# Patient Record
Sex: MALE | Race: WHITE | Employment: FULL TIME | URBAN - METROPOLITAN AREA
[De-identification: names, ages, dates, MRNs, and addresses within clinical notes are randomized per-mention and may not be internally consistent; named-entity substitution may affect disease eponyms.]

---

## 2017-01-19 ENCOUNTER — OFFICE VISIT (OUTPATIENT)
Dept: PAIN MANAGEMENT | Age: 52
End: 2017-01-19

## 2017-01-19 VITALS
SYSTOLIC BLOOD PRESSURE: 110 MMHG | HEART RATE: 76 BPM | BODY MASS INDEX: 26.54 KG/M2 | WEIGHT: 185 LBS | DIASTOLIC BLOOD PRESSURE: 64 MMHG

## 2017-01-19 DIAGNOSIS — M51.27 PROLAPSED LUMBOSACRAL INTERVERTEBRAL DISC: ICD-10-CM

## 2017-01-19 DIAGNOSIS — S33.5XXA LOW BACK SPRAIN, INITIAL ENCOUNTER: ICD-10-CM

## 2017-01-19 DIAGNOSIS — S33.5XXA SPRAIN OF LUMBAR REGION, INITIAL ENCOUNTER: ICD-10-CM

## 2017-01-19 PROCEDURE — 99214 OFFICE O/P EST MOD 30 MIN: CPT | Performed by: INTERNAL MEDICINE

## 2017-01-19 RX ORDER — AMITRIPTYLINE HYDROCHLORIDE 25 MG/1
25 TABLET, FILM COATED ORAL NIGHTLY PRN
Qty: 30 TABLET | Refills: 1 | Status: SHIPPED | OUTPATIENT
Start: 2017-01-19 | End: 2017-02-17 | Stop reason: SDUPTHER

## 2017-01-19 RX ORDER — MORPHINE SULFATE 30 MG/1
30 CAPSULE, EXTENDED RELEASE ORAL DAILY
Qty: 30 CAPSULE | Refills: 0 | Status: SHIPPED | OUTPATIENT
Start: 2017-01-19 | End: 2017-02-17 | Stop reason: SDUPTHER

## 2017-01-19 RX ORDER — MAGNESIUM OXIDE 400 MG/1
TABLET ORAL
Qty: 60 TABLET | Refills: 1 | Status: SHIPPED | OUTPATIENT
Start: 2017-01-19 | End: 2017-03-20 | Stop reason: SDUPTHER

## 2017-01-19 RX ORDER — HYDROCODONE BITARTRATE AND ACETAMINOPHEN 7.5; 325 MG/1; MG/1
1 TABLET ORAL EVERY 6 HOURS PRN
Qty: 90 TABLET | Refills: 0 | Status: SHIPPED | OUTPATIENT
Start: 2017-01-19 | End: 2017-02-17 | Stop reason: SDUPTHER

## 2017-01-19 RX ORDER — MELOXICAM 15 MG/1
15 TABLET ORAL DAILY PRN
Qty: 30 TABLET | Refills: 1 | Status: SHIPPED | OUTPATIENT
Start: 2017-01-19 | End: 2017-02-17 | Stop reason: SDUPTHER

## 2017-02-17 ENCOUNTER — OFFICE VISIT (OUTPATIENT)
Dept: PAIN MANAGEMENT | Age: 52
End: 2017-02-17

## 2017-02-17 VITALS
WEIGHT: 181 LBS | DIASTOLIC BLOOD PRESSURE: 74 MMHG | HEART RATE: 72 BPM | SYSTOLIC BLOOD PRESSURE: 126 MMHG | BODY MASS INDEX: 25.97 KG/M2

## 2017-02-17 DIAGNOSIS — M51.27 PROLAPSED LUMBOSACRAL INTERVERTEBRAL DISC: ICD-10-CM

## 2017-02-17 DIAGNOSIS — S33.5XXA LOW BACK SPRAIN, INITIAL ENCOUNTER: ICD-10-CM

## 2017-02-17 DIAGNOSIS — S33.5XXA SPRAIN OF LUMBAR REGION, INITIAL ENCOUNTER: ICD-10-CM

## 2017-02-17 PROCEDURE — 99214 OFFICE O/P EST MOD 30 MIN: CPT | Performed by: INTERNAL MEDICINE

## 2017-02-17 RX ORDER — MELOXICAM 15 MG/1
15 TABLET ORAL DAILY PRN
Qty: 30 TABLET | Refills: 1 | Status: SHIPPED | OUTPATIENT
Start: 2017-02-17 | End: 2017-03-20 | Stop reason: SDUPTHER

## 2017-02-17 RX ORDER — AMITRIPTYLINE HYDROCHLORIDE 25 MG/1
25 TABLET, FILM COATED ORAL NIGHTLY PRN
Qty: 30 TABLET | Refills: 1 | Status: SHIPPED | OUTPATIENT
Start: 2017-02-17 | End: 2017-03-20 | Stop reason: SDUPTHER

## 2017-02-17 RX ORDER — HYDROCODONE BITARTRATE AND ACETAMINOPHEN 7.5; 325 MG/1; MG/1
1 TABLET ORAL EVERY 6 HOURS PRN
Qty: 90 TABLET | Refills: 0 | Status: SHIPPED | OUTPATIENT
Start: 2017-02-17 | End: 2017-03-20 | Stop reason: SDUPTHER

## 2017-02-17 RX ORDER — MORPHINE SULFATE 30 MG/1
30 CAPSULE, EXTENDED RELEASE ORAL DAILY
Qty: 30 CAPSULE | Refills: 0 | Status: SHIPPED | OUTPATIENT
Start: 2017-02-17 | End: 2017-03-20 | Stop reason: SDUPTHER

## 2017-03-20 ENCOUNTER — OFFICE VISIT (OUTPATIENT)
Dept: PAIN MANAGEMENT | Age: 52
End: 2017-03-20

## 2017-03-20 VITALS
WEIGHT: 180 LBS | BODY MASS INDEX: 25.83 KG/M2 | SYSTOLIC BLOOD PRESSURE: 123 MMHG | HEART RATE: 73 BPM | DIASTOLIC BLOOD PRESSURE: 68 MMHG

## 2017-03-20 DIAGNOSIS — S33.5XXA SPRAIN OF LUMBAR REGION, INITIAL ENCOUNTER: ICD-10-CM

## 2017-03-20 DIAGNOSIS — S33.5XXA LOW BACK SPRAIN, INITIAL ENCOUNTER: ICD-10-CM

## 2017-03-20 DIAGNOSIS — M51.27 PROLAPSED LUMBOSACRAL INTERVERTEBRAL DISC: ICD-10-CM

## 2017-03-20 PROCEDURE — 99214 OFFICE O/P EST MOD 30 MIN: CPT | Performed by: INTERNAL MEDICINE

## 2017-03-20 RX ORDER — AMITRIPTYLINE HYDROCHLORIDE 25 MG/1
25 TABLET, FILM COATED ORAL NIGHTLY PRN
Qty: 30 TABLET | Refills: 0 | Status: SHIPPED | OUTPATIENT
Start: 2017-03-20 | End: 2017-04-18 | Stop reason: SDUPTHER

## 2017-03-20 RX ORDER — MELOXICAM 15 MG/1
15 TABLET ORAL DAILY PRN
Qty: 30 TABLET | Refills: 0 | Status: SHIPPED | OUTPATIENT
Start: 2017-03-20 | End: 2017-04-18 | Stop reason: SDUPTHER

## 2017-03-20 RX ORDER — MORPHINE SULFATE 30 MG/1
30 CAPSULE, EXTENDED RELEASE ORAL DAILY
Qty: 28 CAPSULE | Refills: 0 | Status: SHIPPED | OUTPATIENT
Start: 2017-03-20 | End: 2017-04-18 | Stop reason: SDUPTHER

## 2017-03-20 RX ORDER — HYDROCODONE BITARTRATE AND ACETAMINOPHEN 7.5; 325 MG/1; MG/1
1 TABLET ORAL EVERY 6 HOURS PRN
Qty: 84 TABLET | Refills: 0 | Status: SHIPPED | OUTPATIENT
Start: 2017-03-20 | End: 2017-04-18 | Stop reason: SDUPTHER

## 2017-03-20 RX ORDER — MAGNESIUM OXIDE 400 MG/1
TABLET ORAL
Qty: 60 TABLET | Refills: 0 | Status: SHIPPED | OUTPATIENT
Start: 2017-03-20 | End: 2017-04-18 | Stop reason: SDUPTHER

## 2017-04-18 ENCOUNTER — OFFICE VISIT (OUTPATIENT)
Dept: PAIN MANAGEMENT | Age: 52
End: 2017-04-18

## 2017-04-18 VITALS
WEIGHT: 191 LBS | HEART RATE: 63 BPM | SYSTOLIC BLOOD PRESSURE: 137 MMHG | BODY MASS INDEX: 27.41 KG/M2 | DIASTOLIC BLOOD PRESSURE: 73 MMHG

## 2017-04-18 DIAGNOSIS — S33.5XXA SPRAIN OF LUMBAR REGION, INITIAL ENCOUNTER: ICD-10-CM

## 2017-04-18 DIAGNOSIS — M51.27 PROLAPSED LUMBOSACRAL INTERVERTEBRAL DISC: ICD-10-CM

## 2017-04-18 DIAGNOSIS — S33.5XXA LOW BACK SPRAIN, INITIAL ENCOUNTER: ICD-10-CM

## 2017-04-18 PROCEDURE — 99213 OFFICE O/P EST LOW 20 MIN: CPT | Performed by: INTERNAL MEDICINE

## 2017-04-18 RX ORDER — MAGNESIUM OXIDE 400 MG/1
TABLET ORAL
Qty: 60 TABLET | Refills: 0 | Status: SHIPPED | OUTPATIENT
Start: 2017-04-18 | End: 2017-05-16 | Stop reason: SDUPTHER

## 2017-04-18 RX ORDER — HYDROCODONE BITARTRATE AND ACETAMINOPHEN 7.5; 325 MG/1; MG/1
1 TABLET ORAL EVERY 6 HOURS PRN
Qty: 84 TABLET | Refills: 0 | Status: SHIPPED | OUTPATIENT
Start: 2017-04-18 | End: 2017-05-16 | Stop reason: SDUPTHER

## 2017-04-18 RX ORDER — MELOXICAM 15 MG/1
15 TABLET ORAL DAILY PRN
Qty: 30 TABLET | Refills: 0 | Status: SHIPPED | OUTPATIENT
Start: 2017-04-18 | End: 2017-05-16 | Stop reason: SDUPTHER

## 2017-04-18 RX ORDER — MORPHINE SULFATE 30 MG/1
30 CAPSULE, EXTENDED RELEASE ORAL DAILY
Qty: 28 CAPSULE | Refills: 0 | Status: SHIPPED | OUTPATIENT
Start: 2017-04-18 | End: 2017-05-16 | Stop reason: SDUPTHER

## 2017-04-18 RX ORDER — AMITRIPTYLINE HYDROCHLORIDE 25 MG/1
25 TABLET, FILM COATED ORAL NIGHTLY PRN
Qty: 30 TABLET | Refills: 0 | Status: SHIPPED | OUTPATIENT
Start: 2017-04-18 | End: 2017-05-16 | Stop reason: SDUPTHER

## 2017-05-16 ENCOUNTER — OFFICE VISIT (OUTPATIENT)
Dept: PAIN MANAGEMENT | Age: 52
End: 2017-05-16

## 2017-05-16 VITALS
SYSTOLIC BLOOD PRESSURE: 129 MMHG | HEART RATE: 65 BPM | WEIGHT: 183 LBS | BODY MASS INDEX: 26.26 KG/M2 | DIASTOLIC BLOOD PRESSURE: 73 MMHG

## 2017-05-16 DIAGNOSIS — S33.5XXA LOW BACK SPRAIN, INITIAL ENCOUNTER: ICD-10-CM

## 2017-05-16 DIAGNOSIS — M51.27 PROLAPSED LUMBOSACRAL INTERVERTEBRAL DISC: ICD-10-CM

## 2017-05-16 DIAGNOSIS — S33.5XXA SPRAIN OF LUMBAR REGION, INITIAL ENCOUNTER: ICD-10-CM

## 2017-05-16 PROCEDURE — 99213 OFFICE O/P EST LOW 20 MIN: CPT | Performed by: INTERNAL MEDICINE

## 2017-05-16 RX ORDER — AMITRIPTYLINE HYDROCHLORIDE 25 MG/1
25 TABLET, FILM COATED ORAL NIGHTLY PRN
Qty: 30 TABLET | Refills: 0 | Status: SHIPPED | OUTPATIENT
Start: 2017-05-16 | End: 2017-06-19 | Stop reason: SDUPTHER

## 2017-05-16 RX ORDER — MAGNESIUM OXIDE 400 MG/1
TABLET ORAL
Qty: 60 TABLET | Refills: 0 | Status: SHIPPED | OUTPATIENT
Start: 2017-05-16 | End: 2017-06-19 | Stop reason: SDUPTHER

## 2017-05-16 RX ORDER — HYDROCODONE BITARTRATE AND ACETAMINOPHEN 7.5; 325 MG/1; MG/1
1 TABLET ORAL EVERY 6 HOURS PRN
Qty: 90 TABLET | Refills: 0 | Status: SHIPPED | OUTPATIENT
Start: 2017-05-16 | End: 2017-06-19 | Stop reason: SDUPTHER

## 2017-05-16 RX ORDER — MORPHINE SULFATE 30 MG/1
30 CAPSULE, EXTENDED RELEASE ORAL DAILY
Qty: 30 CAPSULE | Refills: 0 | Status: SHIPPED | OUTPATIENT
Start: 2017-05-16 | End: 2017-06-15

## 2017-05-16 RX ORDER — MELOXICAM 15 MG/1
15 TABLET ORAL DAILY PRN
Qty: 30 TABLET | Refills: 0 | Status: SHIPPED | OUTPATIENT
Start: 2017-05-16 | End: 2017-06-19 | Stop reason: SDUPTHER

## 2017-06-19 ENCOUNTER — OFFICE VISIT (OUTPATIENT)
Dept: PAIN MANAGEMENT | Age: 52
End: 2017-06-19

## 2017-06-19 VITALS
DIASTOLIC BLOOD PRESSURE: 73 MMHG | SYSTOLIC BLOOD PRESSURE: 122 MMHG | WEIGHT: 181 LBS | BODY MASS INDEX: 25.97 KG/M2 | HEART RATE: 64 BPM

## 2017-06-19 DIAGNOSIS — M51.27 PROLAPSED LUMBOSACRAL INTERVERTEBRAL DISC: ICD-10-CM

## 2017-06-19 DIAGNOSIS — S33.5XXA LOW BACK SPRAIN, INITIAL ENCOUNTER: ICD-10-CM

## 2017-06-19 DIAGNOSIS — S33.5XXA SPRAIN OF LUMBAR REGION, INITIAL ENCOUNTER: ICD-10-CM

## 2017-06-19 PROCEDURE — 99213 OFFICE O/P EST LOW 20 MIN: CPT | Performed by: INTERNAL MEDICINE

## 2017-06-19 RX ORDER — MELOXICAM 15 MG/1
15 TABLET ORAL DAILY PRN
Qty: 30 TABLET | Refills: 0 | Status: SHIPPED | OUTPATIENT
Start: 2017-06-19 | End: 2017-07-20 | Stop reason: SDUPTHER

## 2017-06-19 RX ORDER — HYDROCODONE BITARTRATE AND ACETAMINOPHEN 7.5; 325 MG/1; MG/1
1 TABLET ORAL EVERY 6 HOURS PRN
Qty: 90 TABLET | Refills: 0 | Status: SHIPPED | OUTPATIENT
Start: 2017-06-19 | End: 2017-07-20 | Stop reason: SDUPTHER

## 2017-06-19 RX ORDER — MAGNESIUM OXIDE 400 MG/1
TABLET ORAL
Qty: 60 TABLET | Refills: 0 | Status: SHIPPED | OUTPATIENT
Start: 2017-06-19 | End: 2017-07-20 | Stop reason: SDUPTHER

## 2017-06-19 RX ORDER — MORPHINE SULFATE 30 MG/1
30 CAPSULE, EXTENDED RELEASE ORAL DAILY
Qty: 30 CAPSULE | Refills: 0 | Status: SHIPPED | OUTPATIENT
Start: 2017-06-19 | End: 2017-07-19

## 2017-06-19 RX ORDER — AMITRIPTYLINE HYDROCHLORIDE 25 MG/1
25 TABLET, FILM COATED ORAL NIGHTLY PRN
Qty: 30 TABLET | Refills: 0 | Status: SHIPPED | OUTPATIENT
Start: 2017-06-19 | End: 2017-07-20 | Stop reason: SDUPTHER

## 2017-07-20 ENCOUNTER — OFFICE VISIT (OUTPATIENT)
Dept: PAIN MANAGEMENT | Age: 52
End: 2017-07-20

## 2017-07-20 VITALS
HEART RATE: 67 BPM | DIASTOLIC BLOOD PRESSURE: 75 MMHG | SYSTOLIC BLOOD PRESSURE: 130 MMHG | BODY MASS INDEX: 25.83 KG/M2 | WEIGHT: 180 LBS

## 2017-07-20 DIAGNOSIS — S33.5XXA SPRAIN OF LUMBAR REGION, INITIAL ENCOUNTER: ICD-10-CM

## 2017-07-20 DIAGNOSIS — S33.5XXA LOW BACK SPRAIN, INITIAL ENCOUNTER: ICD-10-CM

## 2017-07-20 DIAGNOSIS — M51.27 PROLAPSED LUMBOSACRAL INTERVERTEBRAL DISC: ICD-10-CM

## 2017-07-20 PROCEDURE — 99214 OFFICE O/P EST MOD 30 MIN: CPT | Performed by: INTERNAL MEDICINE

## 2017-07-20 RX ORDER — MORPHINE SULFATE 30 MG/1
30 CAPSULE, EXTENDED RELEASE ORAL DAILY
Qty: 30 CAPSULE | Refills: 0 | Status: SHIPPED | OUTPATIENT
Start: 2017-07-20 | End: 2017-08-19

## 2017-07-20 RX ORDER — AMITRIPTYLINE HYDROCHLORIDE 25 MG/1
25 TABLET, FILM COATED ORAL NIGHTLY PRN
Qty: 30 TABLET | Refills: 0 | Status: SHIPPED | OUTPATIENT
Start: 2017-07-20 | End: 2017-08-12 | Stop reason: SDUPTHER

## 2017-07-20 RX ORDER — HYDROCODONE BITARTRATE AND ACETAMINOPHEN 7.5; 325 MG/1; MG/1
1 TABLET ORAL EVERY 6 HOURS PRN
Qty: 90 TABLET | Refills: 0 | Status: SHIPPED | OUTPATIENT
Start: 2017-07-20 | End: 2017-08-12 | Stop reason: SDUPTHER

## 2017-07-20 RX ORDER — MELOXICAM 15 MG/1
15 TABLET ORAL DAILY PRN
Qty: 30 TABLET | Refills: 0 | Status: SHIPPED | OUTPATIENT
Start: 2017-07-20 | End: 2017-08-12 | Stop reason: SDUPTHER

## 2017-07-20 RX ORDER — METHYLPREDNISOLONE 4 MG/1
TABLET ORAL
Qty: 1 KIT | Refills: 0 | Status: SHIPPED | OUTPATIENT
Start: 2017-07-20 | End: 2017-08-12

## 2017-07-20 RX ORDER — MAGNESIUM OXIDE 400 MG/1
TABLET ORAL
Qty: 60 TABLET | Refills: 0 | Status: SHIPPED | OUTPATIENT
Start: 2017-07-20 | End: 2017-08-12 | Stop reason: SDUPTHER

## 2017-08-12 ENCOUNTER — OFFICE VISIT (OUTPATIENT)
Dept: PAIN MANAGEMENT | Age: 52
End: 2017-08-12

## 2017-08-12 VITALS
SYSTOLIC BLOOD PRESSURE: 138 MMHG | DIASTOLIC BLOOD PRESSURE: 77 MMHG | HEART RATE: 66 BPM | WEIGHT: 180 LBS | BODY MASS INDEX: 25.83 KG/M2

## 2017-08-12 DIAGNOSIS — M51.27 PROLAPSED LUMBOSACRAL INTERVERTEBRAL DISC: ICD-10-CM

## 2017-08-12 DIAGNOSIS — S33.5XXA LOW BACK SPRAIN, INITIAL ENCOUNTER: ICD-10-CM

## 2017-08-12 DIAGNOSIS — S33.5XXA SPRAIN OF LUMBAR REGION, INITIAL ENCOUNTER: ICD-10-CM

## 2017-08-12 PROCEDURE — 20553 NJX 1/MLT TRIGGER POINTS 3/>: CPT | Performed by: INTERNAL MEDICINE

## 2017-08-12 PROCEDURE — 99214 OFFICE O/P EST MOD 30 MIN: CPT | Performed by: INTERNAL MEDICINE

## 2017-08-12 RX ORDER — TRIAMCINOLONE ACETONIDE 40 MG/ML
40 INJECTION, SUSPENSION INTRA-ARTICULAR; INTRAMUSCULAR ONCE
Status: COMPLETED | OUTPATIENT
Start: 2017-08-12 | End: 2017-08-12

## 2017-08-12 RX ORDER — MELOXICAM 15 MG/1
15 TABLET ORAL DAILY PRN
Qty: 30 TABLET | Refills: 1 | Status: SHIPPED | OUTPATIENT
Start: 2017-08-12 | End: 2017-09-19 | Stop reason: SDUPTHER

## 2017-08-12 RX ORDER — HYDROCODONE BITARTRATE AND ACETAMINOPHEN 7.5; 325 MG/1; MG/1
1 TABLET ORAL EVERY 6 HOURS PRN
Qty: 90 TABLET | Refills: 0 | Status: SHIPPED | OUTPATIENT
Start: 2017-08-12 | End: 2017-09-19 | Stop reason: SDUPTHER

## 2017-08-12 RX ORDER — MAGNESIUM OXIDE 400 MG/1
TABLET ORAL
Qty: 60 TABLET | Refills: 1 | Status: SHIPPED | OUTPATIENT
Start: 2017-08-12 | End: 2017-09-19 | Stop reason: SDUPTHER

## 2017-08-12 RX ORDER — MORPHINE SULFATE 30 MG/1
30 CAPSULE, EXTENDED RELEASE ORAL DAILY
Qty: 30 CAPSULE | Refills: 0 | Status: SHIPPED | OUTPATIENT
Start: 2017-08-12 | End: 2017-09-11

## 2017-08-12 RX ORDER — DULOXETIN HYDROCHLORIDE 30 MG/1
30 CAPSULE, DELAYED RELEASE ORAL DAILY
Qty: 30 CAPSULE | Refills: 0 | Status: SHIPPED | OUTPATIENT
Start: 2017-08-12 | End: 2017-09-19 | Stop reason: SDUPTHER

## 2017-08-12 RX ORDER — AMITRIPTYLINE HYDROCHLORIDE 25 MG/1
25 TABLET, FILM COATED ORAL NIGHTLY PRN
Qty: 30 TABLET | Refills: 1 | Status: SHIPPED | OUTPATIENT
Start: 2017-08-12 | End: 2017-09-19 | Stop reason: SDUPTHER

## 2017-08-12 RX ADMIN — TRIAMCINOLONE ACETONIDE 40 MG: 40 INJECTION, SUSPENSION INTRA-ARTICULAR; INTRAMUSCULAR at 12:20

## 2017-09-06 ENCOUNTER — TELEPHONE (OUTPATIENT)
Dept: PAIN MANAGEMENT | Age: 52
End: 2017-09-06

## 2017-09-19 ENCOUNTER — OFFICE VISIT (OUTPATIENT)
Dept: PAIN MANAGEMENT | Age: 52
End: 2017-09-19

## 2017-09-19 VITALS
DIASTOLIC BLOOD PRESSURE: 72 MMHG | WEIGHT: 180 LBS | HEART RATE: 72 BPM | SYSTOLIC BLOOD PRESSURE: 116 MMHG | BODY MASS INDEX: 25.83 KG/M2

## 2017-09-19 DIAGNOSIS — M51.27 PROLAPSED LUMBOSACRAL INTERVERTEBRAL DISC: ICD-10-CM

## 2017-09-19 DIAGNOSIS — S33.5XXA LOW BACK SPRAIN, INITIAL ENCOUNTER: ICD-10-CM

## 2017-09-19 DIAGNOSIS — S33.5XXA SPRAIN OF LUMBAR REGION, INITIAL ENCOUNTER: ICD-10-CM

## 2017-09-19 PROCEDURE — 99213 OFFICE O/P EST LOW 20 MIN: CPT | Performed by: INTERNAL MEDICINE

## 2017-09-19 RX ORDER — DULOXETIN HYDROCHLORIDE 30 MG/1
30 CAPSULE, DELAYED RELEASE ORAL DAILY
Qty: 30 CAPSULE | Refills: 0 | Status: SHIPPED | OUTPATIENT
Start: 2017-09-19 | End: 2017-11-14

## 2017-09-19 RX ORDER — HYDROCODONE BITARTRATE AND ACETAMINOPHEN 7.5; 325 MG/1; MG/1
1 TABLET ORAL EVERY 6 HOURS PRN
Qty: 84 TABLET | Refills: 0 | Status: SHIPPED | OUTPATIENT
Start: 2017-09-19 | End: 2017-10-17 | Stop reason: SDUPTHER

## 2017-09-19 RX ORDER — MORPHINE SULFATE 30 MG/1
30 CAPSULE, EXTENDED RELEASE ORAL DAILY
Qty: 28 CAPSULE | Refills: 0 | Status: SHIPPED | OUTPATIENT
Start: 2017-09-19 | End: 2017-10-17 | Stop reason: SDUPTHER

## 2017-09-19 RX ORDER — AMITRIPTYLINE HYDROCHLORIDE 25 MG/1
25 TABLET, FILM COATED ORAL NIGHTLY PRN
Qty: 30 TABLET | Refills: 0 | Status: SHIPPED | OUTPATIENT
Start: 2017-09-19 | End: 2018-02-06 | Stop reason: SDUPTHER

## 2017-09-19 RX ORDER — MAGNESIUM OXIDE 400 MG/1
TABLET ORAL
Qty: 60 TABLET | Refills: 0 | Status: SHIPPED | OUTPATIENT
Start: 2017-09-19 | End: 2018-01-09 | Stop reason: SDUPTHER

## 2017-09-19 RX ORDER — MELOXICAM 15 MG/1
15 TABLET ORAL DAILY PRN
Qty: 30 TABLET | Refills: 0 | Status: SHIPPED | OUTPATIENT
Start: 2017-09-19 | End: 2017-12-12 | Stop reason: SDUPTHER

## 2017-09-21 ENCOUNTER — TELEPHONE (OUTPATIENT)
Dept: PAIN MANAGEMENT | Age: 52
End: 2017-09-21

## 2017-10-17 ENCOUNTER — OFFICE VISIT (OUTPATIENT)
Dept: PAIN MANAGEMENT | Age: 52
End: 2017-10-17

## 2017-10-17 VITALS
BODY MASS INDEX: 26.4 KG/M2 | SYSTOLIC BLOOD PRESSURE: 124 MMHG | HEART RATE: 61 BPM | WEIGHT: 184 LBS | DIASTOLIC BLOOD PRESSURE: 67 MMHG

## 2017-10-17 DIAGNOSIS — S33.5XXA LOW BACK SPRAIN, INITIAL ENCOUNTER: ICD-10-CM

## 2017-10-17 DIAGNOSIS — M51.27 PROLAPSED LUMBOSACRAL INTERVERTEBRAL DISC: ICD-10-CM

## 2017-10-17 DIAGNOSIS — S33.5XXA SPRAIN OF LUMBAR REGION, INITIAL ENCOUNTER: ICD-10-CM

## 2017-10-17 PROCEDURE — 99214 OFFICE O/P EST MOD 30 MIN: CPT | Performed by: INTERNAL MEDICINE

## 2017-10-17 RX ORDER — HYDROCODONE BITARTRATE AND ACETAMINOPHEN 7.5; 325 MG/1; MG/1
1 TABLET ORAL EVERY 6 HOURS PRN
Qty: 84 TABLET | Refills: 0 | Status: SHIPPED | OUTPATIENT
Start: 2017-10-17 | End: 2017-11-14 | Stop reason: SDUPTHER

## 2017-10-17 RX ORDER — MORPHINE SULFATE 30 MG/1
30 CAPSULE, EXTENDED RELEASE ORAL DAILY
Qty: 28 CAPSULE | Refills: 0 | Status: SHIPPED | OUTPATIENT
Start: 2017-10-17 | End: 2017-11-14 | Stop reason: SDUPTHER

## 2017-10-17 NOTE — PROGRESS NOTES
release capsule Take 1 capsule by mouth daily 30 capsule 0    NOVOLOG 100 UNIT/ML injection continuous. Insulin pump averages 50-80 units daily      aspirin 325 MG tablet Take 325 mg by mouth daily.  carvedilol (COREG) 6.25 MG tablet Take 6.25 mg by mouth 2 times daily (with meals).  atorvastatin (LIPITOR) 80 MG tablet Take 80 mg by mouth nightly.  clopidogrel (PLAVIX) 75 MG tablet Take 75 mg by mouth daily.  lisinopril (PRINIVIL;ZESTRIL) 10 MG tablet Take 10 mg by mouth daily. No facility-administered medications prior to visit. REVIEW OF SYSTEMS:   Constitutional: Negative for fatigue and unexpected weight change. Eyes: Negative for visual disturbance. Respiratory: Negative for shortness of breath. Cardiovascular: Negative for chest pain, palpitations  Gastrointestinal: Negative for blood in stool, abdominal distention, nausea, vomiting, abdominal pain, diarrhea,constipation. Skin: Negative for color change or any abnormal bruising. Neurological: Negative for speech difficulty, weakness and light-headedness, dizziness, tremors, sleepiness  Psychiatric/Behavioral: Negative for suicidal ideas, hallucinations, behavioral problems, self-injury, decreased concentration/cognition, agitation, confusion. PHYSICAL EXAM:   Nursing note and vitals reviewed. /67 (Site: Right Arm, Position: Sitting)   Pulse 61   Wt 184 lb (83.5 kg)   BMI 26.40 kg/m²   General Appearance: Patient is well nourished, well developed, well groomed and in no acute distress. Skin: Skin is warm and dry, good turgor . No rash or lesions noted. He is not diaphoretic. Pulmonary/Chest: Effort normal. No respiratory distress or use of accessory muscles. Auscultation revealing normal air entry. He does not have wheezes in the lung fields. He has no rales. Cardiovascular: Normal rate, regular rhythm, normal heart sounds, and does not have murmur. Exam reveals no gallop and no friction rub. Prescriptions   Medication Sig Dispense Refill    HYDROcodone-acetaminophen (NORCO) 7.5-325 MG per tablet Take 1 tablet by mouth every 6 hours as needed for Pain  MAX 3 PER DAY. 84 tablet 0    morphine (LAURA) 30 MG extended release capsule Take 1 capsule by mouth daily . 28 capsule 0    amitriptyline (ELAVIL) 25 MG tablet Take 1 tablet by mouth nightly as needed for Sleep 30 tablet 0    meloxicam (MOBIC) 15 MG tablet Take 1 tablet by mouth daily as needed for Pain 30 tablet 0    magnesium oxide (MAG-OX) 400 MG tablet Take one tablet Po BID 60 tablet 0    DULoxetine (CYMBALTA) 30 MG extended release capsule Take 1 capsule by mouth daily 30 capsule 0    NOVOLOG 100 UNIT/ML injection continuous. Insulin pump averages 50-80 units daily      aspirin 325 MG tablet Take 325 mg by mouth daily.  carvedilol (COREG) 6.25 MG tablet Take 6.25 mg by mouth 2 times daily (with meals).  atorvastatin (LIPITOR) 80 MG tablet Take 80 mg by mouth nightly.  clopidogrel (PLAVIX) 75 MG tablet Take 75 mg by mouth daily.  lisinopril (PRINIVIL;ZESTRIL) 10 MG tablet Take 10 mg by mouth daily. No current facility-administered medications for this visit. Goals of current treatment regimen include improvement in pain, restoration of functioning- with focus on improvement in physical performance, general activity, work or disability,emotional distress, health care utilization and  decreased medication consumption. Will continue to monitor progress towards achieving/maintaining therapeutic goals with special emphasis on  1. Improvement in perceived interfernce  of pain with ADL's. Ability to do home exercises independently. Ability to do household chores indoor and/or outdoor work and social and leisure activities. To increase flexibility/ROM, strength and endurance. Improve psychosocial and physical functioning.- he is showing progression towards this treatment goal with the current regimen.    2. is both accurate and complete

## 2017-11-14 ENCOUNTER — OFFICE VISIT (OUTPATIENT)
Dept: PAIN MANAGEMENT | Age: 52
End: 2017-11-14

## 2017-11-14 VITALS
HEART RATE: 67 BPM | DIASTOLIC BLOOD PRESSURE: 80 MMHG | SYSTOLIC BLOOD PRESSURE: 120 MMHG | WEIGHT: 185 LBS | BODY MASS INDEX: 26.54 KG/M2

## 2017-11-14 DIAGNOSIS — S33.5XXA SPRAIN OF LOW BACK, INITIAL ENCOUNTER: ICD-10-CM

## 2017-11-14 DIAGNOSIS — S33.5XXA LOW BACK SPRAIN, INITIAL ENCOUNTER: ICD-10-CM

## 2017-11-14 DIAGNOSIS — M51.27 PROLAPSED LUMBOSACRAL INTERVERTEBRAL DISC: ICD-10-CM

## 2017-11-14 DIAGNOSIS — S33.5XXA SPRAIN OF LUMBAR REGION, INITIAL ENCOUNTER: ICD-10-CM

## 2017-11-14 DIAGNOSIS — S33.5XXA LUMBAR SPRAIN, INITIAL ENCOUNTER: ICD-10-CM

## 2017-11-14 PROCEDURE — 99214 OFFICE O/P EST MOD 30 MIN: CPT | Performed by: INTERNAL MEDICINE

## 2017-11-14 RX ORDER — MORPHINE SULFATE 30 MG/1
30 CAPSULE, EXTENDED RELEASE ORAL DAILY
Qty: 28 CAPSULE | Refills: 0 | Status: SHIPPED | OUTPATIENT
Start: 2017-11-14 | End: 2017-12-12 | Stop reason: SDUPTHER

## 2017-11-14 RX ORDER — HYDROCODONE BITARTRATE AND ACETAMINOPHEN 7.5; 325 MG/1; MG/1
1 TABLET ORAL EVERY 6 HOURS PRN
Qty: 84 TABLET | Refills: 0 | Status: SHIPPED | OUTPATIENT
Start: 2017-11-14 | End: 2017-12-12 | Stop reason: SDUPTHER

## 2017-11-14 RX ORDER — METHYLPREDNISOLONE 4 MG/1
TABLET ORAL
Qty: 1 KIT | Refills: 0 | Status: SHIPPED | OUTPATIENT
Start: 2017-11-14 | End: 2017-12-12

## 2017-11-14 RX ORDER — DULOXETIN HYDROCHLORIDE 30 MG/1
30 CAPSULE, DELAYED RELEASE ORAL DAILY
Qty: 30 CAPSULE | Refills: 0 | Status: SHIPPED | OUTPATIENT
Start: 2017-11-14 | End: 2017-12-12 | Stop reason: SDUPTHER

## 2017-11-14 NOTE — PROGRESS NOTES
Tonya RUST  1965  [id number]    HISTORY OF PRESENT ILLNESS:  Mr. Esau Sosa is a 45 y.o. male returns for a follow up visit for multiple medical problems. His current presenting problems are   1. Lumbar sprain, initial encounter    2. Sprain of low back, initial encounter    3. bwc-Prolapsed lumbosacral intervertebral disc    4. BWC Sprain of lumbar region, initial encounter    5. BWC Low back sprain, initial encounter    . As per information/history obtained from the PADT(patient assessment and documentation tool) - He complains of pain in the lower back with radiation to the buttocks, hips Bilateral, upper leg Bilateral and knees Bilateral He rates the pain 7/10 and describes it as aching, burning, throbbing, shooting. Pain is made worse by: movement, walking, standing, bending. Current treatment regimen has helped relieve about 60% of the pain. He denies side effects from the current pain regimen. Patient reports that since the last follow up visit the physical functioning is worse, family/social relationships are unchanged, mood is unchanged and sleep patterns are unchanged, and that the overall functioning is worse. Patient denies neurological bowel or bladder. Patient denies misusing/abusing his narcotic pain medications or using any illegal drugs. There are No indicators for possible drug abuse, addiction or diversion problems. Upon obtaining the medical history from Mr. Esau Sosa regarding today's office visit for his presenting problems, patient states pain has been somewhat worse. He complains of increased pain with changes in weather. Extreme temperatures- cold and damp weather causes increased pain. Mr. Esau Sosa says he is having increase left leg pain all the time. He mentions pain is more in the hip and thigh, using Mobic 1 per day. He states working full time, no heavy lifting, bending mostly. Patient's  subjective report of his mood is fair.  he describes occasional symptoms of depression, occasional irritability and some mood swings. Describes his mood as being neutral and reports some pleasure in his daily activities. Reports  fair  appetite, energy and concentration. Able to function well in different aspects of his daily activities. Denies suicidal or homicidal ideation. Denies any complaints of increased tension, does   Worry sometimes and occasional  irritability  he denies any c/o increased anxiety, No c/o panic attacks or symptoms of PTSD, was using Cymbalta PRN. Patient states his sleep is fair. Has fairly normal sleep latency. Averages about 4-6 hours of sleep a night. Denies any signs of sleep apnea. Feels somewhat rested in the morning. ALLERGIES/PAST MED/FAM/SOC HISTORY: Mr. Jacob Low allergies, past medical, family and social history were reviewed in the chart and also listed below. Social History     Social History    Marital status:      Spouse name: N/A    Number of children: N/A    Years of education: N/A     Occupational History    Not on file. Social History Main Topics    Smoking status: Former Smoker     Packs/day: 0.50     Years: 15.00     Types: Cigarettes    Smokeless tobacco: Former User    Alcohol use 1.2 oz/week     2 Cans of beer per week      Comment: occa    Drug use: No    Sexual activity: Not on file     Other Topics Concern    Not on file     Social History Narrative    No narrative on file       Mr. Jacob Low current medications are   Outpatient Medications Prior to Visit   Medication Sig Dispense Refill    HYDROcodone-acetaminophen (NORCO) 7.5-325 MG per tablet Take 1 tablet by mouth every 6 hours as needed for Pain  MAX 3 PER DAY. 84 tablet 0    morphine (LAURA) 30 MG extended release capsule Take 1 capsule by mouth daily .  28 capsule 0    amitriptyline (ELAVIL) 25 MG tablet Take 1 tablet by mouth nightly as needed for Sleep 30 tablet 0    meloxicam (MOBIC) 15 MG tablet Take 1 tablet by mouth daily as needed for Pain 30 tablet 0    magnesium oxide (MAG-OX) 400 MG tablet Take one tablet Po BID 60 tablet 0    NOVOLOG 100 UNIT/ML injection continuous. Insulin pump averages 50-80 units daily      aspirin 325 MG tablet Take 325 mg by mouth daily.  carvedilol (COREG) 6.25 MG tablet Take 6.25 mg by mouth 2 times daily (with meals).  atorvastatin (LIPITOR) 80 MG tablet Take 80 mg by mouth nightly.  clopidogrel (PLAVIX) 75 MG tablet Take 75 mg by mouth daily.  lisinopril (PRINIVIL;ZESTRIL) 10 MG tablet Take 10 mg by mouth daily.  DULoxetine (CYMBALTA) 30 MG extended release capsule Take 1 capsule by mouth daily 30 capsule 0     No facility-administered medications prior to visit. REVIEW OF SYSTEMS:   Constitutional: Negative for fatigue and unexpected weight change. Eyes: Negative for visual disturbance. Respiratory: Negative for shortness of breath. Cardiovascular: Negative for chest pain, palpitations  Gastrointestinal: Negative for blood in stool, abdominal distention, nausea, vomiting, abdominal pain, diarrhea,constipation. Skin: Negative for color change or any abnormal bruising. Neurological: Negative for speech difficulty, weakness and light-headedness, dizziness, tremors, sleepiness  Psychiatric/Behavioral: Negative for suicidal ideas, hallucinations, behavioral problems, self-injury, decreased concentration/cognition, agitation, confusion. PHYSICAL EXAM:   Nursing note and vitals reviewed. /80   Pulse 67   Wt 185 lb (83.9 kg)   BMI 26.54 kg/m²   General Appearance: Patient is well nourished, well developed, well groomed and in no acute distress. Skin: Skin is warm and dry, good turgor . No rash or lesions noted. He is not diaphoretic. Pulmonary/Chest: Effort normal. No respiratory distress or use of accessory muscles. Auscultation revealing normal air entry. He does not have wheezes in the lung fields. He has no rales.    Cardiovascular: Normal rate, regular rhythm, normal heart sounds, and does not have murmur. Exam reveals no gallop and no friction rub. Abdominal: Soft. Bowel sounds are normal.  On inspection of abdomen is flat no distension and no mass. No tenderness. He has no rebound and no guarding. Musculoskeletal / Extremities: Range of motion is normal. Gait is normal, assistive devices use: none. He exhibits edema: none, and no tenderness. Neurological/Psychiatric:He is alert and oriented to person, place, and time. Coordination is  normal.   Judgement and Insight is normal  His mood is Appropriate and affect is Flat/blunted . His behavior is normal.   thought content normal.        IMPRESSION:     1. Lumbar sprain, initial encounter    2. Sprain of low back, initial encounter    3. bwc-Prolapsed lumbosacral intervertebral disc    4. BWC Sprain of lumbar region, initial encounter    5. BWC Low back sprain, initial encounter        PLAN:  Informed verbal consent was obtained. -Adv Biofeedback, relaxation and meditation techniques.  Referral to psychologist for CBT was also discussed with patient  -Restart Cymbalta 30 mg daily  -Luís exercises given  -Start Medrol Pack to help with radicular pain  -he was advised proper sleep hygiene-told to avoid:use of caffeine or other stimulants after noon, alcohol use near bedtime, long or frequent naps during the day, erratic sleep schedule, heavy meals near bedtime, vigorous exercise near bedtime and use of electronic devices near bedtime  -He was advised to increase fluids ( 5-7  glasses of fluid daily), limit caffeine, avoid cheese products, increase dietary fiber, increase activity and exercise as tolerated and relax regularly and enjoy meals  -Continue with Mobic 15 mg daily  -Advised caffeine reduction, dietary changes, elevate head end of bed, NPO after supper, if using alcohol advised reduction of alcohol intake, tobacco cessation if smoking, weight loss  -[place] was obtained and reviewed  for the last one year and no indicators of drug misuse  were found. Any other controlled substance prescriptions  seen on the record have been accounted for, I am aware of the patient receiving these medications. [place] record will be rechecked as part of office protocol. Mr. Delia Hickman will be prescribed  the medications  listed below which are for treatment of his presenting  medical problems which for this visit include:   Diagnoses of Lumbar sprain, initial encounter, Sprain of low back, initial encounter, bwc-Prolapsed lumbosacral intervertebral disc, BWC Sprain of lumbar region, initial encounter, and BWC Low back sprain, initial encounter were pertinent to this visit. Medications/orders associated with this visit:    Current Outpatient Prescriptions   Medication Sig Dispense Refill    HYDROcodone-acetaminophen (NORCO) 7.5-325 MG per tablet Take 1 tablet by mouth every 6 hours as needed for Pain  MAX 3 PER DAY. 84 tablet 0    morphine (LAURA) 30 MG extended release capsule Take 1 capsule by mouth daily . 28 capsule 0    amitriptyline (ELAVIL) 25 MG tablet Take 1 tablet by mouth nightly as needed for Sleep 30 tablet 0    meloxicam (MOBIC) 15 MG tablet Take 1 tablet by mouth daily as needed for Pain 30 tablet 0    magnesium oxide (MAG-OX) 400 MG tablet Take one tablet Po BID 60 tablet 0    NOVOLOG 100 UNIT/ML injection continuous. Insulin pump averages 50-80 units daily      aspirin 325 MG tablet Take 325 mg by mouth daily.  carvedilol (COREG) 6.25 MG tablet Take 6.25 mg by mouth 2 times daily (with meals).  atorvastatin (LIPITOR) 80 MG tablet Take 80 mg by mouth nightly.  clopidogrel (PLAVIX) 75 MG tablet Take 75 mg by mouth daily.  lisinopril (PRINIVIL;ZESTRIL) 10 MG tablet Take 10 mg by mouth daily. No current facility-administered medications for this visit.          Goals of current treatment regimen include improvement in pain, restoration of functioning- with focus on improvement in physical performance, general activity, work or disability,emotional distress, health care utilization and  decreased medication consumption. Will continue to monitor progress towards achieving/maintaining therapeutic goals with special emphasis on  1. Improvement in perceived interfernce  of pain with ADL's. Ability to do home exercises independently. Ability to do household chores indoor and/or outdoor work and social and leisure activities. To increase flexibility/ROM, strength and endurance. Improve psychosocial and physical functioning.- he is not showing any significant progress/or showing regression  towards this goal and reassessment and adjustment of goals/treatment have been made. 2. Improving sleep to 6-7 hours a night. Improve mood/ anxiety and depression symptoms such as crying spells, low energy, problems with concentration, motivation.- he is not showing any significant progress/or showing regression  towards this goal and reassessment and adjustment of goals/treatment have been made. 3. Reduction of reliance on opioid analgesia/more appropriate opioid use. - he is showing progression towards this treatment goal with the current regimen. 4. Ability to focus/concentrate at work and perform the duties required of him at work  Sit through a workday without lower extremity symptoms. Stand 30-60 minutes without lower extremity symptoms. Ability to lift up to 10-20 lbs. Ability to go up and down stairs. Sit 30-60 minutes  Without having to stand up frequently. - he is maintaining/progressing towards his work related goals with the current regimen. Risks and benefits of the medications and other alternative treatments have been/were  discussed with the patient. Any questions on the  common side effects of these medications were also answered.   He was advised against drinking alcohol with the narcotic pain medicines, advised against driving or handling machinery when  starting or adjusting the dose of medicines, feeling groggy or

## 2017-12-12 ENCOUNTER — OFFICE VISIT (OUTPATIENT)
Dept: PAIN MANAGEMENT | Age: 52
End: 2017-12-12

## 2017-12-12 VITALS
HEART RATE: 68 BPM | WEIGHT: 187 LBS | BODY MASS INDEX: 26.83 KG/M2 | DIASTOLIC BLOOD PRESSURE: 76 MMHG | SYSTOLIC BLOOD PRESSURE: 111 MMHG

## 2017-12-12 DIAGNOSIS — S33.5XXA SPRAIN OF LUMBAR REGION, INITIAL ENCOUNTER: ICD-10-CM

## 2017-12-12 DIAGNOSIS — M51.27 PROLAPSED LUMBOSACRAL INTERVERTEBRAL DISC: ICD-10-CM

## 2017-12-12 DIAGNOSIS — S33.5XXA LOW BACK SPRAIN, INITIAL ENCOUNTER: ICD-10-CM

## 2017-12-12 PROCEDURE — 99213 OFFICE O/P EST LOW 20 MIN: CPT | Performed by: INTERNAL MEDICINE

## 2017-12-12 RX ORDER — MORPHINE SULFATE 30 MG/1
30 CAPSULE, EXTENDED RELEASE ORAL DAILY
Qty: 28 CAPSULE | Refills: 0 | Status: SHIPPED | OUTPATIENT
Start: 2017-12-12 | End: 2018-01-09 | Stop reason: SDUPTHER

## 2017-12-12 RX ORDER — MELOXICAM 15 MG/1
15 TABLET ORAL DAILY PRN
Qty: 30 TABLET | Refills: 0 | Status: SHIPPED | OUTPATIENT
Start: 2017-12-12 | End: 2018-01-09 | Stop reason: SDUPTHER

## 2017-12-12 RX ORDER — DULOXETIN HYDROCHLORIDE 30 MG/1
30 CAPSULE, DELAYED RELEASE ORAL DAILY
Qty: 30 CAPSULE | Refills: 0 | Status: SHIPPED | OUTPATIENT
Start: 2017-12-12 | End: 2018-01-09 | Stop reason: SDUPTHER

## 2017-12-12 RX ORDER — HYDROCODONE BITARTRATE AND ACETAMINOPHEN 7.5; 325 MG/1; MG/1
1 TABLET ORAL EVERY 6 HOURS PRN
Qty: 84 TABLET | Refills: 0 | Status: SHIPPED | OUTPATIENT
Start: 2017-12-12 | End: 2018-01-09 | Stop reason: SDUPTHER

## 2017-12-12 NOTE — PROGRESS NOTES
Ba Robinson  1965  [id number]    HISTORY OF PRESENT ILLNESS:  Mr. Jaycee Guerrier is a 45 y.o. male returns for a follow up visit for multiple medical problems. His current presenting problems are   1. BWC Sprain of lumbar region, initial encounter    2. BWC Low back sprain, initial encounter    3. bwc-Prolapsed lumbosacral intervertebral disc    . As per information/history obtained from the PADT(patient assessment and documentation tool) - He complains of pain in the lower back with radiation to the buttocks, hips Bilateral, upper leg Bilateral, knees Bilateral, lower leg Bilateral, ankles Bilateral and feet Bilateral He rates the pain 7/10 and describes it as sharp, aching, numbness. Pain is made worse by: movement, walking, standing, sitting, bending, lifting. Current treatment regimen has helped relieve about 60% of the pain. He denies side effects from the current pain regimen. Patient reports that since the last follow up visit the physical functioning is unchanged, family/social relationships are unchanged, mood is unchanged and sleep patterns are unchanged, and that the overall functioning is unchanged. Patient denies neurological bowel or bladder. Patient denies misusing/abusing his narcotic pain medications or using any illegal drugs. There are No indicators for possible drug abuse, addiction or diversion problems. Upon obtaining the medical history from Mr. Jaycee Guerrier regarding today's office visit for his presenting problems,  Patient complains his neck has been hurting. He says its comes/goes, had issues with it in the past. He mentons his back pain has been baseline, somewhat sore. Patient states his sleep is fair. Has fairly normal sleep latency. Averages about 4-6 hours of sleep a night. Denies any signs of sleep apnea. Feels somewhat rested in the morning. ALLERGIES: Patients list of allergies were reviewed     MEDICATIONS: Mr. Jaycee Guerrier list of medications were reviewed. His current medications are

## 2018-01-09 ENCOUNTER — OFFICE VISIT (OUTPATIENT)
Dept: PAIN MANAGEMENT | Age: 53
End: 2018-01-09

## 2018-01-09 VITALS
WEIGHT: 186 LBS | SYSTOLIC BLOOD PRESSURE: 128 MMHG | HEART RATE: 65 BPM | BODY MASS INDEX: 26.69 KG/M2 | DIASTOLIC BLOOD PRESSURE: 75 MMHG

## 2018-01-09 DIAGNOSIS — S33.5XXA LOW BACK SPRAIN, INITIAL ENCOUNTER: ICD-10-CM

## 2018-01-09 DIAGNOSIS — M51.27 PROLAPSED LUMBOSACRAL INTERVERTEBRAL DISC: ICD-10-CM

## 2018-01-09 DIAGNOSIS — S33.5XXA SPRAIN OF LUMBAR REGION, INITIAL ENCOUNTER: ICD-10-CM

## 2018-01-09 PROCEDURE — 99214 OFFICE O/P EST MOD 30 MIN: CPT | Performed by: INTERNAL MEDICINE

## 2018-01-09 RX ORDER — DULOXETIN HYDROCHLORIDE 30 MG/1
30 CAPSULE, DELAYED RELEASE ORAL DAILY
Qty: 30 CAPSULE | Refills: 0 | Status: SHIPPED | OUTPATIENT
Start: 2018-01-09 | End: 2018-02-06 | Stop reason: SDUPTHER

## 2018-01-09 RX ORDER — PREGABALIN 75 MG/1
CAPSULE ORAL
Qty: 90 CAPSULE | Refills: 0 | Status: SHIPPED | OUTPATIENT
Start: 2018-01-09 | End: 2018-02-06 | Stop reason: SDUPTHER

## 2018-01-09 RX ORDER — MAGNESIUM OXIDE 400 MG/1
TABLET ORAL
Qty: 60 TABLET | Refills: 0 | Status: SHIPPED | OUTPATIENT
Start: 2018-01-09 | End: 2018-02-06 | Stop reason: SDUPTHER

## 2018-01-09 RX ORDER — HYDROCODONE BITARTRATE AND ACETAMINOPHEN 7.5; 325 MG/1; MG/1
1 TABLET ORAL EVERY 6 HOURS PRN
Qty: 84 TABLET | Refills: 0 | Status: SHIPPED | OUTPATIENT
Start: 2018-01-09 | End: 2018-02-06 | Stop reason: SDUPTHER

## 2018-01-09 RX ORDER — MORPHINE SULFATE 30 MG/1
30 CAPSULE, EXTENDED RELEASE ORAL DAILY
Qty: 28 CAPSULE | Refills: 0 | Status: SHIPPED | OUTPATIENT
Start: 2018-01-09 | End: 2018-02-06 | Stop reason: SDUPTHER

## 2018-01-09 RX ORDER — MELOXICAM 15 MG/1
15 TABLET ORAL DAILY PRN
Qty: 30 TABLET | Refills: 0 | Status: SHIPPED | OUTPATIENT
Start: 2018-01-09 | End: 2018-02-06 | Stop reason: SDUPTHER

## 2018-01-09 NOTE — PROGRESS NOTES
Lyndsey Osorio  1965  [id number]    HISTORY OF PRESENT ILLNESS:  Mr. Fracisco Randall is a 45 y.o. male returns for a follow up visit for multiple medical problems. His current presenting problems are   1. [facility] Sprain of lumbar region, initial encounter    2. [facility] Low back sprain, initial encounter    3. bwc-Prolapsed lumbosacral intervertebral disc    . As per information/history obtained from the PADT(patient assessment and documentation tool) - He complains of pain in the lower back and knees Left with radiation to the buttocks He rates the pain 7/10 and describes it as aching, numbness. Pain is made worse by: movement, walking, bending, lifting. Current treatment regimen has helped relieve about 60% of the pain. He denies side effects from the current pain regimen. Patient reports that since the last follow up visit the physical functioning is unchanged, family/social relationships are unchanged, mood is unchanged and sleep patterns are unchanged, and that the overall functioning is unchanged. Patient denies neurological bowel or bladder. Patient denies misusing/abusing his narcotic pain medications or using any illegal drugs. There are No indicators for possible drug abuse, addiction or diversion problems. Upon obtaining the medical history from Mr. Fracisco Randall regarding today's office visit for his presenting problems, patient states back not doing too bad, left shoulder has been hurting a lot along with the upper to mid back, will see Ortho. Mr. Fracisco Randall says he has been having increase pain in the left leg also. Patient states his sleep is fair. Has fairly normal sleep latency. Averages about 4-6 hours of sleep a night. Denies any signs of sleep apnea. Feels somewhat rested in the morning. Patient's  subjective report of his mood is fair. he describes occasional symptoms of depression, occasional  irritability and some mood swings. Describes his mood as being neutral and reports some pleasure in his daily activities. by mouth daily.  carvedilol (COREG) 6.25 MG tablet Take 6.25 mg by mouth 2 times daily (with meals).  atorvastatin (LIPITOR) 80 MG tablet Take 80 mg by mouth nightly.  clopidogrel (PLAVIX) 75 MG tablet Take 75 mg by mouth daily.  lisinopril (PRINIVIL;ZESTRIL) 10 MG tablet Take 10 mg by mouth daily. No facility-administered medications prior to visit. REVIEW OF SYSTEMS:   Constitutional: Negative for fatigue and unexpected weight change. Eyes: Negative for visual disturbance. Respiratory: Negative for shortness of breath. Cardiovascular: Negative for chest pain, palpitations  Gastrointestinal: Negative for blood in stool, abdominal distention, nausea, vomiting, abdominal pain, diarrhea,constipation. Skin: Negative for color change or any abnormal bruising. Neurological: Negative for speech difficulty, weakness and light-headedness, dizziness, tremors, sleepiness  Psychiatric/Behavioral: Negative for suicidal ideas, hallucinations, behavioral problems, self-injury, decreased concentration/cognition, agitation, confusion. PHYSICAL EXAM:   Nursing note and vitals reviewed. /75 (Site: Left Arm, Position: Sitting)   Pulse 65   Wt 186 lb (84.4 kg)   BMI 26.69 kg/m²   General Appearance: Patient is well nourished, well developed, well groomed and in no acute distress. Skin: Skin is warm and dry, good turgor . No rash or lesions noted. He is not diaphoretic. Pulmonary/Chest: Effort normal. No respiratory distress or use of accessory muscles. Auscultation revealing normal air entry. He does not have wheezes in the lung fields. He has no rales. Cardiovascular: Normal rate, regular rhythm, normal heart sounds, and does not have murmur. Exam reveals no gallop and no friction rub. Abdominal: Soft. Bowel sounds are normal.  On inspection of abdomen is flat no distension and no mass. No tenderness. He has no rebound and no guarding.      Musculoskeletal / Extremities: Range of motion is normal. Gait is normal, assistive devices use: none. He exhibits edema: none, and no tenderness. Neurological/Psychiatric:He is alert and oriented to person, place, and time. Coordination is  normal.   Judgement and Insight is normal  His mood is Appropriate and affect is Neutral/Euthymic(normal) . His behavior is normal.   thought content normal.        IMPRESSION:     1. BWC Sprain of lumbar region, initial encounter    2. BWC Low back sprain, initial encounter    3. bwc-Prolapsed lumbosacral intervertebral disc        PLAN:  Informed verbal consent was obtained.  -continue with current regimen  -ROM/Stretching exercises as advised  -he was advised proper sleep hygiene-told to avoid:use of caffeine or other stimulants after noon, alcohol use near bedtime, long or frequent naps during the day, erratic sleep schedule, heavy meals near bedtime, vigorous exercise near bedtime and use of electronic devices near bedtime, continue with Elavil  -start Lyrica 75 mg increase to 225 mg to help with neuropathic pain  -Adv Biofeedback, relaxation and meditation techniques. Referral to psychologist for CBT was also discussed with patient  -continue with Cymbalta  -He was advised to increase fluids ( 5-7  glasses of fluid daily), limit caffeine, avoid cheese products, increase dietary fiber, increase activity and exercise as tolerated and relax regularly and enjoy meals  -Advised caffeine reduction, dietary changes, elevate head end of bed, NPO after supper, if using alcohol advised reduction of alcohol intake, tobacco cessation if smoking, weight loss  Monitor blood sugar regularly, diabetic control- adv diabetic diet. Goal for fasting blood sugars around 120.  Follow up with Endocrinologist/PCP also for on going management     Mr. Loris Duane will be prescribed  the medications  listed below which are for treatment of his presenting  medical problems which for this visit include:   Diagnoses of [facility] Sprain of lumbar region, initial encounter, [facility] Low back sprain, initial encounter, and [facility]-Prolapsed lumbosacral intervertebral disc were pertinent to this visit. Medications/orders associated with this visit:    Current Outpatient Prescriptions   Medication Sig Dispense Refill    HYDROcodone-acetaminophen (NORCO) 7.5-325 MG per tablet Take 1 tablet by mouth every 6 hours as needed for Pain  MAX 3 PER DAY. 84 tablet 0    morphine (LAURA) 30 MG extended release capsule Take 1 capsule by mouth daily . 28 capsule 0    DULoxetine (CYMBALTA) 30 MG extended release capsule Take 1 capsule by mouth daily 30 capsule 0    meloxicam (MOBIC) 15 MG tablet Take 1 tablet by mouth daily as needed for Pain 30 tablet 0    amitriptyline (ELAVIL) 25 MG tablet Take 1 tablet by mouth nightly as needed for Sleep 30 tablet 0    magnesium oxide (MAG-OX) 400 MG tablet Take one tablet Po BID 60 tablet 0    NOVOLOG 100 UNIT/ML injection continuous. Insulin pump averages 50-80 units daily      aspirin 325 MG tablet Take 325 mg by mouth daily.  carvedilol (COREG) 6.25 MG tablet Take 6.25 mg by mouth 2 times daily (with meals).  atorvastatin (LIPITOR) 80 MG tablet Take 80 mg by mouth nightly.  clopidogrel (PLAVIX) 75 MG tablet Take 75 mg by mouth daily.  lisinopril (PRINIVIL;ZESTRIL) 10 MG tablet Take 10 mg by mouth daily. No current facility-administered medications for this visit. Goals of current treatment regimen include improvement in pain, restoration of functioning- with focus on improvement in physical performance, general activity, work or disability,emotional distress, health care utilization and  decreased medication consumption. Will continue to monitor progress towards achieving/maintaining therapeutic goals with special emphasis on  1. Improvement in perceived interfernce  of pain with ADL's. Ability to do home exercises independently.  Ability to do household chores indoor and/or

## 2018-02-06 ENCOUNTER — OFFICE VISIT (OUTPATIENT)
Dept: PAIN MANAGEMENT | Age: 53
End: 2018-02-06

## 2018-02-06 VITALS
DIASTOLIC BLOOD PRESSURE: 88 MMHG | HEART RATE: 80 BPM | BODY MASS INDEX: 26 KG/M2 | SYSTOLIC BLOOD PRESSURE: 134 MMHG | WEIGHT: 181.2 LBS

## 2018-02-06 DIAGNOSIS — S33.5XXA SPRAIN OF LOW BACK, INITIAL ENCOUNTER: ICD-10-CM

## 2018-02-06 DIAGNOSIS — S33.5XXA SPRAIN OF LUMBAR REGION, INITIAL ENCOUNTER: ICD-10-CM

## 2018-02-06 DIAGNOSIS — S33.5XXA LUMBAR SPRAIN, INITIAL ENCOUNTER: ICD-10-CM

## 2018-02-06 DIAGNOSIS — S33.5XXA LOW BACK SPRAIN, INITIAL ENCOUNTER: ICD-10-CM

## 2018-02-06 DIAGNOSIS — M51.27 PROLAPSED LUMBOSACRAL INTERVERTEBRAL DISC: ICD-10-CM

## 2018-02-06 PROCEDURE — 99214 OFFICE O/P EST MOD 30 MIN: CPT | Performed by: INTERNAL MEDICINE

## 2018-02-06 RX ORDER — MELOXICAM 15 MG/1
15 TABLET ORAL DAILY PRN
Qty: 30 TABLET | Refills: 0 | Status: SHIPPED | OUTPATIENT
Start: 2018-02-06 | End: 2018-03-08 | Stop reason: SDUPTHER

## 2018-02-06 RX ORDER — DULOXETIN HYDROCHLORIDE 30 MG/1
30 CAPSULE, DELAYED RELEASE ORAL DAILY
Qty: 30 CAPSULE | Refills: 0 | Status: SHIPPED | OUTPATIENT
Start: 2018-02-06 | End: 2018-03-08 | Stop reason: SDUPTHER

## 2018-02-06 RX ORDER — PREGABALIN 75 MG/1
CAPSULE ORAL
Qty: 90 CAPSULE | Refills: 0 | Status: SHIPPED | OUTPATIENT
Start: 2018-02-06 | End: 2018-03-08

## 2018-02-06 RX ORDER — HYDROCODONE BITARTRATE AND ACETAMINOPHEN 7.5; 325 MG/1; MG/1
1 TABLET ORAL EVERY 6 HOURS PRN
Qty: 90 TABLET | Refills: 0 | Status: SHIPPED | OUTPATIENT
Start: 2018-02-06 | End: 2018-03-08 | Stop reason: SDUPTHER

## 2018-02-06 RX ORDER — METHYLPREDNISOLONE 4 MG/1
TABLET ORAL
Qty: 1 KIT | Refills: 0 | Status: SHIPPED | OUTPATIENT
Start: 2018-02-06 | End: 2018-03-08

## 2018-02-06 RX ORDER — MAGNESIUM OXIDE 400 MG/1
TABLET ORAL
Qty: 60 TABLET | Refills: 0 | Status: SHIPPED | OUTPATIENT
Start: 2018-02-06 | End: 2018-03-08 | Stop reason: SDUPTHER

## 2018-02-06 RX ORDER — AMITRIPTYLINE HYDROCHLORIDE 25 MG/1
25 TABLET, FILM COATED ORAL NIGHTLY PRN
Qty: 30 TABLET | Refills: 0 | Status: SHIPPED | OUTPATIENT
Start: 2018-02-06 | End: 2018-03-08 | Stop reason: SDUPTHER

## 2018-02-06 RX ORDER — MORPHINE SULFATE 30 MG/1
30 CAPSULE, EXTENDED RELEASE ORAL DAILY
Qty: 30 CAPSULE | Refills: 0 | Status: SHIPPED | OUTPATIENT
Start: 2018-02-06 | End: 2018-03-08 | Stop reason: SDUPTHER

## 2018-02-06 NOTE — PROGRESS NOTES
Brynn Mcgee  1965  [id number]    HISTORY OF PRESENT ILLNESS:  Mr. Vivek Cha is a 45 y.o. male returns for a follow up visit for multiple medical problems. His current presenting problems are   1. Lumbar sprain, initial encounter    2. Sprain of low back, initial encounter    3. bwc-Prolapsed lumbosacral intervertebral disc    . As per information/history obtained from the PADT(patient assessment and documentation tool) - He complains of pain in the lower back with radiation to the buttocks, hips Bilateral, upper leg Bilateral, knees Bilateral, lower leg Bilateral, ankles Bilateral and feet Bilateral He rates the pain 8/10 and describes it as sharp, aching, burning, numbness, pins and needles. Pain is made worse by: movement, walking, standing, sitting, bending, lifting. Current treatment regimen has helped relieve about 60% of the pain. He denies side effects from the current pain regimen. Patient reports that since the last follow up visit the physical functioning is worse, family/social relationships are unchanged, mood is worse and sleep patterns are worse, and that the overall functioning is worse. Patient denies neurological bowel or bladder. Patient denies misusing/abusing his narcotic pain medications or using any illegal drugs. There are No indicators for possible drug abuse, addiction or diversion problems. Upon obtaining the medical history from Mr. Vivek Cha regarding today's office visit for his presenting problems, Patient states \" my back has been killing me\". He says it has been on going for about a week, and the pain goes into bilateral legs. He mentions he cant work, had to leave because of the pain. He reports the pain is waking him up at night. Patient's  subjective report of his mood is fair. he describes occasional symptoms of depression, occasional  irritability and some mood swings. Describes his mood as being neutral and reports some pleasure in his daily activities.  Reports  fair am 2 tabs pm. 90 capsule 0    amitriptyline (ELAVIL) 25 MG tablet Take 1 tablet by mouth nightly as needed for Sleep 30 tablet 0    NOVOLOG 100 UNIT/ML injection continuous. Insulin pump averages 50-80 units daily      aspirin 325 MG tablet Take 325 mg by mouth daily.  carvedilol (COREG) 6.25 MG tablet Take 6.25 mg by mouth 2 times daily (with meals).  atorvastatin (LIPITOR) 80 MG tablet Take 80 mg by mouth nightly.  clopidogrel (PLAVIX) 75 MG tablet Take 75 mg by mouth daily.  lisinopril (PRINIVIL;ZESTRIL) 10 MG tablet Take 10 mg by mouth daily. No facility-administered medications prior to visit. REVIEW OF SYSTEMS:   Constitutional: Negative for fatigue and unexpected weight change. Eyes: Negative for visual disturbance. Respiratory: Negative for shortness of breath. Cardiovascular: Negative for chest pain, palpitations  Gastrointestinal: Negative for blood in stool, abdominal distention, nausea, vomiting, abdominal pain, diarrhea,constipation. Skin: Negative for color change or any abnormal bruising. Neurological: Negative for speech difficulty, weakness and light-headedness, dizziness, tremors, sleepiness  Psychiatric/Behavioral: Negative for suicidal ideas, hallucinations, behavioral problems, self-injury, decreased concentration/cognition, agitation, confusion. PHYSICAL EXAM:   Nursing note and vitals reviewed. /88   Pulse 80   Wt 181 lb 3.2 oz (82.2 kg)   BMI 26.00 kg/m²   General Appearance: Patient is well nourished, well developed, well groomed and in no acute distress. Skin: Skin is warm and dry, good turgor . No rash or lesions noted. He is not diaphoretic. Pulmonary/Chest: Effort normal. No respiratory distress or use of accessory muscles. Auscultation revealing normal air entry. He does not have wheezes in the lung fields. He has no rales. Cardiovascular: Normal rate, regular rhythm, normal heart sounds, and does not have murmur.   Exam

## 2018-03-08 ENCOUNTER — OFFICE VISIT (OUTPATIENT)
Dept: PAIN MANAGEMENT | Age: 53
End: 2018-03-08

## 2018-03-08 VITALS
WEIGHT: 180 LBS | BODY MASS INDEX: 25.83 KG/M2 | DIASTOLIC BLOOD PRESSURE: 71 MMHG | HEART RATE: 61 BPM | SYSTOLIC BLOOD PRESSURE: 114 MMHG

## 2018-03-08 DIAGNOSIS — S33.5XXA LUMBAR SPRAIN, INITIAL ENCOUNTER: ICD-10-CM

## 2018-03-08 DIAGNOSIS — M51.27 PROLAPSED LUMBOSACRAL INTERVERTEBRAL DISC: ICD-10-CM

## 2018-03-08 DIAGNOSIS — S33.5XXA SPRAIN OF LOW BACK, INITIAL ENCOUNTER: ICD-10-CM

## 2018-03-08 PROCEDURE — 99213 OFFICE O/P EST LOW 20 MIN: CPT | Performed by: INTERNAL MEDICINE

## 2018-03-08 RX ORDER — HYDROCODONE BITARTRATE AND ACETAMINOPHEN 7.5; 325 MG/1; MG/1
1 TABLET ORAL EVERY 6 HOURS PRN
Qty: 84 TABLET | Refills: 0 | Status: SHIPPED | OUTPATIENT
Start: 2018-03-08 | End: 2018-04-05 | Stop reason: SDUPTHER

## 2018-03-08 RX ORDER — AMITRIPTYLINE HYDROCHLORIDE 25 MG/1
25 TABLET, FILM COATED ORAL NIGHTLY PRN
Qty: 30 TABLET | Refills: 0 | Status: SHIPPED | OUTPATIENT
Start: 2018-03-08 | End: 2018-04-05 | Stop reason: SDUPTHER

## 2018-03-08 RX ORDER — MORPHINE SULFATE 30 MG/1
30 CAPSULE, EXTENDED RELEASE ORAL DAILY
Qty: 28 CAPSULE | Refills: 0 | Status: SHIPPED | OUTPATIENT
Start: 2018-03-08 | End: 2018-04-05 | Stop reason: SDUPTHER

## 2018-03-08 RX ORDER — MELOXICAM 15 MG/1
15 TABLET ORAL DAILY PRN
Qty: 30 TABLET | Refills: 0 | Status: SHIPPED | OUTPATIENT
Start: 2018-03-08 | End: 2018-04-05 | Stop reason: SDUPTHER

## 2018-03-08 RX ORDER — MAGNESIUM OXIDE 400 MG/1
TABLET ORAL
Qty: 60 TABLET | Refills: 0 | Status: SHIPPED | OUTPATIENT
Start: 2018-03-08 | End: 2018-04-05 | Stop reason: SDUPTHER

## 2018-03-08 RX ORDER — DULOXETIN HYDROCHLORIDE 30 MG/1
30 CAPSULE, DELAYED RELEASE ORAL DAILY
Qty: 30 CAPSULE | Refills: 0 | Status: SHIPPED | OUTPATIENT
Start: 2018-03-08 | End: 2018-04-05 | Stop reason: SDUPTHER

## 2018-03-08 NOTE — PROGRESS NOTES
needed for Pain 30 tablet 0    magnesium oxide (MAG-OX) 400 MG tablet Take one tablet Po BID 60 tablet 0    amitriptyline (ELAVIL) 25 MG tablet Take 1 tablet by mouth nightly as needed for Sleep 30 tablet 0    pregabalin (LYRICA) 75 MG capsule take 1 tablet by mouth in the am, take 2 tablets by mouth in the pm. 90 capsule 0    methylPREDNISolone (MEDROL, JAVAD,) 4 MG tablet Use as directed 1 kit 0    NOVOLOG 100 UNIT/ML injection continuous. Insulin pump averages 50-80 units daily      aspirin 325 MG tablet Take 325 mg by mouth daily.  carvedilol (COREG) 6.25 MG tablet Take 6.25 mg by mouth 2 times daily (with meals).  atorvastatin (LIPITOR) 80 MG tablet Take 80 mg by mouth nightly.  clopidogrel (PLAVIX) 75 MG tablet Take 75 mg by mouth daily.  lisinopril (PRINIVIL;ZESTRIL) 10 MG tablet Take 10 mg by mouth daily. No facility-administered medications prior to visit. SOCIAL/FAMILY/PAST MEDICAL HISTORY: Mr. Criss Werner, family and past medical history was reviewed. REVIEW OF SYSTEMS:    Respiratory: Negative for apnea, chest tightness and shortness of breath or change in baseline breathing. Gastrointestinal: Negative for nausea, vomiting, abdominal pain, diarrhea, constipation, blood in stool and abdominal distention. PHYSICAL EXAM:   Nursing note and vitals reviewed. /71   Pulse 61   Wt 180 lb (81.6 kg)   BMI 25.83 kg/m²   Constitutional: He appears well-developed and well-nourished. No acute distress. Skin: Skin is warm and dry, good turgor. No rash noted. He is not diaphoretic. Cardiovascular: Normal rate, regular rhythm, normal heart sounds, and does not have murmur. Pulmonary/Chest: Effort normal. No respiratory distress. He does not have wheezes in the lung fields. He has no rales. Neurological/Psychiatric:He is alert and oriented to person, place, and time.  Coordination is  normal. His mood isAppropriate and affect is Neutral/Euthymic(normal) Jammie Riedel IMPRESSION:   1. C Lumbar sprain, initial encounter    2. BWC Sprain of low back, initial encounter    3. [facility]-Prolapsed lumbosacral intervertebral disc        PLAN:  Informed verbal consent was obtained  -continue with current regimen  -d/c Lyrica  -back exercises as advised   -he was advised proper sleep hygiene-told to avoid:use of caffeine or other stimulants after noon, alcohol use near bedtime, long or frequent naps during the day, erratic sleep schedule, heavy meals near bedtime, vigorous exercise near bedtime and use of electronic devices near bedtime  -He was advised to increase fluids ( 5-7  glasses of fluid daily), limit caffeine, avoid cheese products, increase dietary fiber, increase activity and exercise as tolerated and relax regularly and enjoy meals    Current Outpatient Prescriptions   Medication Sig Dispense Refill    DULoxetine (CYMBALTA) 30 MG extended release capsule Take 1 capsule by mouth daily 30 capsule 0    meloxicam (MOBIC) 15 MG tablet Take 1 tablet by mouth daily as needed for Pain 30 tablet 0    magnesium oxide (MAG-OX) 400 MG tablet Take one tablet Po BID 60 tablet 0    amitriptyline (ELAVIL) 25 MG tablet Take 1 tablet by mouth nightly as needed for Sleep 30 tablet 0    NOVOLOG 100 UNIT/ML injection continuous. Insulin pump averages 50-80 units daily      aspirin 325 MG tablet Take 325 mg by mouth daily.  carvedilol (COREG) 6.25 MG tablet Take 6.25 mg by mouth 2 times daily (with meals).  atorvastatin (LIPITOR) 80 MG tablet Take 80 mg by mouth nightly.  clopidogrel (PLAVIX) 75 MG tablet Take 75 mg by mouth daily.  lisinopril (PRINIVIL;ZESTRIL) 10 MG tablet Take 10 mg by mouth daily. No current facility-administered medications for this visit. I will continue his current medication regimen  which is part of the above treatment schedule.  It has been helping with Mr. Bryan Kline chronic  medical problems which for this visit include:   Diagnoses of [facility] Lumbar sprain, initial encounter, [facility] Sprain of low back, initial encounter, and [facility]-Prolapsed lumbosacral intervertebral disc were pertinent to this visit. Risks and benefits of the medications and other alternative treatments  including no treatment were discussed with the patient. The common side effects of these medications were also explained to the patient. Informed verbal consent was obtained. Goals of current treatment regimen include improvement in pain, restoration of functioning- with focus on improvement in physical performance, general activity, work or disability,emotional distress, health care utilization and  decreased medication consumption. Will continue to monitor progress towards achieving/maintaining therapeutic goals with special emphasis on  1. Improvement in perceived interfernce  of pain with ADL's. Ability to do home exercises independently. Ability to do household chores indoor and/or outdoor work and social and leisure activities. Improve psychosocial and physical functioning. - he is showing progression towards this treatment goal with the current regimen. 2. Ability to focus/concentrate at work and perform the duties required of him at work  Sit through a workday without lower extremity symptoms. Stand 30-60 minutes without lower extremity symptoms. Ability to lift up to 10-20 lbs. Ability to go up and down stairs. Sit 30-60 minutes  Without having to stand up frequently. - he is maintaining/progressing towards his work related goals with the current regimen. He was advised against drinking alcohol with the narcotic pain medicines, advised against driving or handling machinery while adjusting the dose of medicines or if having cognitive  issues related to the current medications. Risk of overdose and death, if medicines not taken as prescribed, were also discussed. If the patient develops new symptoms or if the symptoms worsen, the patient should call the office.     While

## 2018-04-05 ENCOUNTER — OFFICE VISIT (OUTPATIENT)
Dept: PAIN MANAGEMENT | Age: 53
End: 2018-04-05

## 2018-04-05 VITALS
SYSTOLIC BLOOD PRESSURE: 130 MMHG | WEIGHT: 182 LBS | HEART RATE: 75 BPM | DIASTOLIC BLOOD PRESSURE: 80 MMHG | BODY MASS INDEX: 26.11 KG/M2

## 2018-04-05 DIAGNOSIS — S33.5XXA LUMBAR SPRAIN, INITIAL ENCOUNTER: ICD-10-CM

## 2018-04-05 DIAGNOSIS — S33.5XXA SPRAIN OF LOW BACK, INITIAL ENCOUNTER: ICD-10-CM

## 2018-04-05 DIAGNOSIS — M51.27 PROLAPSED LUMBOSACRAL INTERVERTEBRAL DISC: ICD-10-CM

## 2018-04-05 PROCEDURE — 99214 OFFICE O/P EST MOD 30 MIN: CPT | Performed by: INTERNAL MEDICINE

## 2018-04-05 RX ORDER — MAGNESIUM OXIDE 400 MG/1
TABLET ORAL
Qty: 60 TABLET | Refills: 0 | Status: SHIPPED | OUTPATIENT
Start: 2018-04-05 | End: 2018-05-01 | Stop reason: SDUPTHER

## 2018-04-05 RX ORDER — AMITRIPTYLINE HYDROCHLORIDE 25 MG/1
25 TABLET, FILM COATED ORAL NIGHTLY PRN
Qty: 30 TABLET | Refills: 0 | Status: SHIPPED | OUTPATIENT
Start: 2018-04-05 | End: 2018-05-01 | Stop reason: SDUPTHER

## 2018-04-05 RX ORDER — MORPHINE SULFATE 30 MG/1
30 CAPSULE, EXTENDED RELEASE ORAL DAILY
Qty: 28 CAPSULE | Refills: 0 | Status: SHIPPED | OUTPATIENT
Start: 2018-04-05 | End: 2018-05-01 | Stop reason: SDUPTHER

## 2018-04-05 RX ORDER — HYDROCODONE BITARTRATE AND ACETAMINOPHEN 7.5; 325 MG/1; MG/1
1 TABLET ORAL EVERY 6 HOURS PRN
Qty: 84 TABLET | Refills: 0 | Status: SHIPPED | OUTPATIENT
Start: 2018-04-05 | End: 2018-05-01 | Stop reason: SDUPTHER

## 2018-04-05 RX ORDER — DULOXETIN HYDROCHLORIDE 30 MG/1
30 CAPSULE, DELAYED RELEASE ORAL DAILY
Qty: 30 CAPSULE | Refills: 0 | Status: SHIPPED | OUTPATIENT
Start: 2018-04-05 | End: 2018-05-01 | Stop reason: SDUPTHER

## 2018-04-05 RX ORDER — MELOXICAM 15 MG/1
15 TABLET ORAL DAILY PRN
Qty: 30 TABLET | Refills: 0 | Status: SHIPPED | OUTPATIENT
Start: 2018-04-05 | End: 2018-05-01 | Stop reason: SDUPTHER

## 2018-04-13 ENCOUNTER — TELEPHONE (OUTPATIENT)
Dept: PAIN MANAGEMENT | Age: 53
End: 2018-04-13

## 2018-05-01 ENCOUNTER — OFFICE VISIT (OUTPATIENT)
Dept: PAIN MANAGEMENT | Age: 53
End: 2018-05-01

## 2018-05-01 VITALS
SYSTOLIC BLOOD PRESSURE: 157 MMHG | WEIGHT: 180 LBS | DIASTOLIC BLOOD PRESSURE: 90 MMHG | HEART RATE: 73 BPM | BODY MASS INDEX: 25.83 KG/M2

## 2018-05-01 DIAGNOSIS — S33.5XXA LUMBAR SPRAIN, INITIAL ENCOUNTER: ICD-10-CM

## 2018-05-01 DIAGNOSIS — S33.5XXA SPRAIN OF LOW BACK, INITIAL ENCOUNTER: ICD-10-CM

## 2018-05-01 DIAGNOSIS — M51.27 PROLAPSED LUMBOSACRAL INTERVERTEBRAL DISC: ICD-10-CM

## 2018-05-01 PROCEDURE — 99213 OFFICE O/P EST LOW 20 MIN: CPT | Performed by: INTERNAL MEDICINE

## 2018-05-01 RX ORDER — MORPHINE SULFATE 30 MG/1
30 CAPSULE, EXTENDED RELEASE ORAL DAILY
Qty: 30 CAPSULE | Refills: 0 | Status: SHIPPED | OUTPATIENT
Start: 2018-05-01 | End: 2018-05-31 | Stop reason: SDUPTHER

## 2018-05-01 RX ORDER — DULOXETIN HYDROCHLORIDE 30 MG/1
30 CAPSULE, DELAYED RELEASE ORAL DAILY
Qty: 30 CAPSULE | Refills: 0 | Status: SHIPPED | OUTPATIENT
Start: 2018-05-01 | End: 2018-05-31 | Stop reason: SDUPTHER

## 2018-05-01 RX ORDER — MELOXICAM 15 MG/1
15 TABLET ORAL DAILY PRN
Qty: 30 TABLET | Refills: 0 | Status: SHIPPED | OUTPATIENT
Start: 2018-05-01 | End: 2018-05-31 | Stop reason: SDUPTHER

## 2018-05-01 RX ORDER — HYDROCODONE BITARTRATE AND ACETAMINOPHEN 7.5; 325 MG/1; MG/1
1 TABLET ORAL EVERY 6 HOURS PRN
Qty: 90 TABLET | Refills: 0 | Status: SHIPPED | OUTPATIENT
Start: 2018-05-01 | End: 2018-05-31 | Stop reason: SDUPTHER

## 2018-05-01 RX ORDER — AMITRIPTYLINE HYDROCHLORIDE 25 MG/1
25 TABLET, FILM COATED ORAL NIGHTLY PRN
Qty: 30 TABLET | Refills: 0 | Status: SHIPPED | OUTPATIENT
Start: 2018-05-01 | End: 2018-05-31 | Stop reason: SDUPTHER

## 2018-05-01 RX ORDER — MAGNESIUM OXIDE 400 MG/1
TABLET ORAL
Qty: 60 TABLET | Refills: 0 | Status: SHIPPED | OUTPATIENT
Start: 2018-05-01 | End: 2018-05-31 | Stop reason: SDUPTHER

## 2018-05-26 ENCOUNTER — HOSPITAL ENCOUNTER (OUTPATIENT)
Dept: OTHER | Age: 53
Discharge: HOME | End: 2018-05-26
Attending: INTERNAL MEDICINE | Admitting: INTERNAL MEDICINE

## 2018-05-26 DIAGNOSIS — M51.27 PROLAPSED LUMBOSACRAL INTERVERTEBRAL DISC: ICD-10-CM

## 2018-05-26 DIAGNOSIS — S33.5XXA SPRAIN OF LOW BACK, INITIAL ENCOUNTER: ICD-10-CM

## 2018-05-26 DIAGNOSIS — S33.5XXA LUMBAR SPRAIN, INITIAL ENCOUNTER: ICD-10-CM

## 2018-05-26 LAB
A/G RATIO: 2 (ref 1.1–2.2)
ALBUMIN SERPL-MCNC: 4.1 G/DL (ref 3.4–5)
ALP BLD-CCNC: 61 U/L (ref 40–129)
ALT SERPL-CCNC: 16 U/L (ref 10–40)
ANION GAP SERPL CALCULATED.3IONS-SCNC: 12 MMOL/L (ref 3–16)
AST SERPL-CCNC: 16 U/L (ref 15–37)
BILIRUB SERPL-MCNC: 0.7 MG/DL (ref 0–1)
BUN BLDV-MCNC: 11 MG/DL (ref 7–20)
CALCIUM SERPL-MCNC: 8.9 MG/DL (ref 8.3–10.6)
CHLORIDE BLD-SCNC: 99 MMOL/L (ref 99–110)
CO2: 28 MMOL/L (ref 21–32)
CREAT SERPL-MCNC: 0.6 MG/DL (ref 0.9–1.3)
GFR AFRICAN AMERICAN: >60
GFR NON-AFRICAN AMERICAN: >60
GLOBULIN: 2.1 G/DL
GLUCOSE BLD-MCNC: 205 MG/DL (ref 70–99)
HCT VFR BLD CALC: 43.1 % (ref 40.5–52.5)
HEMOGLOBIN: 15.1 G/DL (ref 13.5–17.5)
MCH RBC QN AUTO: 32 PG (ref 26–34)
MCHC RBC AUTO-ENTMCNC: 35.1 G/DL (ref 31–36)
MCV RBC AUTO: 91 FL (ref 80–100)
PDW BLD-RTO: 12.9 % (ref 12.4–15.4)
PLATELET # BLD: 199 K/UL (ref 135–450)
PMV BLD AUTO: 9.7 FL (ref 5–10.5)
POTASSIUM SERPL-SCNC: 4.6 MMOL/L (ref 3.5–5.1)
RBC # BLD: 4.73 M/UL (ref 4.2–5.9)
SODIUM BLD-SCNC: 139 MMOL/L (ref 136–145)
TOTAL PROTEIN: 6.2 G/DL (ref 6.4–8.2)
WBC # BLD: 5.6 K/UL (ref 4–11)

## 2018-05-31 ENCOUNTER — OFFICE VISIT (OUTPATIENT)
Dept: PAIN MANAGEMENT | Age: 53
End: 2018-05-31

## 2018-05-31 VITALS
DIASTOLIC BLOOD PRESSURE: 81 MMHG | SYSTOLIC BLOOD PRESSURE: 129 MMHG | BODY MASS INDEX: 26.93 KG/M2 | HEART RATE: 71 BPM | WEIGHT: 187.7 LBS

## 2018-05-31 DIAGNOSIS — M51.27 PROLAPSED LUMBOSACRAL INTERVERTEBRAL DISC: ICD-10-CM

## 2018-05-31 DIAGNOSIS — S33.5XXA LUMBAR SPRAIN, INITIAL ENCOUNTER: ICD-10-CM

## 2018-05-31 DIAGNOSIS — S33.5XXA SPRAIN OF LOW BACK, INITIAL ENCOUNTER: ICD-10-CM

## 2018-05-31 PROCEDURE — 99214 OFFICE O/P EST MOD 30 MIN: CPT | Performed by: INTERNAL MEDICINE

## 2018-05-31 RX ORDER — MELOXICAM 15 MG/1
15 TABLET ORAL DAILY PRN
Qty: 30 TABLET | Refills: 0 | Status: SHIPPED | OUTPATIENT
Start: 2018-05-31 | End: 2018-06-28 | Stop reason: SDUPTHER

## 2018-05-31 RX ORDER — MORPHINE SULFATE 30 MG/1
30 CAPSULE, EXTENDED RELEASE ORAL DAILY
Qty: 30 CAPSULE | Refills: 0 | Status: SHIPPED | OUTPATIENT
Start: 2018-05-31 | End: 2018-06-28 | Stop reason: SDUPTHER

## 2018-05-31 RX ORDER — DULOXETIN HYDROCHLORIDE 30 MG/1
30 CAPSULE, DELAYED RELEASE ORAL DAILY
Qty: 30 CAPSULE | Refills: 0 | Status: SHIPPED | OUTPATIENT
Start: 2018-05-31 | End: 2018-06-28 | Stop reason: SDUPTHER

## 2018-05-31 RX ORDER — HYDROCODONE BITARTRATE AND ACETAMINOPHEN 7.5; 325 MG/1; MG/1
1 TABLET ORAL EVERY 6 HOURS PRN
Qty: 90 TABLET | Refills: 0 | Status: SHIPPED | OUTPATIENT
Start: 2018-05-31 | End: 2018-06-28 | Stop reason: SDUPTHER

## 2018-05-31 RX ORDER — AMITRIPTYLINE HYDROCHLORIDE 25 MG/1
25 TABLET, FILM COATED ORAL NIGHTLY PRN
Qty: 30 TABLET | Refills: 0 | Status: SHIPPED | OUTPATIENT
Start: 2018-05-31 | End: 2018-06-28 | Stop reason: SDUPTHER

## 2018-05-31 RX ORDER — MAGNESIUM OXIDE 400 MG/1
TABLET ORAL
Qty: 60 TABLET | Refills: 0 | Status: SHIPPED | OUTPATIENT
Start: 2018-05-31 | End: 2018-06-28 | Stop reason: SDUPTHER

## 2018-06-28 ENCOUNTER — OFFICE VISIT (OUTPATIENT)
Dept: PAIN MANAGEMENT | Age: 53
End: 2018-06-28

## 2018-06-28 VITALS
WEIGHT: 185 LBS | DIASTOLIC BLOOD PRESSURE: 74 MMHG | HEART RATE: 80 BPM | SYSTOLIC BLOOD PRESSURE: 119 MMHG | BODY MASS INDEX: 26.54 KG/M2 | OXYGEN SATURATION: 98 %

## 2018-06-28 DIAGNOSIS — S33.5XXA SPRAIN OF LOW BACK, INITIAL ENCOUNTER: ICD-10-CM

## 2018-06-28 DIAGNOSIS — M51.27 PROLAPSED LUMBOSACRAL INTERVERTEBRAL DISC: ICD-10-CM

## 2018-06-28 DIAGNOSIS — S33.5XXA LUMBAR SPRAIN, INITIAL ENCOUNTER: ICD-10-CM

## 2018-06-28 PROCEDURE — 99213 OFFICE O/P EST LOW 20 MIN: CPT | Performed by: INTERNAL MEDICINE

## 2018-06-28 RX ORDER — MORPHINE SULFATE 30 MG/1
30 CAPSULE, EXTENDED RELEASE ORAL DAILY
Qty: 30 CAPSULE | Refills: 0 | Status: CANCELLED | OUTPATIENT
Start: 2018-06-30 | End: 2018-07-30

## 2018-06-28 RX ORDER — MORPHINE SULFATE 30 MG/1
30 CAPSULE, EXTENDED RELEASE ORAL DAILY
Qty: 28 CAPSULE | Refills: 0 | Status: SHIPPED | OUTPATIENT
Start: 2018-06-28 | End: 2018-07-26 | Stop reason: SDUPTHER

## 2018-06-28 RX ORDER — AMITRIPTYLINE HYDROCHLORIDE 25 MG/1
25 TABLET, FILM COATED ORAL NIGHTLY PRN
Qty: 30 TABLET | Refills: 0 | Status: SHIPPED | OUTPATIENT
Start: 2018-06-28 | End: 2018-09-24 | Stop reason: SDUPTHER

## 2018-06-28 RX ORDER — HYDROCODONE BITARTRATE AND ACETAMINOPHEN 7.5; 325 MG/1; MG/1
1 TABLET ORAL EVERY 6 HOURS PRN
Qty: 90 TABLET | Refills: 0 | Status: CANCELLED | OUTPATIENT
Start: 2018-06-30 | End: 2018-07-30

## 2018-06-28 RX ORDER — MAGNESIUM OXIDE 400 MG/1
TABLET ORAL
Qty: 60 TABLET | Refills: 0 | Status: SHIPPED | OUTPATIENT
Start: 2018-06-28 | End: 2018-09-24 | Stop reason: SDUPTHER

## 2018-06-28 RX ORDER — DULOXETIN HYDROCHLORIDE 30 MG/1
30 CAPSULE, DELAYED RELEASE ORAL DAILY
Qty: 30 CAPSULE | Refills: 0 | Status: SHIPPED | OUTPATIENT
Start: 2018-06-28 | End: 2018-09-24 | Stop reason: SDUPTHER

## 2018-06-28 RX ORDER — HYDROCODONE BITARTRATE AND ACETAMINOPHEN 7.5; 325 MG/1; MG/1
1 TABLET ORAL EVERY 6 HOURS PRN
Qty: 84 TABLET | Refills: 0 | Status: SHIPPED | OUTPATIENT
Start: 2018-06-28 | End: 2018-07-26 | Stop reason: SDUPTHER

## 2018-06-28 RX ORDER — MELOXICAM 15 MG/1
15 TABLET ORAL DAILY PRN
Qty: 30 TABLET | Refills: 0 | Status: SHIPPED | OUTPATIENT
Start: 2018-06-28 | End: 2018-09-24 | Stop reason: SDUPTHER

## 2018-07-26 ENCOUNTER — OFFICE VISIT (OUTPATIENT)
Dept: PAIN MANAGEMENT | Age: 53
End: 2018-07-26

## 2018-07-26 VITALS
DIASTOLIC BLOOD PRESSURE: 76 MMHG | WEIGHT: 180 LBS | HEART RATE: 66 BPM | SYSTOLIC BLOOD PRESSURE: 143 MMHG | BODY MASS INDEX: 25.83 KG/M2

## 2018-07-26 DIAGNOSIS — S33.5XXA SPRAIN OF LOW BACK, INITIAL ENCOUNTER: ICD-10-CM

## 2018-07-26 DIAGNOSIS — S33.5XXA LUMBAR SPRAIN, INITIAL ENCOUNTER: ICD-10-CM

## 2018-07-26 DIAGNOSIS — M51.27 PROLAPSED LUMBOSACRAL INTERVERTEBRAL DISC: ICD-10-CM

## 2018-07-26 PROCEDURE — 99214 OFFICE O/P EST MOD 30 MIN: CPT | Performed by: INTERNAL MEDICINE

## 2018-07-26 RX ORDER — HYDROCODONE BITARTRATE AND ACETAMINOPHEN 7.5; 325 MG/1; MG/1
1 TABLET ORAL EVERY 6 HOURS PRN
Qty: 90 TABLET | Refills: 0 | Status: SHIPPED | OUTPATIENT
Start: 2018-07-26 | End: 2018-08-27 | Stop reason: SDUPTHER

## 2018-07-26 RX ORDER — MORPHINE SULFATE 30 MG/1
30 CAPSULE, EXTENDED RELEASE ORAL DAILY
Qty: 30 CAPSULE | Refills: 0 | Status: SHIPPED | OUTPATIENT
Start: 2018-07-26 | End: 2018-08-27 | Stop reason: SDUPTHER

## 2018-07-26 NOTE — PROGRESS NOTES
showing any significant progress/or showing regression  towards this goal and reassessment and adjustment of goals/treatment have been made. 3. Reduction of reliance on opioid analgesia/more appropriate opioid use. - he is showing progression towards this treatment goal with the current regimen. 4. Ability to focus/concentrate at work and perform the duties required of him at work  Sit through a workday without lower extremity symptoms. Stand 30-60 minutes without lower extremity symptoms. Ability to lift up to 10-20 lbs. Ability to go up and down stairs. Sit 30-60 minutes  Without having to stand up frequently. - he is maintaining/progressing towards his work related goals with the current regimen. Risks and benefits of the medications and other alternative treatments have been/were  discussed with the patient. Any questions on the  common side effects of these medications were also answered. He was advised against drinking alcohol with the narcotic pain medicines, advised against driving or handling machinery when  starting or adjusting the dose of medicines, feeling groggy or drowsy, or if having any cognitive issues related to the current medications. Heis fully aware of the risk of overdose and death, if medicines are misused and not taken as prescribed. If he develops new symptoms or if the symptoms worsen, he was told to call the office. .  Thank you for allowing me to participate in the care of this patient.     Shanta Anguiano MD.    Cc: MD JYOTI Urrutia, Claire Ramon, am scribing for and in the presence of Dr. Shanta Anguiano.   07/26/18  5:32 PM  JIMMY Maya, Dr. Shanta Anguiano, personally performed the services described in this documentation as scribed by   Claire Ramon MA in my presence and it is both accurate and complete

## 2018-08-27 ENCOUNTER — OFFICE VISIT (OUTPATIENT)
Dept: PAIN MANAGEMENT | Age: 53
End: 2018-08-27

## 2018-08-27 VITALS
DIASTOLIC BLOOD PRESSURE: 87 MMHG | WEIGHT: 188 LBS | HEART RATE: 78 BPM | BODY MASS INDEX: 26.98 KG/M2 | SYSTOLIC BLOOD PRESSURE: 137 MMHG

## 2018-08-27 DIAGNOSIS — S33.5XXA LUMBAR SPRAIN, INITIAL ENCOUNTER: ICD-10-CM

## 2018-08-27 DIAGNOSIS — S33.5XXA SPRAIN OF LOW BACK, INITIAL ENCOUNTER: ICD-10-CM

## 2018-08-27 DIAGNOSIS — M51.27 PROLAPSED LUMBOSACRAL INTERVERTEBRAL DISC: ICD-10-CM

## 2018-08-27 PROCEDURE — 99214 OFFICE O/P EST MOD 30 MIN: CPT | Performed by: INTERNAL MEDICINE

## 2018-08-27 RX ORDER — HYDROCODONE BITARTRATE AND ACETAMINOPHEN 7.5; 325 MG/1; MG/1
1 TABLET ORAL EVERY 6 HOURS PRN
Qty: 84 TABLET | Refills: 0 | Status: SHIPPED | OUTPATIENT
Start: 2018-08-27 | End: 2018-09-24 | Stop reason: SDUPTHER

## 2018-08-27 RX ORDER — MORPHINE SULFATE 30 MG/1
30 CAPSULE, EXTENDED RELEASE ORAL DAILY
Qty: 28 CAPSULE | Refills: 0 | Status: SHIPPED | OUTPATIENT
Start: 2018-08-27 | End: 2018-09-24 | Stop reason: SDUPTHER

## 2018-08-27 NOTE — PROGRESS NOTES
[facility]  1965  [id number]    HISTORY OF PRESENT ILLNESS:  Mr. Caro Cano is a 52 y.o. male returns for a follow up visit for multiple medical problems. His current presenting problems are   1. bwc-Prolapsed lumbosacral intervertebral disc    2. [facility] Lumbar sprain, initial encounter    3. [facility] Sprain of low back, initial encounter    4. Sprain of low back, initial encounter    . As per information/history obtained from the PADT(patient assessment and documentation tool) - He complains of pain in the lower back with radiation to the buttocks and hips Bilateral He rates the pain 6/10 and describes it as aching, burning, numbness. Pain is made worse by: movement, walking, standing, bending, lifting. Current treatment regimen has helped relieve about 60% of the pain. He denies side effects from the current pain regimen. Patient reports that since the last follow up visit the physical functioning is unchanged, family/social relationships are unchanged, mood is unchanged and sleep patterns are unchanged, and that the overall functioning is unchanged. Patient denies neurological bowel or bladder. Patient denies misusing/abusing his narcotic pain medications or using any illegal drugs. There are No indicators for possible drug abuse, addiction or diversion problems. Upon obtaining the medical history from Mr. Caro Cano regarding today's office visit for his presenting problems, Patient states he has been doing fair. He complains his leep is poor,  poor sleep latency, averages 2-3 hours of sleep at night. Intermittent, non restorative. Does not feel rested in AM. Complains of feeling sleepy  during the day. He says he is using Elavil 10 mg at night, but does not help sometimes. He mentions he is working full time. He reports \" My pain has been slightly worse and I'm having occasional bad days\". He says he is waiting on results of sleep apnea test. Patient's  subjective report of his mood is fair.  he describes occasional mouth daily as needed for Pain 30 tablet 0    DULoxetine (CYMBALTA) 30 MG extended release capsule Take 1 capsule by mouth daily 30 capsule 0    NOVOLOG 100 UNIT/ML injection continuous. Insulin pump averages 50-80 units daily      aspirin 325 MG tablet Take 325 mg by mouth daily.  carvedilol (COREG) 6.25 MG tablet Take 6.25 mg by mouth 2 times daily (with meals).  atorvastatin (LIPITOR) 80 MG tablet Take 80 mg by mouth nightly.  clopidogrel (PLAVIX) 75 MG tablet Take 75 mg by mouth daily.  lisinopril (PRINIVIL;ZESTRIL) 10 MG tablet Take 10 mg by mouth daily. No current facility-administered medications for this visit. Goals of current treatment regimen include improvement in pain, restoration of functioning- with focus on improvement in physical performance, general activity, work or disability,emotional distress, health care utilization and  decreased medication consumption. Will continue to monitor progress towards achieving/maintaining therapeutic goals with special emphasis on  1. Improvement in perceived interfernce  of pain with ADL's. Ability to do home exercises independently. Ability to do household chores indoor and/or outdoor work and social and leisure activities. To increase flexibility/ROM, strength and endurance. Improve psychosocial and physical functioning.- he is showing progression towards this treatment goal with the current regimen. 2. Improving sleep to 6-7 hours a night. Improve mood/ anxiety and depression symptoms such as crying spells, low energy, problems with concentration, motivation.- he is not showing any significant progress/or showing regression  towards this goal and reassessment and adjustment of goals/treatment have been made. 3. Reduction of reliance on opioid analgesia/more appropriate opioid use. - he is showing progression towards this treatment goal with the current regimen.     4. Ability to focus/concentrate at work and perform the

## 2018-09-24 ENCOUNTER — OFFICE VISIT (OUTPATIENT)
Dept: PAIN MANAGEMENT | Age: 53
End: 2018-09-24
Payer: COMMERCIAL

## 2018-09-24 VITALS
HEART RATE: 66 BPM | BODY MASS INDEX: 26.98 KG/M2 | WEIGHT: 188 LBS | SYSTOLIC BLOOD PRESSURE: 125 MMHG | DIASTOLIC BLOOD PRESSURE: 72 MMHG

## 2018-09-24 DIAGNOSIS — S33.5XXA SPRAIN OF LOW BACK, INITIAL ENCOUNTER: ICD-10-CM

## 2018-09-24 DIAGNOSIS — S33.5XXA LUMBAR SPRAIN, INITIAL ENCOUNTER: ICD-10-CM

## 2018-09-24 DIAGNOSIS — M51.27 PROLAPSED LUMBOSACRAL INTERVERTEBRAL DISC: ICD-10-CM

## 2018-09-24 PROCEDURE — 99213 OFFICE O/P EST LOW 20 MIN: CPT | Performed by: INTERNAL MEDICINE

## 2018-09-24 RX ORDER — MAGNESIUM OXIDE 400 MG/1
TABLET ORAL
Qty: 60 TABLET | Refills: 0 | Status: SHIPPED | OUTPATIENT
Start: 2018-09-24 | End: 2018-12-20 | Stop reason: SDUPTHER

## 2018-09-24 RX ORDER — AMITRIPTYLINE HYDROCHLORIDE 25 MG/1
25 TABLET, FILM COATED ORAL NIGHTLY PRN
Qty: 30 TABLET | Refills: 0 | Status: SHIPPED | OUTPATIENT
Start: 2018-09-24 | End: 2018-11-19 | Stop reason: SDUPTHER

## 2018-09-24 RX ORDER — HYDROCODONE BITARTRATE AND ACETAMINOPHEN 7.5; 325 MG/1; MG/1
1 TABLET ORAL EVERY 6 HOURS PRN
Qty: 84 TABLET | Refills: 0 | Status: SHIPPED | OUTPATIENT
Start: 2018-09-24 | End: 2018-10-13 | Stop reason: SDUPTHER

## 2018-09-24 RX ORDER — MORPHINE SULFATE 30 MG/1
30 CAPSULE, EXTENDED RELEASE ORAL DAILY
Qty: 28 CAPSULE | Refills: 0 | Status: SHIPPED | OUTPATIENT
Start: 2018-09-24 | End: 2018-10-13 | Stop reason: SDUPTHER

## 2018-09-24 RX ORDER — MELOXICAM 15 MG/1
15 TABLET ORAL DAILY PRN
Qty: 30 TABLET | Refills: 0 | Status: SHIPPED | OUTPATIENT
Start: 2018-09-24 | End: 2019-01-18

## 2018-09-24 RX ORDER — DULOXETIN HYDROCHLORIDE 30 MG/1
30 CAPSULE, DELAYED RELEASE ORAL DAILY
Qty: 30 CAPSULE | Refills: 0 | Status: SHIPPED | OUTPATIENT
Start: 2018-09-24 | End: 2018-11-19 | Stop reason: SDUPTHER

## 2018-09-24 NOTE — PROGRESS NOTES
Monica Corpus  1965  [id number]    HISTORY OF PRESENT ILLNESS:  Mr. Rafa Powell is a 52 y.o. male returns for a follow up visit for multiple medical problems. His current presenting problems are   1. bwc-Prolapsed lumbosacral intervertebral disc    2. BWC Lumbar sprain, initial encounter    3. BWC Sprain of low back, initial encounter    4. Lumbar sprain, initial encounter    5. Sprain of low back, initial encounter    . As per information/history obtained from the PADT(patient assessment and documentation tool) - He complains of pain in the lower back and knees Left with radiation to the buttocks, hips Left and upper leg Left He rates the pain 7/10 and describes it as aching, numbness, pins and needles. Pain is made worse by: movement, standing, bending, lifting. Current treatment regimen has helped relieve about 60% of the pain. He denies side effects from the current pain regimen. Patient reports that since the last follow up visit the physical functioning is unchanged, family/social relationships are unchanged, mood is unchanged and sleep patterns are unchanged, and that the overall functioning is unchanged. Patient denies neurological bowel or bladder. Patient denies misusing/abusing his narcotic pain medications or using any illegal drugs. There are No indicators for possible drug abuse, addiction or diversion problems. Lidia Henderson Upon obtaining the medical history from Mr. Rafa Powell regarding today's office visit for his presenting problems, patient states he has been having some increase pain in the back for the last few days. Mr. Rafa Powell states he is working full time still, he says he has been busy at work. Patient states his sleep is fair. Has fairly normal sleep latency. Averages about 4-6 hours of sleep a night. Denies any signs of sleep apnea. Feels somewhat rested in the morning. He says he has been complaint with his medications.        ALLERGIES: Patients list of allergies were reviewed     MEDICATIONS: Mr. Rafa Powell treatment goal with the current regimen. He was advised against drinking alcohol with the narcotic pain medicines, advised against driving or handling machinery while adjusting the dose of medicines or if having cognitive  issues related to the current medications. Risk of overdose and death, if medicines not taken as prescribed, were also discussed. If the patient develops new symptoms or if the symptoms worsen, the patient should call the office. While transcribing every attempt was made to maintain the accuracy of the note in terms of it's contents,there may have been some errors made inadvertently. Thank you for allowing me to participate in the care of this patient. Onur Gonzales MD.    Cc: MD Luli Ortez, scribing for in the presence  of Dr. Onur Gonzales.   09/24/18  4:32 PM  Caro Blandon.  Ximena Hawk Assistant  I, Dr. Onur Gonzales, personally performed the services described in this documentation as scribed by  Eliceo Johnson MA in my presence and it is both accurate and complete

## 2018-10-13 ENCOUNTER — OFFICE VISIT (OUTPATIENT)
Dept: PAIN MANAGEMENT | Age: 53
End: 2018-10-13
Payer: COMMERCIAL

## 2018-10-13 VITALS
SYSTOLIC BLOOD PRESSURE: 156 MMHG | BODY MASS INDEX: 26.54 KG/M2 | HEART RATE: 75 BPM | DIASTOLIC BLOOD PRESSURE: 76 MMHG | WEIGHT: 185 LBS

## 2018-10-13 DIAGNOSIS — S33.5XXA SPRAIN OF LOW BACK, INITIAL ENCOUNTER: ICD-10-CM

## 2018-10-13 DIAGNOSIS — S33.5XXA LUMBAR SPRAIN, INITIAL ENCOUNTER: ICD-10-CM

## 2018-10-13 DIAGNOSIS — M51.27 PROLAPSED LUMBOSACRAL INTERVERTEBRAL DISC: ICD-10-CM

## 2018-10-13 PROCEDURE — 99214 OFFICE O/P EST MOD 30 MIN: CPT | Performed by: INTERNAL MEDICINE

## 2018-10-13 RX ORDER — MORPHINE SULFATE 30 MG/1
30 CAPSULE, EXTENDED RELEASE ORAL DAILY
Qty: 28 CAPSULE | Refills: 0 | Status: SHIPPED | OUTPATIENT
Start: 2018-10-13 | End: 2018-11-10

## 2018-10-13 RX ORDER — HYDROCODONE BITARTRATE AND ACETAMINOPHEN 7.5; 325 MG/1; MG/1
1 TABLET ORAL EVERY 6 HOURS PRN
Qty: 84 TABLET | Refills: 0 | Status: SHIPPED | OUTPATIENT
Start: 2018-10-13 | End: 2018-11-19 | Stop reason: SDUPTHER

## 2018-10-13 NOTE — PROGRESS NOTES
Arlin Clements  1965  [id number]    HISTORY OF PRESENT ILLNESS:  Mr. Marcella Chakraborty is a 52 y.o. male returns for a follow up visit for multiple medical problems. His current presenting problems are   1. bwc-Prolapsed lumbosacral intervertebral disc    2. BWC Lumbar sprain, initial encounter    3. BWC Sprain of low back, initial encounter    4. Lumbar sprain, initial encounter    5. Sprain of low back, initial encounter    . As per information/history obtained from the PADT(patient assessment and documentation tool) - He complains of pain in the lower back and knees Left with radiation to the buttocks, hips Left, upper leg Left and lower leg Left He rates the pain 7/10 and describes it as aching, burning, throbbing, pins and needles. Pain is made worse by: movement, standing, bending, lifting. Current treatment regimen has helped relieve about 60% of the pain. He denies side effects from the current pain regimen. Patient reports that since the last follow up visit the physical functioning is unchanged, family/social relationships are unchanged, mood is unchanged and sleep patterns are unchanged, and that the overall functioning is worse. Patient denies neurological bowel or bladder. Patient denies misusing/abusing his narcotic pain medications or using any illegal drugs. There are No indicators for possible drug abuse, addiction or diversion problems. Jona Caldwell Upon obtaining the medical history from Mr. Marcella Chakraborty regarding today's office visit for his presenting problems, He complains of increased pain with changes in weather. Extreme temperatures- cold and damp weather causes increased pain. Mr. Marcella Chakraborty states he is having increase pain in the lower back, very sore. He mentions his leg symptoms have been tolerable, no radicular pain but tender to touch. He reports he is working full time still. He mentions he has been using Cymbalta but has quit taking the Lyrica. Patient states his sleep is fair. Has fairly normal sleep latency. by mouth nightly as needed for Sleep 30 tablet 0    magnesium oxide (MAG-OX) 400 MG tablet Take one tablet Po BID 60 tablet 0    meloxicam (MOBIC) 15 MG tablet Take 1 tablet by mouth daily as needed for Pain 30 tablet 0    DULoxetine (CYMBALTA) 30 MG extended release capsule Take 1 capsule by mouth daily 30 capsule 0    NOVOLOG 100 UNIT/ML injection continuous. Insulin pump averages 50-80 units daily      aspirin 325 MG tablet Take 325 mg by mouth daily.  carvedilol (COREG) 6.25 MG tablet Take 6.25 mg by mouth 2 times daily (with meals).  atorvastatin (LIPITOR) 80 MG tablet Take 80 mg by mouth nightly.  clopidogrel (PLAVIX) 75 MG tablet Take 75 mg by mouth daily.  lisinopril (PRINIVIL;ZESTRIL) 10 MG tablet Take 10 mg by mouth daily. No facility-administered medications prior to visit. REVIEW OF SYSTEMS:   Constitutional: Negative for fatigue and unexpected weight change. Eyes: Negative for visual disturbance. Respiratory: Negative for shortness of breath. Cardiovascular: Negative for chest pain, palpitations  Gastrointestinal: Negative for blood in stool, abdominal distention, nausea, vomiting, abdominal pain, diarrhea,constipation. Skin: Negative for color change or any abnormal bruising. Neurological: Negative for speech difficulty, weakness and light-headedness, dizziness, tremors, sleepiness  Psychiatric/Behavioral: Negative for suicidal ideas, hallucinations, behavioral problems, self-injury, decreased concentration/cognition, agitation, confusion. PHYSICAL EXAM:   Nursing note and vitals reviewed. BP (!) 156/76   Pulse 75   Wt 185 lb (83.9 kg)   BMI 26.54 kg/m²   General Appearance: Patient is well nourished, well developed, well groomed and in no acute distress. Skin: Skin is warm and dry, good turgor . No rash or lesions noted. He is not diaphoretic. Pulmonary/Chest: Effort normal. No respiratory distress or use of accessory muscles.  Auscultation

## 2018-11-19 ENCOUNTER — OFFICE VISIT (OUTPATIENT)
Dept: PAIN MANAGEMENT | Age: 53
End: 2018-11-19
Payer: COMMERCIAL

## 2018-11-19 VITALS
DIASTOLIC BLOOD PRESSURE: 91 MMHG | HEART RATE: 76 BPM | BODY MASS INDEX: 25.83 KG/M2 | WEIGHT: 180 LBS | SYSTOLIC BLOOD PRESSURE: 140 MMHG

## 2018-11-19 DIAGNOSIS — S33.5XXA LOW BACK SPRAIN, INITIAL ENCOUNTER: ICD-10-CM

## 2018-11-19 DIAGNOSIS — S33.5XXA LUMBAR SPRAIN, INITIAL ENCOUNTER: ICD-10-CM

## 2018-11-19 DIAGNOSIS — G89.4 CHRONIC PAIN SYNDROME: Chronic | ICD-10-CM

## 2018-11-19 DIAGNOSIS — M51.27 PROLAPSED LUMBOSACRAL INTERVERTEBRAL DISC: ICD-10-CM

## 2018-11-19 DIAGNOSIS — S33.5XXA SPRAIN OF LOW BACK, INITIAL ENCOUNTER: ICD-10-CM

## 2018-11-19 DIAGNOSIS — S33.5XXA SPRAIN OF LUMBAR REGION, INITIAL ENCOUNTER: ICD-10-CM

## 2018-11-19 PROCEDURE — 99214 OFFICE O/P EST MOD 30 MIN: CPT | Performed by: INTERNAL MEDICINE

## 2018-11-19 RX ORDER — MORPHINE SULFATE 30 MG/1
30 CAPSULE, EXTENDED RELEASE ORAL DAILY
Qty: 30 CAPSULE | Refills: 0 | Status: SHIPPED | OUTPATIENT
Start: 2018-11-19 | End: 2018-12-20 | Stop reason: SDUPTHER

## 2018-11-19 RX ORDER — AMITRIPTYLINE HYDROCHLORIDE 25 MG/1
25 TABLET, FILM COATED ORAL NIGHTLY PRN
Qty: 30 TABLET | Refills: 0 | Status: SHIPPED | OUTPATIENT
Start: 2018-11-19 | End: 2018-12-20 | Stop reason: SDUPTHER

## 2018-11-19 RX ORDER — MELOXICAM 15 MG/1
15 TABLET ORAL DAILY PRN
Qty: 30 TABLET | Refills: 0 | Status: SHIPPED | OUTPATIENT
Start: 2018-11-19 | End: 2018-12-20 | Stop reason: SDUPTHER

## 2018-11-19 RX ORDER — METHYLPREDNISOLONE 4 MG/1
TABLET ORAL
Qty: 1 KIT | Refills: 0 | Status: SHIPPED | OUTPATIENT
Start: 2018-11-19 | End: 2018-12-20

## 2018-11-19 RX ORDER — DULOXETIN HYDROCHLORIDE 30 MG/1
30 CAPSULE, DELAYED RELEASE ORAL DAILY
Qty: 30 CAPSULE | Refills: 0 | Status: SHIPPED | OUTPATIENT
Start: 2018-11-19 | End: 2018-12-20 | Stop reason: SDUPTHER

## 2018-11-19 RX ORDER — HYDROCODONE BITARTRATE AND ACETAMINOPHEN 7.5; 325 MG/1; MG/1
1 TABLET ORAL EVERY 6 HOURS PRN
Qty: 90 TABLET | Refills: 0 | Status: SHIPPED | OUTPATIENT
Start: 2018-11-19 | End: 2018-12-20 | Stop reason: SDUPTHER

## 2018-11-19 NOTE — PROGRESS NOTES
performance, general activity, work or disability,emotional distress, health care utilization and  decreased medication consumption. Will continue to monitor progress towards achieving/maintaining therapeutic goals with special emphasis on  1. Improvement in perceived interfernce  of pain with ADL's. Ability to do home exercises independently. Ability to do household chores indoor and/or outdoor work and social and leisure activities. To increase flexibility/ROM, strength and endurance. Improve psychosocial and physical functioning.- he is not showing any significant progress/or showing regression  towards this goal and reassessment and adjustment of goals/treatment have been made. 2. Improving sleep to 6-7 hours a night. Improve mood/ anxiety and depression symptoms such as crying spells, low energy, problems with concentration, motivation.- he is showing progression towards this treatment goal with the current regimen. 3. Reduction of reliance on opioid analgesia/more appropriate opioid use. - he is showing progression towards this treatment goal with the current regimen. 4.Ability to focus/concentrate at work and perform the duties required of him at work  Sit through a workday without lower extremity symptoms. Stand 30-60 minutes without lower extremity symptoms. Ability to lift up to 10-20 lbs. Ability to go up and down stairs. Sit 30-60 minutes  Without having to stand up frequently. - he is maintaining/progressing towards his work related goals with the current regimen. Risks and benefits of the medications and other alternative treatments have been/were  discussed with the patient. Any questions on the  common side effects of these medications were also answered.   He was advised against drinking alcohol with the narcotic pain medicines, advised against driving or handling machinery when  starting or adjusting the dose of medicines, feeling groggy or drowsy, or if having any cognitive issues related to the current medications. Heis fully aware of the risk of overdose and death, if medicines are misused and not taken as prescribed. If he develops new symptoms or if the symptoms worsen, he was told to call the office. .  Thank you for allowing me to participate in the care of this patient.     Marquis Magdalene MD.    Cc: Helene Pal MD

## 2018-12-20 ENCOUNTER — OFFICE VISIT (OUTPATIENT)
Dept: PAIN MANAGEMENT | Age: 53
End: 2018-12-20
Payer: COMMERCIAL

## 2018-12-20 VITALS
SYSTOLIC BLOOD PRESSURE: 137 MMHG | WEIGHT: 180 LBS | HEART RATE: 77 BPM | DIASTOLIC BLOOD PRESSURE: 76 MMHG | BODY MASS INDEX: 25.83 KG/M2

## 2018-12-20 DIAGNOSIS — S33.5XXA SPRAIN OF LOW BACK, INITIAL ENCOUNTER: ICD-10-CM

## 2018-12-20 DIAGNOSIS — S33.5XXA LUMBAR SPRAIN, INITIAL ENCOUNTER: ICD-10-CM

## 2018-12-20 DIAGNOSIS — S33.5XXA LOW BACK SPRAIN, INITIAL ENCOUNTER: ICD-10-CM

## 2018-12-20 DIAGNOSIS — G89.4 CHRONIC PAIN SYNDROME: Chronic | ICD-10-CM

## 2018-12-20 DIAGNOSIS — S33.5XXA SPRAIN OF LUMBAR REGION, INITIAL ENCOUNTER: ICD-10-CM

## 2018-12-20 DIAGNOSIS — M51.27 PROLAPSED LUMBOSACRAL INTERVERTEBRAL DISC: ICD-10-CM

## 2018-12-20 PROCEDURE — 99213 OFFICE O/P EST LOW 20 MIN: CPT | Performed by: INTERNAL MEDICINE

## 2018-12-20 RX ORDER — AMITRIPTYLINE HYDROCHLORIDE 25 MG/1
25 TABLET, FILM COATED ORAL NIGHTLY PRN
Qty: 30 TABLET | Refills: 0 | Status: SHIPPED | OUTPATIENT
Start: 2018-12-20 | End: 2019-02-15 | Stop reason: SDUPTHER

## 2018-12-20 RX ORDER — MORPHINE SULFATE 30 MG/1
30 CAPSULE, EXTENDED RELEASE ORAL DAILY
Qty: 30 CAPSULE | Refills: 0 | Status: SHIPPED | OUTPATIENT
Start: 2018-12-20 | End: 2019-01-18 | Stop reason: SDUPTHER

## 2018-12-20 RX ORDER — DULOXETIN HYDROCHLORIDE 30 MG/1
30 CAPSULE, DELAYED RELEASE ORAL DAILY
Qty: 30 CAPSULE | Refills: 0 | Status: SHIPPED | OUTPATIENT
Start: 2018-12-20 | End: 2019-02-15 | Stop reason: SDUPTHER

## 2018-12-20 RX ORDER — MAGNESIUM OXIDE 400 MG/1
TABLET ORAL
Qty: 60 TABLET | Refills: 0 | Status: SHIPPED | OUTPATIENT
Start: 2018-12-20 | End: 2019-02-15 | Stop reason: SDUPTHER

## 2018-12-20 RX ORDER — MELOXICAM 15 MG/1
15 TABLET ORAL DAILY PRN
Qty: 30 TABLET | Refills: 0 | Status: SHIPPED | OUTPATIENT
Start: 2018-12-20 | End: 2019-02-15 | Stop reason: SDUPTHER

## 2018-12-20 RX ORDER — HYDROCODONE BITARTRATE AND ACETAMINOPHEN 7.5; 325 MG/1; MG/1
1 TABLET ORAL EVERY 6 HOURS PRN
Qty: 90 TABLET | Refills: 0 | Status: SHIPPED | OUTPATIENT
Start: 2018-12-20 | End: 2019-01-18 | Stop reason: SDUPTHER

## 2018-12-20 NOTE — PROGRESS NOTES
capsule 0    magnesium oxide (MAG-OX) 400 MG tablet Take one tablet Po BID 60 tablet 0    meloxicam (MOBIC) 15 MG tablet Take 1 tablet by mouth daily as needed for Pain 30 tablet 0    NOVOLOG 100 UNIT/ML injection continuous. Insulin pump averages 50-80 units daily      aspirin 325 MG tablet Take 325 mg by mouth daily.  carvedilol (COREG) 6.25 MG tablet Take 6.25 mg by mouth 2 times daily (with meals).  atorvastatin (LIPITOR) 80 MG tablet Take 80 mg by mouth nightly.  clopidogrel (PLAVIX) 75 MG tablet Take 75 mg by mouth daily.  lisinopril (PRINIVIL;ZESTRIL) 10 MG tablet Take 10 mg by mouth daily.  methylPREDNISolone (MEDROL, JAVAD,) 4 MG tablet Use as directed 1 kit 0     No facility-administered medications prior to visit. SOCIAL/FAMILY/PAST MEDICAL HISTORY: Mr. Ismael Trammell, family and past medical history was reviewed. REVIEW OF SYSTEMS:    Respiratory: Negative for apnea, chest tightness and shortness of breath or change in baseline breathing. Gastrointestinal: Negative for nausea, vomiting, abdominal pain, diarrhea, constipation, blood in stool and abdominal distention. PHYSICAL EXAM:   Nursing note and vitals reviewed. /76   Pulse 77   Wt 180 lb (81.6 kg)   BMI 25.83 kg/m²   Constitutional: He appears well-developed and well-nourished. No acute distress. Skin: Skin is warm and dry, good turgor. No rash noted. He is not diaphoretic. Cardiovascular: Normal rate, regular rhythm, normal heart sounds, and does not have murmur. Pulmonary/Chest: Effort normal. No respiratory distress. He does not have wheezes in the lung fields. He has no rales. Neurological/Psychiatric:He is alert and oriented to person, place, and time. Coordination is  normal. His mood isAppropriate and affect is Neutral/Euthymic(normal) . IMPRESSION:   1. bwc-Prolapsed lumbosacral intervertebral disc    2. BWC Sprain of lumbar region, initial encounter    3.  BWC Low back

## 2019-01-18 ENCOUNTER — OFFICE VISIT (OUTPATIENT)
Dept: PAIN MANAGEMENT | Age: 54
End: 2019-01-18
Payer: COMMERCIAL

## 2019-01-18 VITALS
DIASTOLIC BLOOD PRESSURE: 88 MMHG | WEIGHT: 185 LBS | BODY MASS INDEX: 26.54 KG/M2 | SYSTOLIC BLOOD PRESSURE: 139 MMHG | HEART RATE: 75 BPM

## 2019-01-18 DIAGNOSIS — S33.5XXA LOW BACK SPRAIN, INITIAL ENCOUNTER: ICD-10-CM

## 2019-01-18 DIAGNOSIS — S33.5XXA SPRAIN OF LUMBAR REGION, INITIAL ENCOUNTER: ICD-10-CM

## 2019-01-18 DIAGNOSIS — S33.5XXA SPRAIN OF LOW BACK, INITIAL ENCOUNTER: ICD-10-CM

## 2019-01-18 DIAGNOSIS — M51.27 PROLAPSED LUMBOSACRAL INTERVERTEBRAL DISC: ICD-10-CM

## 2019-01-18 DIAGNOSIS — S33.5XXA LUMBAR SPRAIN, INITIAL ENCOUNTER: ICD-10-CM

## 2019-01-18 DIAGNOSIS — G89.4 CHRONIC PAIN SYNDROME: Chronic | ICD-10-CM

## 2019-01-18 PROCEDURE — 99214 OFFICE O/P EST MOD 30 MIN: CPT | Performed by: INTERNAL MEDICINE

## 2019-01-18 RX ORDER — HYDROCODONE BITARTRATE AND ACETAMINOPHEN 7.5; 325 MG/1; MG/1
1 TABLET ORAL EVERY 6 HOURS PRN
Qty: 84 TABLET | Refills: 0 | Status: SHIPPED | OUTPATIENT
Start: 2019-01-18 | End: 2019-02-15 | Stop reason: SDUPTHER

## 2019-01-18 RX ORDER — MORPHINE SULFATE 30 MG/1
30 CAPSULE, EXTENDED RELEASE ORAL DAILY
Qty: 28 CAPSULE | Refills: 0 | Status: SHIPPED | OUTPATIENT
Start: 2019-01-18 | End: 2019-02-15 | Stop reason: SDUPTHER

## 2019-02-15 ENCOUNTER — OFFICE VISIT (OUTPATIENT)
Dept: PAIN MANAGEMENT | Age: 54
End: 2019-02-15
Payer: COMMERCIAL

## 2019-02-15 VITALS
HEART RATE: 62 BPM | DIASTOLIC BLOOD PRESSURE: 78 MMHG | SYSTOLIC BLOOD PRESSURE: 137 MMHG | WEIGHT: 185 LBS | BODY MASS INDEX: 26.54 KG/M2

## 2019-02-15 DIAGNOSIS — M51.27 PROLAPSED LUMBOSACRAL INTERVERTEBRAL DISC: ICD-10-CM

## 2019-02-15 DIAGNOSIS — S33.5XXA SPRAIN OF LUMBAR REGION, INITIAL ENCOUNTER: ICD-10-CM

## 2019-02-15 DIAGNOSIS — S33.5XXA SPRAIN OF LOW BACK, INITIAL ENCOUNTER: ICD-10-CM

## 2019-02-15 DIAGNOSIS — G89.4 CHRONIC PAIN SYNDROME: Chronic | ICD-10-CM

## 2019-02-15 DIAGNOSIS — S33.5XXA LUMBAR SPRAIN, INITIAL ENCOUNTER: ICD-10-CM

## 2019-02-15 DIAGNOSIS — S33.5XXA LOW BACK SPRAIN, INITIAL ENCOUNTER: ICD-10-CM

## 2019-02-15 PROCEDURE — 99214 OFFICE O/P EST MOD 30 MIN: CPT | Performed by: INTERNAL MEDICINE

## 2019-02-15 RX ORDER — METHYLPREDNISOLONE 4 MG/1
TABLET ORAL
Qty: 1 KIT | Refills: 0 | Status: SHIPPED | OUTPATIENT
Start: 2019-02-15 | End: 2019-03-15

## 2019-02-15 RX ORDER — MELOXICAM 15 MG/1
15 TABLET ORAL DAILY PRN
Qty: 30 TABLET | Refills: 0 | Status: SHIPPED | OUTPATIENT
Start: 2019-02-15 | End: 2019-04-12 | Stop reason: SDUPTHER

## 2019-02-15 RX ORDER — AMITRIPTYLINE HYDROCHLORIDE 25 MG/1
25 TABLET, FILM COATED ORAL NIGHTLY PRN
Qty: 30 TABLET | Refills: 0 | Status: SHIPPED | OUTPATIENT
Start: 2019-02-15 | End: 2019-06-29 | Stop reason: SDUPTHER

## 2019-02-15 RX ORDER — DULOXETIN HYDROCHLORIDE 30 MG/1
30 CAPSULE, DELAYED RELEASE ORAL DAILY
Qty: 30 CAPSULE | Refills: 0 | Status: SHIPPED | OUTPATIENT
Start: 2019-02-15 | End: 2019-03-14 | Stop reason: ALTCHOICE

## 2019-02-15 RX ORDER — MAGNESIUM OXIDE 400 MG/1
TABLET ORAL
Qty: 60 TABLET | Refills: 0 | Status: SHIPPED | OUTPATIENT
Start: 2019-02-15 | End: 2019-06-10 | Stop reason: SDUPTHER

## 2019-02-15 RX ORDER — MORPHINE SULFATE 30 MG/1
30 CAPSULE, EXTENDED RELEASE ORAL DAILY
Qty: 28 CAPSULE | Refills: 0 | Status: SHIPPED | OUTPATIENT
Start: 2019-02-15 | End: 2019-03-15 | Stop reason: SDUPTHER

## 2019-02-15 RX ORDER — HYDROCODONE BITARTRATE AND ACETAMINOPHEN 7.5; 325 MG/1; MG/1
1 TABLET ORAL EVERY 6 HOURS PRN
Qty: 84 TABLET | Refills: 0 | Status: SHIPPED | OUTPATIENT
Start: 2019-02-15 | End: 2019-03-15 | Stop reason: SDUPTHER

## 2019-03-14 ENCOUNTER — APPOINTMENT (OUTPATIENT)
Dept: GENERAL RADIOLOGY | Age: 54
End: 2019-03-14
Payer: COMMERCIAL

## 2019-03-14 ENCOUNTER — HOSPITAL ENCOUNTER (OUTPATIENT)
Age: 54
Setting detail: OBSERVATION
Discharge: HOME | End: 2019-03-15
Attending: EMERGENCY MEDICINE | Admitting: INTERNAL MEDICINE
Payer: COMMERCIAL

## 2019-03-14 DIAGNOSIS — Z95.5 HISTORY OF HEART ARTERY STENT: ICD-10-CM

## 2019-03-14 DIAGNOSIS — I25.119 CORONARY ARTERY DISEASE INVOLVING NATIVE HEART WITH ANGINA PECTORIS, UNSPECIFIED VESSEL OR LESION TYPE (HCC): ICD-10-CM

## 2019-03-14 DIAGNOSIS — R07.9 CHEST PAIN, UNSPECIFIED TYPE: Primary | ICD-10-CM

## 2019-03-14 LAB
A/G RATIO: 1.8 (ref 1.1–2.2)
ALBUMIN SERPL-MCNC: 4.4 G/DL (ref 3.4–5)
ALP BLD-CCNC: 83 U/L (ref 40–129)
ALT SERPL-CCNC: 14 U/L (ref 10–40)
ANION GAP SERPL CALCULATED.3IONS-SCNC: 11 MMOL/L (ref 3–16)
APTT: 21.6 SEC (ref 26–36)
AST SERPL-CCNC: 13 U/L (ref 15–37)
BASOPHILS ABSOLUTE: 0.1 K/UL (ref 0–0.2)
BASOPHILS RELATIVE PERCENT: 0.7 %
BILIRUB SERPL-MCNC: 0.6 MG/DL (ref 0–1)
BUN BLDV-MCNC: 12 MG/DL (ref 7–20)
CALCIUM SERPL-MCNC: 9.5 MG/DL (ref 8.3–10.6)
CHLORIDE BLD-SCNC: 100 MMOL/L (ref 99–110)
CO2: 26 MMOL/L (ref 21–32)
CREAT SERPL-MCNC: 0.7 MG/DL (ref 0.9–1.3)
EKG ATRIAL RATE: 59 BPM
EKG DIAGNOSIS: NORMAL
EKG P AXIS: 17 DEGREES
EKG P-R INTERVAL: 134 MS
EKG Q-T INTERVAL: 414 MS
EKG QRS DURATION: 94 MS
EKG QTC CALCULATION (BAZETT): 409 MS
EKG R AXIS: 72 DEGREES
EKG T AXIS: 40 DEGREES
EKG VENTRICULAR RATE: 59 BPM
EOSINOPHILS ABSOLUTE: 0.3 K/UL (ref 0–0.6)
EOSINOPHILS RELATIVE PERCENT: 3.6 %
GFR AFRICAN AMERICAN: >60
GFR NON-AFRICAN AMERICAN: >60
GLOBULIN: 2.4 G/DL
GLUCOSE BLD-MCNC: 163 MG/DL (ref 70–99)
GLUCOSE BLD-MCNC: 225 MG/DL (ref 70–99)
GLUCOSE BLD-MCNC: 306 MG/DL (ref 70–99)
HCT VFR BLD CALC: 43.6 % (ref 40.5–52.5)
HEMOGLOBIN: 15 G/DL (ref 13.5–17.5)
INR BLD: 0.96 (ref 0.86–1.14)
LYMPHOCYTES ABSOLUTE: 1.9 K/UL (ref 1–5.1)
LYMPHOCYTES RELATIVE PERCENT: 24.6 %
MCH RBC QN AUTO: 31.8 PG (ref 26–34)
MCHC RBC AUTO-ENTMCNC: 34.4 G/DL (ref 31–36)
MCV RBC AUTO: 92.5 FL (ref 80–100)
MONOCYTES ABSOLUTE: 0.6 K/UL (ref 0–1.3)
MONOCYTES RELATIVE PERCENT: 7.4 %
NEUTROPHILS ABSOLUTE: 5 K/UL (ref 1.7–7.7)
NEUTROPHILS RELATIVE PERCENT: 63.7 %
PDW BLD-RTO: 12.8 % (ref 12.4–15.4)
PERFORMED ON: ABNORMAL
PERFORMED ON: ABNORMAL
PLATELET # BLD: 215 K/UL (ref 135–450)
PMV BLD AUTO: 9 FL (ref 5–10.5)
POTASSIUM SERPL-SCNC: 3.9 MMOL/L (ref 3.5–5.1)
PROTHROMBIN TIME: 10.9 SEC (ref 9.8–13)
RBC # BLD: 4.72 M/UL (ref 4.2–5.9)
SODIUM BLD-SCNC: 137 MMOL/L (ref 136–145)
TOTAL PROTEIN: 6.8 G/DL (ref 6.4–8.2)
TROPONIN: <0.01 NG/ML
WBC # BLD: 7.8 K/UL (ref 4–11)

## 2019-03-14 PROCEDURE — 36415 COLL VENOUS BLD VENIPUNCTURE: CPT

## 2019-03-14 PROCEDURE — 6370000000 HC RX 637 (ALT 250 FOR IP): Performed by: INTERNAL MEDICINE

## 2019-03-14 PROCEDURE — 71045 X-RAY EXAM CHEST 1 VIEW: CPT

## 2019-03-14 PROCEDURE — 2580000003 HC RX 258: Performed by: INTERNAL MEDICINE

## 2019-03-14 PROCEDURE — G0378 HOSPITAL OBSERVATION PER HR: HCPCS

## 2019-03-14 PROCEDURE — 93010 ELECTROCARDIOGRAM REPORT: CPT | Performed by: INTERNAL MEDICINE

## 2019-03-14 PROCEDURE — 85610 PROTHROMBIN TIME: CPT

## 2019-03-14 PROCEDURE — 85730 THROMBOPLASTIN TIME PARTIAL: CPT

## 2019-03-14 PROCEDURE — 85025 COMPLETE CBC W/AUTO DIFF WBC: CPT

## 2019-03-14 PROCEDURE — 96372 THER/PROPH/DIAG INJ SC/IM: CPT

## 2019-03-14 PROCEDURE — 83036 HEMOGLOBIN GLYCOSYLATED A1C: CPT

## 2019-03-14 PROCEDURE — 6360000002 HC RX W HCPCS: Performed by: INTERNAL MEDICINE

## 2019-03-14 PROCEDURE — 93005 ELECTROCARDIOGRAM TRACING: CPT | Performed by: EMERGENCY MEDICINE

## 2019-03-14 PROCEDURE — 80053 COMPREHEN METABOLIC PANEL: CPT

## 2019-03-14 PROCEDURE — 84484 ASSAY OF TROPONIN QUANT: CPT

## 2019-03-14 PROCEDURE — 99285 EMERGENCY DEPT VISIT HI MDM: CPT

## 2019-03-14 RX ORDER — ASPIRIN 325 MG
325 TABLET ORAL DAILY
Status: DISCONTINUED | OUTPATIENT
Start: 2019-03-15 | End: 2019-03-15 | Stop reason: HOSPADM

## 2019-03-14 RX ORDER — SODIUM CHLORIDE 0.9 % (FLUSH) 0.9 %
10 SYRINGE (ML) INJECTION EVERY 12 HOURS SCHEDULED
Status: DISCONTINUED | OUTPATIENT
Start: 2019-03-14 | End: 2019-03-15 | Stop reason: HOSPADM

## 2019-03-14 RX ORDER — AMITRIPTYLINE HYDROCHLORIDE 25 MG/1
25 TABLET, FILM COATED ORAL NIGHTLY PRN
Status: DISCONTINUED | OUTPATIENT
Start: 2019-03-14 | End: 2019-03-15 | Stop reason: HOSPADM

## 2019-03-14 RX ORDER — SODIUM CHLORIDE 0.9 % (FLUSH) 0.9 %
10 SYRINGE (ML) INJECTION PRN
Status: DISCONTINUED | OUTPATIENT
Start: 2019-03-14 | End: 2019-03-15 | Stop reason: HOSPADM

## 2019-03-14 RX ORDER — NITROGLYCERIN 0.4 MG/1
0.4 TABLET SUBLINGUAL EVERY 5 MIN PRN
Status: DISCONTINUED | OUTPATIENT
Start: 2019-03-14 | End: 2019-03-15 | Stop reason: HOSPADM

## 2019-03-14 RX ORDER — NICOTINE POLACRILEX 4 MG
15 LOZENGE BUCCAL PRN
Status: DISCONTINUED | OUTPATIENT
Start: 2019-03-14 | End: 2019-03-15 | Stop reason: HOSPADM

## 2019-03-14 RX ORDER — LISINOPRIL 10 MG/1
10 TABLET ORAL DAILY
Status: DISCONTINUED | OUTPATIENT
Start: 2019-03-15 | End: 2019-03-15 | Stop reason: HOSPADM

## 2019-03-14 RX ORDER — ONDANSETRON 2 MG/ML
4 INJECTION INTRAMUSCULAR; INTRAVENOUS EVERY 6 HOURS PRN
Status: DISCONTINUED | OUTPATIENT
Start: 2019-03-14 | End: 2019-03-15 | Stop reason: HOSPADM

## 2019-03-14 RX ORDER — ATORVASTATIN CALCIUM 80 MG/1
80 TABLET, FILM COATED ORAL NIGHTLY
Status: DISCONTINUED | OUTPATIENT
Start: 2019-03-14 | End: 2019-03-15 | Stop reason: HOSPADM

## 2019-03-14 RX ORDER — DOBUTAMINE HYDROCHLORIDE 200 MG/100ML
10 INJECTION INTRAVENOUS CONTINUOUS
Status: DISCONTINUED | OUTPATIENT
Start: 2019-03-14 | End: 2019-03-15 | Stop reason: HOSPADM

## 2019-03-14 RX ORDER — CLOPIDOGREL BISULFATE 75 MG/1
75 TABLET ORAL DAILY
Status: DISCONTINUED | OUTPATIENT
Start: 2019-03-15 | End: 2019-03-15 | Stop reason: HOSPADM

## 2019-03-14 RX ORDER — DEXTROSE MONOHYDRATE 25 G/50ML
12.5 INJECTION, SOLUTION INTRAVENOUS PRN
Status: DISCONTINUED | OUTPATIENT
Start: 2019-03-14 | End: 2019-03-15 | Stop reason: HOSPADM

## 2019-03-14 RX ORDER — DEXTROSE MONOHYDRATE 50 MG/ML
100 INJECTION, SOLUTION INTRAVENOUS PRN
Status: DISCONTINUED | OUTPATIENT
Start: 2019-03-14 | End: 2019-03-15 | Stop reason: HOSPADM

## 2019-03-14 RX ORDER — HYDROCODONE BITARTRATE AND ACETAMINOPHEN 7.5; 325 MG/1; MG/1
1 TABLET ORAL EVERY 6 HOURS PRN
Status: DISCONTINUED | OUTPATIENT
Start: 2019-03-14 | End: 2019-03-15 | Stop reason: HOSPADM

## 2019-03-14 RX ADMIN — Medication 10 ML: at 20:05

## 2019-03-14 RX ADMIN — ENOXAPARIN SODIUM 40 MG: 40 INJECTION SUBCUTANEOUS at 20:05

## 2019-03-14 RX ADMIN — HYDROCODONE BITARTRATE AND ACETAMINOPHEN 1 TABLET: 7.5; 325 TABLET ORAL at 20:04

## 2019-03-14 RX ADMIN — ATORVASTATIN CALCIUM 80 MG: 80 TABLET, FILM COATED ORAL at 20:04

## 2019-03-14 ASSESSMENT — ENCOUNTER SYMPTOMS
SORE THROAT: 0
CONSTIPATION: 0
VOMITING: 0
SHORTNESS OF BREATH: 0
BLOOD IN STOOL: 0
ABDOMINAL PAIN: 0
RHINORRHEA: 0
NAUSEA: 0
DIARRHEA: 0

## 2019-03-14 ASSESSMENT — PAIN SCALES - GENERAL
PAINLEVEL_OUTOF10: 0
PAINLEVEL_OUTOF10: 5

## 2019-03-14 ASSESSMENT — HEART SCORE: ECG: 1

## 2019-03-15 ENCOUNTER — OFFICE VISIT (OUTPATIENT)
Dept: PAIN MANAGEMENT | Age: 54
End: 2019-03-15
Payer: COMMERCIAL

## 2019-03-15 VITALS
OXYGEN SATURATION: 95 % | HEIGHT: 70 IN | DIASTOLIC BLOOD PRESSURE: 79 MMHG | TEMPERATURE: 97.2 F | WEIGHT: 187.8 LBS | RESPIRATION RATE: 16 BRPM | HEART RATE: 65 BPM | SYSTOLIC BLOOD PRESSURE: 139 MMHG | BODY MASS INDEX: 26.88 KG/M2

## 2019-03-15 VITALS
DIASTOLIC BLOOD PRESSURE: 64 MMHG | BODY MASS INDEX: 27.12 KG/M2 | SYSTOLIC BLOOD PRESSURE: 113 MMHG | HEART RATE: 71 BPM | WEIGHT: 189 LBS

## 2019-03-15 DIAGNOSIS — S33.5XXA SPRAIN OF LUMBAR REGION, INITIAL ENCOUNTER: ICD-10-CM

## 2019-03-15 DIAGNOSIS — S33.5XXA SPRAIN OF LOW BACK, INITIAL ENCOUNTER: ICD-10-CM

## 2019-03-15 DIAGNOSIS — M51.27 PROLAPSED LUMBOSACRAL INTERVERTEBRAL DISC: ICD-10-CM

## 2019-03-15 DIAGNOSIS — S33.5XXA LUMBAR SPRAIN, INITIAL ENCOUNTER: ICD-10-CM

## 2019-03-15 DIAGNOSIS — S33.5XXA LOW BACK SPRAIN, INITIAL ENCOUNTER: ICD-10-CM

## 2019-03-15 DIAGNOSIS — G89.4 CHRONIC PAIN SYNDROME: Chronic | ICD-10-CM

## 2019-03-15 PROBLEM — I25.83 CORONARY ARTERY DISEASE DUE TO LIPID RICH PLAQUE: Status: ACTIVE | Noted: 2019-03-15

## 2019-03-15 PROBLEM — I25.10 CORONARY ARTERY DISEASE DUE TO LIPID RICH PLAQUE: Status: ACTIVE | Noted: 2019-03-15

## 2019-03-15 LAB
CHOLESTEROL, TOTAL: 177 MG/DL (ref 0–199)
ESTIMATED AVERAGE GLUCOSE: 203 MG/DL
GLUCOSE BLD-MCNC: 243 MG/DL (ref 70–99)
GLUCOSE BLD-MCNC: 340 MG/DL (ref 70–99)
HBA1C MFR BLD: 8.7 %
HCT VFR BLD CALC: 42.7 % (ref 40.5–52.5)
HDLC SERPL-MCNC: 58 MG/DL (ref 40–60)
HEMOGLOBIN: 14.7 G/DL (ref 13.5–17.5)
LDL CHOLESTEROL CALCULATED: 90 MG/DL
LEFT VENTRICULAR EJECTION FRACTION HIGH VALUE: 60 %
LEFT VENTRICULAR EJECTION FRACTION MODE: NORMAL
LV EF: 49 %
LV EF: 55 %
LV EF: 58 %
LVEF MODALITY: NORMAL
LVEF MODALITY: NORMAL
MCH RBC QN AUTO: 31.7 PG (ref 26–34)
MCHC RBC AUTO-ENTMCNC: 34.4 G/DL (ref 31–36)
MCV RBC AUTO: 92.4 FL (ref 80–100)
PDW BLD-RTO: 12.7 % (ref 12.4–15.4)
PERFORMED ON: ABNORMAL
PERFORMED ON: ABNORMAL
PLATELET # BLD: 198 K/UL (ref 135–450)
PMV BLD AUTO: 8.5 FL (ref 5–10.5)
RBC # BLD: 4.62 M/UL (ref 4.2–5.9)
TRIGL SERPL-MCNC: 143 MG/DL (ref 0–150)
VLDLC SERPL CALC-MCNC: 29 MG/DL
WBC # BLD: 8.7 K/UL (ref 4–11)

## 2019-03-15 PROCEDURE — 3430000000 HC RX DIAGNOSTIC RADIOPHARMACEUTICAL: Performed by: INTERNAL MEDICINE

## 2019-03-15 PROCEDURE — 6370000000 HC RX 637 (ALT 250 FOR IP): Performed by: INTERNAL MEDICINE

## 2019-03-15 PROCEDURE — 93017 CV STRESS TEST TRACING ONLY: CPT | Performed by: INTERNAL MEDICINE

## 2019-03-15 PROCEDURE — 78452 HT MUSCLE IMAGE SPECT MULT: CPT | Performed by: INTERNAL MEDICINE

## 2019-03-15 PROCEDURE — 85027 COMPLETE CBC AUTOMATED: CPT

## 2019-03-15 PROCEDURE — G0378 HOSPITAL OBSERVATION PER HR: HCPCS

## 2019-03-15 PROCEDURE — 99213 OFFICE O/P EST LOW 20 MIN: CPT | Performed by: INTERNAL MEDICINE

## 2019-03-15 PROCEDURE — 80061 LIPID PANEL: CPT

## 2019-03-15 PROCEDURE — C8929 TTE W OR WO FOL WCON,DOPPLER: HCPCS

## 2019-03-15 PROCEDURE — 6360000004 HC RX CONTRAST MEDICATION: Performed by: INTERNAL MEDICINE

## 2019-03-15 PROCEDURE — 36415 COLL VENOUS BLD VENIPUNCTURE: CPT

## 2019-03-15 PROCEDURE — A9502 TC99M TETROFOSMIN: HCPCS | Performed by: INTERNAL MEDICINE

## 2019-03-15 PROCEDURE — 99254 IP/OBS CNSLTJ NEW/EST MOD 60: CPT | Performed by: INTERNAL MEDICINE

## 2019-03-15 RX ORDER — HYDROCODONE BITARTRATE AND ACETAMINOPHEN 7.5; 325 MG/1; MG/1
1 TABLET ORAL EVERY 6 HOURS PRN
Qty: 84 TABLET | Refills: 0 | Status: SHIPPED | OUTPATIENT
Start: 2019-03-15 | End: 2019-04-12 | Stop reason: SDUPTHER

## 2019-03-15 RX ORDER — MORPHINE SULFATE 30 MG/1
30 CAPSULE, EXTENDED RELEASE ORAL DAILY
Qty: 28 CAPSULE | Refills: 0 | Status: SHIPPED | OUTPATIENT
Start: 2019-03-15 | End: 2019-04-12 | Stop reason: SDUPTHER

## 2019-03-15 RX ADMIN — HYDROCODONE BITARTRATE AND ACETAMINOPHEN 1 TABLET: 7.5; 325 TABLET ORAL at 10:51

## 2019-03-15 RX ADMIN — LISINOPRIL 10 MG: 10 TABLET ORAL at 10:48

## 2019-03-15 RX ADMIN — ASPIRIN 325 MG ORAL TABLET 325 MG: 325 PILL ORAL at 10:48

## 2019-03-15 RX ADMIN — TETROFOSMIN 30 MILLICURIE: 0.23 INJECTION, POWDER, LYOPHILIZED, FOR SOLUTION INTRAVENOUS at 09:00

## 2019-03-15 RX ADMIN — PERFLUTREN 1.43 MG: 6.52 INJECTION, SUSPENSION INTRAVENOUS at 08:10

## 2019-03-15 RX ADMIN — CLOPIDOGREL 75 MG: 75 TABLET, FILM COATED ORAL at 10:48

## 2019-03-15 RX ADMIN — TETROFOSMIN 10 MILLICURIE: 0.23 INJECTION, POWDER, LYOPHILIZED, FOR SOLUTION INTRAVENOUS at 07:19

## 2019-03-15 ASSESSMENT — PAIN SCALES - GENERAL: PAINLEVEL_OUTOF10: 6

## 2019-04-12 ENCOUNTER — OFFICE VISIT (OUTPATIENT)
Dept: PAIN MANAGEMENT | Age: 54
End: 2019-04-12
Payer: COMMERCIAL

## 2019-04-12 VITALS
WEIGHT: 185 LBS | SYSTOLIC BLOOD PRESSURE: 132 MMHG | BODY MASS INDEX: 26.54 KG/M2 | DIASTOLIC BLOOD PRESSURE: 73 MMHG | HEART RATE: 73 BPM

## 2019-04-12 DIAGNOSIS — S33.5XXA SPRAIN OF LUMBAR REGION, INITIAL ENCOUNTER: ICD-10-CM

## 2019-04-12 DIAGNOSIS — S33.5XXA LUMBAR SPRAIN, INITIAL ENCOUNTER: ICD-10-CM

## 2019-04-12 DIAGNOSIS — S33.5XXA SPRAIN OF LOW BACK, INITIAL ENCOUNTER: ICD-10-CM

## 2019-04-12 DIAGNOSIS — G89.4 CHRONIC PAIN SYNDROME: Chronic | ICD-10-CM

## 2019-04-12 DIAGNOSIS — M51.27 PROLAPSED LUMBOSACRAL INTERVERTEBRAL DISC: ICD-10-CM

## 2019-04-12 DIAGNOSIS — S33.5XXA LOW BACK SPRAIN, INITIAL ENCOUNTER: ICD-10-CM

## 2019-04-12 PROCEDURE — 99214 OFFICE O/P EST MOD 30 MIN: CPT | Performed by: INTERNAL MEDICINE

## 2019-04-12 RX ORDER — MELOXICAM 15 MG/1
15 TABLET ORAL DAILY PRN
Qty: 30 TABLET | Refills: 0 | Status: SHIPPED | OUTPATIENT
Start: 2019-04-12 | End: 2019-06-29 | Stop reason: SDUPTHER

## 2019-04-12 RX ORDER — MORPHINE SULFATE 30 MG/1
30 CAPSULE, EXTENDED RELEASE ORAL DAILY
Qty: 28 CAPSULE | Refills: 0 | Status: SHIPPED | OUTPATIENT
Start: 2019-04-12 | End: 2019-05-10 | Stop reason: SDUPTHER

## 2019-04-12 RX ORDER — DULOXETIN HYDROCHLORIDE 30 MG/1
30 CAPSULE, DELAYED RELEASE ORAL DAILY
Qty: 30 CAPSULE | Refills: 0 | Status: SHIPPED | OUTPATIENT
Start: 2019-04-12 | End: 2019-06-29 | Stop reason: SDUPTHER

## 2019-04-12 RX ORDER — HYDROCODONE BITARTRATE AND ACETAMINOPHEN 7.5; 325 MG/1; MG/1
1 TABLET ORAL EVERY 6 HOURS PRN
Qty: 84 TABLET | Refills: 0 | Status: SHIPPED | OUTPATIENT
Start: 2019-04-12 | End: 2019-05-10 | Stop reason: SDUPTHER

## 2019-04-12 NOTE — PROGRESS NOTES
Caro Orellana  1965  [id number]    HISTORY OF PRESENT ILLNESS:  Mr. Ngoc Saunders is a 62 y.o. male returns for a follow up visit for multiple medical problems. His current presenting problems are   1. bwc-Prolapsed lumbosacral intervertebral disc    2. BWC Lumbar sprain, initial encounter    3. BWC Sprain of low back, initial encounter    4. Lumbar sprain, initial encounter    5. Sprain of low back, initial encounter    6. Chronic pain syndrome    7. BWC Sprain of lumbar region, initial encounter    8. BWC Low back sprain, initial encounter    . As per information/history obtained from the PADT(patient assessment and documentation tool) - He complains of pain in the lower back with radiation to the buttocks, hips Left, upper leg Left, knees Left, lower leg Left, ankles Left and feet Left He rates the pain 7/10 and describes it as sharp, aching, burning. Pain is made worse by: movement, walking, standing, sitting, bending, lifting. Current treatment regimen has helped relieve about 60% of the pain. He denies side effects from the current pain regimen. Patient reports that since the last follow up visit the physical functioning is unchanged, family/social relationships are unchanged, mood is unchanged and sleep patterns are unchanged, and that the overall functioning is unchanged. Patient denies neurological bowel or bladder. Patient denies misusing/abusing his narcotic pain medications or using any illegal drugs. There are No indicators for possible drug abuse, addiction or diversion problems. Upon obtaining the medical history from Mr. Ngoc Saunders regarding today's office visit for his presenting problems,   complains he is having a lot of work stress. He states he is working 50 + hours per week. He mentions he has been complaint with his medications and is using all of them as before. Patient states his sleep is fair. Has fairly normal sleep latency. Averages about 4-6 hours of sleep a night.  Denies any signs of sleep apnea. Feels somewhat rested in the morning. He says he is using Elavil also. Patient's  subjective report of his mood is fair. he describes occasional symptoms of depression, occasional  irritability and some mood swings. Describes his mood as being neutral and reports some pleasure in his daily activities. Reports  fair  appetite, energy and concentration. Able to function well in different aspects of his daily activities. Denies suicidal or homicidal ideation. Denies any complaints of increased tension, does   Worry sometimes and occasional  irritability  he denies any c/o increased anxiety, No c/o panic attacks or symptoms of PTSD. He reports he has been out of some of his medications. He denies any GERD symptoms. ALLERGIES/PAST MED/FAM/SOC HISTORY: Mr. Iris Mark allergies, past medical, family and social history were reviewed in the chart and also listed below. Social History     Socioeconomic History    Marital status:      Spouse name: Not on file    Number of children: Not on file    Years of education: Not on file    Highest education level: Not on file   Occupational History    Not on file   Social Needs    Financial resource strain: Not on file    Food insecurity:     Worry: Not on file     Inability: Not on file    Transportation needs:     Medical: Not on file     Non-medical: Not on file   Tobacco Use    Smoking status: Former Smoker     Packs/day: 0.50     Years: 15.00     Pack years: 7.50     Types: Cigarettes    Smokeless tobacco: Former User   Substance and Sexual Activity    Alcohol use:  Yes     Alcohol/week: 1.2 oz     Types: 2 Cans of beer per week     Comment: occa    Drug use: No    Sexual activity: Not on file   Lifestyle    Physical activity:     Days per week: Not on file     Minutes per session: Not on file    Stress: Not on file   Relationships    Social connections:     Talks on phone: Not on file     Gets together: Not on file     Attends [employer] pain, diarrhea,constipation. Skin: Negative for color change or any abnormal bruising. Neurological: Negative for speech difficulty, weakness and light-headedness, dizziness, tremors, sleepiness  Psychiatric/Behavioral: Negative for suicidal ideas, hallucinations, behavioral problems, self-injury, decreased concentration/cognition, agitation, confusion. PHYSICAL EXAM:   Nursing note and vitals reviewed. /73   Pulse 73   Wt 185 lb (83.9 kg)   BMI 26.54 kg/m²   General Appearance: Patient is well nourished, well developed, well groomed and in no acute distress. Skin: Skin is warm and dry, good turgor . No rash or lesions noted. He is not diaphoretic. Pulmonary/Chest: Effort normal. No respiratory distress or use of accessory muscles. Auscultation revealing normal air entry. He does not have wheezes in the lung fields. He has no rales. Cardiovascular: Normal rate, regular rhythm, normal heart sounds, and does not have murmur. Exam reveals no gallop and no friction rub. Abdominal: Soft. Bowel sounds are normal.  On inspection of abdomen is flat no distension and no mass. No tenderness. He has no rebound and no guarding. Musculoskeletal / Extremities: Range of motion is normal. Gait is normal, assistive devices use: none. He exhibits edema: none, and no tenderness. Neurological/Psychiatric:He is alert and oriented to person, place, and time. Coordination is  normal.   Judgement and Insight is normal  His mood is Appropriate and affect is Flat/blunted and Anxious . His behavior is normal.   thought content normal.        IMPRESSION:     1. bwc-Prolapsed lumbosacral intervertebral disc    2. BWC Lumbar sprain, initial encounter    3. BWC Sprain of low back, initial encounter    4. Lumbar sprain, initial encounter    5. Sprain of low back, initial encounter    6. Chronic pain syndrome    7. BWC Sprain of lumbar region, initial encounter    8.  BWC Low back sprain, initial encounter PLAN:  Informed verbal consent was obtained. -[facility] record was obtained and reviewed  for the last one year and no indicators of drug misuse  were found. Any other controlled substance prescriptions  seen on the record have been accounted for, I am aware of the patient receiving these medications. Soheila January [facility] record will be rechecked as part of office protocol.    -Most recent labs were reviewed and are within normal limit  -Restart Cymbalta 30 mg daily   -Continue with Mobic and Jackie along with Norco   -he was advised proper sleep hygiene-told to avoid:use of caffeine or other stimulants after noon, alcohol use near bedtime, long or frequent naps during the day, erratic sleep schedule, heavy meals near bedtime, vigorous exercise near bedtime and use of electronic devices near bedtime   -CBT techniques- relaxation therapies such as biofeedback, mindfulness based stress reduction, imagery, cognitive restructuring, problem solving discussed with patient   -He was advised to increase fluids ( 5-7  glasses of fluid daily), limit caffeine, avoid cheese products, increase dietary fiber, increase activity and exercise as tolerated and relax regularly and enjoy meals   -Walking/Strethcing exercises as advised   Mr. Alex Chakraborty will be prescribed  the medications  listed below which are for treatment of his presenting  medical problems which for this visit include:   Diagnoses of bwc-Prolapsed lumbosacral intervertebral disc, BWC Lumbar sprain, initial encounter, BWC Sprain of low back, initial encounter, Lumbar sprain, initial encounter, Sprain of low back, initial encounter, Chronic pain syndrome, BWC Sprain of lumbar region, initial encounter, and BWC Low back sprain, initial encounter were pertinent to this visit. Medications/orders associated with this visit:    Current Outpatient Medications   Medication Sig Dispense Refill    morphine (JACKIE) 30 MG extended release capsule Take 1 capsule by mouth daily for 28 days. 28 capsule 0    HYDROcodone-acetaminophen (NORCO) 7.5-325 MG per tablet Take 1 tablet by mouth every 6 hours as needed for Pain for up to 28 days. MAX 3 PER DAY 84 tablet 0    meloxicam (MOBIC) 15 MG tablet Take 1 tablet by mouth daily as needed for Pain 30 tablet 0    amitriptyline (ELAVIL) 25 MG tablet Take 1 tablet by mouth nightly as needed for Sleep 30 tablet 0    magnesium oxide (MAG-OX) 400 MG tablet Take one tablet Po BID 60 tablet 0    NOVOLOG 100 UNIT/ML injection continuous. Insulin pump averages 50-80 units daily      aspirin 325 MG tablet Take 325 mg by mouth daily.  carvedilol (COREG) 6.25 MG tablet Take 6.25 mg by mouth 2 times daily (with meals).  atorvastatin (LIPITOR) 80 MG tablet Take 80 mg by mouth nightly.  clopidogrel (PLAVIX) 75 MG tablet Take 75 mg by mouth daily.  lisinopril (PRINIVIL;ZESTRIL) 10 MG tablet Take 10 mg by mouth daily. No current facility-administered medications for this visit. Goals of current treatment regimen include improvement in pain, restoration of functioning- with focus on improvement in physical performance, general activity, work or disability,emotional distress, health care utilization and  decreased medication consumption. Will continue to monitor progress towards achieving/maintaining therapeutic goals with special emphasis on  1. Improvement in perceived interfernce  of pain with ADL's. Ability to do home exercises independently. Ability to do household chores indoor and/or outdoor work and social and leisure activities. To increase flexibility/ROM, strength and endurance. Improve psychosocial and physical functioning.- he is showing progression towards this treatment goal with the current regimen. 2. Improving sleep to 6-7 hours a night.  Improve mood/ anxiety and depression symptoms such as crying spells, low energy, problems with concentration, motivation.- he is showing progression towards this treatment goal with the

## 2019-05-10 ENCOUNTER — OFFICE VISIT (OUTPATIENT)
Dept: PAIN MANAGEMENT | Age: 54
End: 2019-05-10
Payer: COMMERCIAL

## 2019-05-10 VITALS
HEIGHT: 70 IN | BODY MASS INDEX: 26.48 KG/M2 | HEART RATE: 73 BPM | SYSTOLIC BLOOD PRESSURE: 148 MMHG | DIASTOLIC BLOOD PRESSURE: 82 MMHG | WEIGHT: 185 LBS

## 2019-05-10 DIAGNOSIS — S33.5XXA SPRAIN OF LOW BACK, INITIAL ENCOUNTER: ICD-10-CM

## 2019-05-10 DIAGNOSIS — S33.5XXA LUMBAR SPRAIN, INITIAL ENCOUNTER: ICD-10-CM

## 2019-05-10 DIAGNOSIS — M51.27 PROLAPSED LUMBOSACRAL INTERVERTEBRAL DISC: ICD-10-CM

## 2019-05-10 DIAGNOSIS — G89.4 CHRONIC PAIN SYNDROME: Chronic | ICD-10-CM

## 2019-05-10 DIAGNOSIS — S33.5XXA SPRAIN OF LUMBAR REGION, INITIAL ENCOUNTER: ICD-10-CM

## 2019-05-10 DIAGNOSIS — S33.5XXA LOW BACK SPRAIN, INITIAL ENCOUNTER: ICD-10-CM

## 2019-05-10 PROCEDURE — 99213 OFFICE O/P EST LOW 20 MIN: CPT | Performed by: INTERNAL MEDICINE

## 2019-05-10 RX ORDER — HYDROCODONE BITARTRATE AND ACETAMINOPHEN 7.5; 325 MG/1; MG/1
1 TABLET ORAL EVERY 6 HOURS PRN
Qty: 90 TABLET | Refills: 0 | Status: SHIPPED | OUTPATIENT
Start: 2019-05-10 | End: 2019-06-10 | Stop reason: SDUPTHER

## 2019-05-10 RX ORDER — MORPHINE SULFATE 30 MG/1
30 CAPSULE, EXTENDED RELEASE ORAL DAILY
Qty: 30 CAPSULE | Refills: 0 | Status: SHIPPED | OUTPATIENT
Start: 2019-05-10 | End: 2019-06-10 | Stop reason: SDUPTHER

## 2019-05-10 NOTE — PROGRESS NOTES
Neeta Casillas  1965  [id number]    HISTORY OF PRESENT ILLNESS:  Mr. Sera Hope is a 62 y.o. male returns for a follow up visit for multiple medical problems. His current presenting problems are   1. Chronic pain syndrome    2. bwc-Prolapsed lumbosacral intervertebral disc    3. BWC Lumbar sprain, initial encounter    4. BWC Sprain of low back, initial encounter    5. Lumbar sprain, initial encounter    6. Sprain of low back, initial encounter    7. BWC Sprain of lumbar region, initial encounter    8. BWC Low back sprain, initial encounter    . As per information/history obtained from the PADT(patient assessment and documentation tool) - He complains of pain in the lower back with radiation to the buttocks, hips Bilateral, upper leg Left and knees Left He rates the pain 7/10 and describes it as sharp, aching, burning. Pain is made worse by: movement, walking, standing, bending, lifting. Current treatment regimen has helped relieve about 50% of the pain. He denies side effects from the current pain regimen. Patient reports that since the last follow up visit the physical functioning is worse, family/social relationships are unchanged, mood is unchanged and sleep patterns are unchanged, and that the overall functioning is unchanged. Patient denies neurological bowel or bladder. Patient denies misusing/abusing his narcotic pain medications or using any illegal drugs. There are some indicators for possible drug abuse, addiction or diversion problems. Upon obtaining the medical history from Mr. Sera Hope regarding today's office visit for his presenting problems,   complains he is having problems with his blood sugar. He states he has been tired and fatigued. He mentions he is working full time still. He says he has been using Elavil along with Cymbalta. ALLERGIES: Patients list of allergies were reviewed     MEDICATIONS: Mr. Sera Hope list of medications were reviewed. His current medications are   Outpatient heart sounds, and does not have murmur. Pulmonary/Chest: Effort normal. No respiratory distress. He does not have wheezes in the lung fields. He has no rales. Neurological/Psychiatric:He is alert and oriented to person, place, and time. Coordination is  normal.  His mood isAppropriate and affect is Flat/blunted and Anxious . IMPRESSION:   1. Chronic pain syndrome    2. bwc-Prolapsed lumbosacral intervertebral disc    3. BWC Lumbar sprain, initial encounter    4. BWC Sprain of low back, initial encounter    5. Lumbar sprain, initial encounter    6. Sprain of low back, initial encounter    7. BWC Sprain of lumbar region, initial encounter    8. BWC Low back sprain, initial encounter        PLAN:  Informed verbal consent was obtained  -Patient's urine drug screen results with GC/MS confirmation were obtained and reviewed and were negative for any illicit drugs. Prescribed medication Hydrocodone was negative. States he was having increase pain andd took a few extra pills, and ran out 2 days early   -Chronic opioid treatment protocol was discussed with the patient again including taking medications only as prescribed , using one pharmacy and getting all controlled substances from one physician unless okayed with me. Proper safeguarding and disposal of medications were also discussed with the patient.    -Discussed use, benefit, and side effects of prescribed medications. Barriers to medication compliance addressed. All patient questions answered. Pt voiced understanding.    -Will monitor closely  -May call for pil count or repeat UDS    Current Outpatient Medications   Medication Sig Dispense Refill    morphine (LAURA) 30 MG extended release capsule Take 1 capsule by mouth daily for 28 days. 28 capsule 0    HYDROcodone-acetaminophen (NORCO) 7.5-325 MG per tablet Take 1 tablet by mouth every 6 hours as needed for Pain for up to 28 days.  MAX 3 PER DAY 84 tablet 0    meloxicam (MOBIC) 15 MG tablet Take 1 tablet by mouth daily as needed for Pain 30 tablet 0    DULoxetine (CYMBALTA) 30 MG extended release capsule Take 1 capsule by mouth daily 30 capsule 0    amitriptyline (ELAVIL) 25 MG tablet Take 1 tablet by mouth nightly as needed for Sleep 30 tablet 0    magnesium oxide (MAG-OX) 400 MG tablet Take one tablet Po BID 60 tablet 0    NOVOLOG 100 UNIT/ML injection continuous. Insulin pump averages 50-80 units daily      aspirin 325 MG tablet Take 325 mg by mouth daily.  carvedilol (COREG) 6.25 MG tablet Take 6.25 mg by mouth 2 times daily (with meals).  atorvastatin (LIPITOR) 80 MG tablet Take 80 mg by mouth nightly.  clopidogrel (PLAVIX) 75 MG tablet Take 75 mg by mouth daily.  lisinopril (PRINIVIL;ZESTRIL) 10 MG tablet Take 10 mg by mouth daily. No current facility-administered medications for this visit. I will continue his current medication regimen  which is part of the above treatment schedule. It has been helping with Mr. Esmer Hurley chronic  medical problems which for this visit include:   Diagnoses of Chronic pain syndrome, bwc-Prolapsed lumbosacral intervertebral disc, BWC Lumbar sprain, initial encounter, BWC Sprain of low back, initial encounter, Lumbar sprain, initial encounter, Sprain of low back, initial encounter, BWC Sprain of lumbar region, initial encounter, and BWC Low back sprain, initial encounter were pertinent to this visit. Risks and benefits of the medications and other alternative treatments  including no treatment were discussed with the patient. The common side effects of these medications were also explained to the patient. Informed verbal consent was obtained. Goals of current treatment regimen include improvement in pain, restoration of functioning- with focus on improvement in physical performance, general activity, work or disability,emotional distress, health care utilization and  decreased medication consumption.  Will continue to monitor progress towards achieving/maintaining therapeutic goals with special emphasis on  1. Improvement in perceived interfernce  of pain with ADL's. Ability to do home exercises independently. Ability to do household chores indoor and/or outdoor work and social and leisure activities. Improve psychosocial and physical functioning. - he is showing progression towards this treatment goal with the current regimen. 2.Ability to focus/concentrate at work and perform the duties required of him at work  Sit through a workday without lower extremity symptoms. Stand 30-60 minutes without lower extremity symptoms. Ability to lift up to 10-20 lbs. Ability to go up and down stairs. Sit 30-60 minutes  Without having to stand up frequently. - he is maintaining/progressing towards his work related goals with the current regimen. He was advised against drinking alcohol with the narcotic pain medicines, advised against driving or handling machinery while adjusting the dose of medicines or if having cognitive  issues related to the current medications. Risk of overdose and death, if medicines not taken as prescribed, were also discussed. If the patient develops new symptoms or if the symptoms worsen, the patient should call the office. While transcribing every attempt was made to maintain the accuracy of the note in terms of it's contents,there may have been some errors made inadvertently. Thank you for allowing me to participate in the care of this patient.     Jonathan Calle MD.    Cc: Valerie Suazo MD   I, Marek Sweet, am scribing for and in the presence of Dr. Jonathan Calle.   05/10/19  3:16 PM  JIMMY Patricia, Dr. Jonathan Calle, personally performed the services described in this documentation as scribed by   Marek Sweet MA in my presence and it is both accurate and complete

## 2019-06-10 ENCOUNTER — OFFICE VISIT (OUTPATIENT)
Dept: PAIN MANAGEMENT | Age: 54
End: 2019-06-10
Payer: COMMERCIAL

## 2019-06-10 VITALS
BODY MASS INDEX: 26.54 KG/M2 | DIASTOLIC BLOOD PRESSURE: 84 MMHG | SYSTOLIC BLOOD PRESSURE: 132 MMHG | HEART RATE: 64 BPM | WEIGHT: 185 LBS

## 2019-06-10 DIAGNOSIS — S33.5XXA SPRAIN OF LUMBAR REGION, INITIAL ENCOUNTER: ICD-10-CM

## 2019-06-10 DIAGNOSIS — G89.4 CHRONIC PAIN SYNDROME: Chronic | ICD-10-CM

## 2019-06-10 DIAGNOSIS — S33.5XXA LUMBAR SPRAIN, INITIAL ENCOUNTER: ICD-10-CM

## 2019-06-10 DIAGNOSIS — M51.27 PROLAPSED LUMBOSACRAL INTERVERTEBRAL DISC: ICD-10-CM

## 2019-06-10 DIAGNOSIS — S33.5XXA SPRAIN OF LOW BACK, INITIAL ENCOUNTER: ICD-10-CM

## 2019-06-10 DIAGNOSIS — S33.5XXA LOW BACK SPRAIN, INITIAL ENCOUNTER: ICD-10-CM

## 2019-06-10 PROCEDURE — 99214 OFFICE O/P EST MOD 30 MIN: CPT | Performed by: INTERNAL MEDICINE

## 2019-06-10 RX ORDER — MORPHINE SULFATE 30 MG/1
30 CAPSULE, EXTENDED RELEASE ORAL DAILY
Qty: 28 CAPSULE | Refills: 0 | Status: SHIPPED | OUTPATIENT
Start: 2019-06-10 | End: 2019-06-29 | Stop reason: SDUPTHER

## 2019-06-10 RX ORDER — HYDROCODONE BITARTRATE AND ACETAMINOPHEN 7.5; 325 MG/1; MG/1
1 TABLET ORAL EVERY 6 HOURS PRN
Qty: 84 TABLET | Refills: 0 | Status: SHIPPED | OUTPATIENT
Start: 2019-06-10 | End: 2019-06-29 | Stop reason: SDUPTHER

## 2019-06-10 RX ORDER — MAGNESIUM OXIDE 400 MG/1
TABLET ORAL
Qty: 60 TABLET | Refills: 0 | Status: SHIPPED | OUTPATIENT
Start: 2019-06-10 | End: 2019-06-29 | Stop reason: SDUPTHER

## 2019-06-10 NOTE — PROGRESS NOTES
Gauri Trimble  1965  [id number]    HISTORY OF PRESENT ILLNESS:  Mr. Rosangela Moon is a 62 y.o. male returns for a follow up visit for multiple medical problems. His current presenting problems are   1. bwc-Prolapsed lumbosacral intervertebral disc    2. BWC Lumbar sprain, initial encounter    3. BWC Sprain of low back, initial encounter    4. Lumbar sprain, initial encounter    5. Sprain of low back, initial encounter    6. Chronic pain syndrome    7. BWC Sprain of lumbar region, initial encounter    8. BWC Low back sprain, initial encounter    . As per information/history obtained from the PADT(patient assessment and documentation tool) - He complains of pain in the lower back with radiation to the buttocks, hips Bilateral, upper leg Bilateral, knees Bilateral, lower leg Bilateral, ankles Bilateral and feet Bilateral He rates the pain 6/10 and describes it as sharp, aching, burning. Pain is made worse by: movement, walking, standing, bending, lifting. Current treatment regimen has helped relieve about 60% of the pain. He denies side effects from the current pain regimen. Patient reports that since the last follow up visit the physical functioning is worse, family/social relationships are unchanged, mood is unchanged and sleep patterns are unchanged, and that the overall functioning is unchanged. Patient denies neurological bowel or bladder. Patient denies misusing/abusing his narcotic pain medications or using any illegal drugs. There are No indicators for possible drug abuse, addiction or diversion problems. Upon obtaining the medical history from Mr. Rosangela Moon regarding today's office visit for his presenting problems,   reports he has been doing fair. He complains he is having some GI issues today after lunch. He says the pain has been baseline the last 3 weeks since the last visit was terrible. He mentions his leg pain is worse than his back, goes to both legs. He states he is working full time still.  He Relationships    Social connections:     Talks on phone: Not on file     Gets together: Not on file     Attends Religion service: Not on file     Active member of club or organization: Not on file     Attends meetings of clubs or organizations: Not on file     Relationship status: Not on file    Intimate partner violence:     Fear of current or ex partner: Not on file     Emotionally abused: Not on file     Physically abused: Not on file     Forced sexual activity: Not on file   Other Topics Concern    Not on file   Social History Narrative    Not on file       Mr. Mami Thompson current medications are   Outpatient Medications Prior to Visit   Medication Sig Dispense Refill    meloxicam (MOBIC) 15 MG tablet Take 1 tablet by mouth daily as needed for Pain 30 tablet 0    DULoxetine (CYMBALTA) 30 MG extended release capsule Take 1 capsule by mouth daily 30 capsule 0    amitriptyline (ELAVIL) 25 MG tablet Take 1 tablet by mouth nightly as needed for Sleep 30 tablet 0    magnesium oxide (MAG-OX) 400 MG tablet Take one tablet Po BID 60 tablet 0    NOVOLOG 100 UNIT/ML injection continuous. Insulin pump averages 50-80 units daily      aspirin 325 MG tablet Take 325 mg by mouth daily.  carvedilol (COREG) 6.25 MG tablet Take 6.25 mg by mouth 2 times daily (with meals).  atorvastatin (LIPITOR) 80 MG tablet Take 80 mg by mouth nightly.  clopidogrel (PLAVIX) 75 MG tablet Take 75 mg by mouth daily.  lisinopril (PRINIVIL;ZESTRIL) 10 MG tablet Take 10 mg by mouth daily. No facility-administered medications prior to visit. REVIEW OF SYSTEMS:   Constitutional: Negative for fatigue and unexpected weight change. Eyes: Negative for visual disturbance. Respiratory: Negative for shortness of breath. Cardiovascular: Negative for chest pain, palpitations  Gastrointestinal: Negative for blood in stool, abdominal distention, nausea, vomiting, abdominal pain, diarrhea,constipation.   Skin: Negative for color change or any abnormal bruising. Neurological: Negative for speech difficulty, weakness and light-headedness, dizziness, tremors, sleepiness  Psychiatric/Behavioral: Negative for suicidal ideas, hallucinations, behavioral problems, self-injury, decreased concentration/cognition, agitation, confusion. PHYSICAL EXAM:   Nursing note and vitals reviewed. /84   Pulse 64   Wt 185 lb (83.9 kg)   BMI 26.54 kg/m²   General Appearance: Patient is well nourished, well developed, well groomed and in no acute distress. Skin: Skin is warm and dry, good turgor . No rash or lesions noted. He is not diaphoretic. Pulmonary/Chest: Effort normal. No respiratory distress or use of accessory muscles. Auscultation revealing normal air entry. He does not have wheezes in the lung fields. He has no rales. Cardiovascular: Normal rate, regular rhythm, normal heart sounds, and does not have murmur. Exam reveals no gallop and no friction rub. Abdominal: Soft. Bowel sounds are normal.  On inspection of abdomen is flat no distension and no mass. No tenderness. He has no rebound and no guarding. Musculoskeletal / Extremities: Range of motion is normal. Gait is normal, assistive devices use: none. He exhibits edema: none, and no tenderness. Neurological/Psychiatric:He is alert and oriented to person, place, and time. Coordination is  normal.   Judgement and Insight is normal  His mood is Appropriate and affect is Flat/blunted and Anxious . His behavior is normal.   thought content normal.        IMPRESSION:     1. bwc-Prolapsed lumbosacral intervertebral disc    2. BWC Lumbar sprain, initial encounter    3. BWC Sprain of low back, initial encounter    4. Lumbar sprain, initial encounter    5. Sprain of low back, initial encounter    6. Chronic pain syndrome    7. BWC Sprain of lumbar region, initial encounter    8.  BWC Low back sprain, initial encounter        PLAN:  Informed verbal consent was obtained.  -Continue with current opioid regimen   -Adv Biofeedback, relaxation and meditation techniques. Referral to psychologist for CBT was also discussed with patient   -He was advised to increase fluids ( 5-7  glasses of fluid daily), limit caffeine, avoid cheese products, increase dietary fiber, increase activity and exercise as tolerated and relax regularly and enjoy meals   -he was advised proper sleep hygiene-told to avoid:use of caffeine or other stimulants after noon, alcohol use near bedtime, long or frequent naps during the day, erratic sleep schedule, heavy meals near bedtime, vigorous exercise near bedtime and use of electronic devices near bedtime   -Continue with Elavil   -Continue with Jackie with Norco 3 per day   -Advised caffeine reduction, dietary changes, elevate head end of bed, NPO after supper, if using alcohol advised reduction of alcohol intake, tobacco cessation if smoking, weight loss   -Continue with Mobic 15 mg   -Back stretching exercises as advised   Mr. Luke Carballo will be prescribed  the medications  listed below which are for treatment of his presenting  medical problems which for this visit include:   Diagnoses of bwc-Prolapsed lumbosacral intervertebral disc, BWC Lumbar sprain, initial encounter, BWC Sprain of low back, initial encounter, Lumbar sprain, initial encounter, Sprain of low back, initial encounter, Chronic pain syndrome, BWC Sprain of lumbar region, initial encounter, and BWC Low back sprain, initial encounter were pertinent to this visit.   Medications/orders associated with this visit:    Current Outpatient Medications   Medication Sig Dispense Refill    meloxicam (MOBIC) 15 MG tablet Take 1 tablet by mouth daily as needed for Pain 30 tablet 0    DULoxetine (CYMBALTA) 30 MG extended release capsule Take 1 capsule by mouth daily 30 capsule 0    amitriptyline (ELAVIL) 25 MG tablet Take 1 tablet by mouth nightly as needed for Sleep 30 tablet 0    magnesium oxide up to 10-20 lbs. Ability to go up and down stairs. Sit 30-60 minutes  Without having to stand up frequently. - he is maintaining/progressing towards his work related goals with the current regimen. Risks and benefits of the medications and other alternative treatments have been/were  discussed with the patient. Any questions on the  common side effects of these medications were also answered. He was advised against drinking alcohol with the narcotic pain medicines, advised against driving or handling machinery when  starting or adjusting the dose of medicines, feeling groggy or drowsy, or if having any cognitive issues related to the current medications. Heis fully aware of the risk of overdose and death, if medicines are misused and not taken as prescribed. If he develops new symptoms or if the symptoms worsen, he was told to call the office. .  Thank you for allowing me to participate in the care of this patient.     April Rosado MD.    Cc: MD JYOTI Parks Gust Catching, am scribing for and in the presence of Dr. April Rosado.   06/10/19  5:35 PM  JIMMY Avendaño, Dr. April Rosado, personally performed the services described in this documentation as scribed by   Shay Álvarez MA in my presence and it is both accurate and complete

## 2019-06-29 ENCOUNTER — OFFICE VISIT (OUTPATIENT)
Dept: PAIN MANAGEMENT | Age: 54
End: 2019-06-29
Payer: COMMERCIAL

## 2019-06-29 VITALS
HEART RATE: 81 BPM | SYSTOLIC BLOOD PRESSURE: 152 MMHG | BODY MASS INDEX: 26.54 KG/M2 | WEIGHT: 185 LBS | DIASTOLIC BLOOD PRESSURE: 90 MMHG

## 2019-06-29 DIAGNOSIS — S33.5XXA SPRAIN OF LOW BACK, INITIAL ENCOUNTER: ICD-10-CM

## 2019-06-29 DIAGNOSIS — M51.27 PROLAPSED LUMBOSACRAL INTERVERTEBRAL DISC: ICD-10-CM

## 2019-06-29 DIAGNOSIS — S33.5XXA LOW BACK SPRAIN, INITIAL ENCOUNTER: ICD-10-CM

## 2019-06-29 DIAGNOSIS — S33.5XXA SPRAIN OF LUMBAR REGION, INITIAL ENCOUNTER: ICD-10-CM

## 2019-06-29 DIAGNOSIS — S33.5XXA LUMBAR SPRAIN, INITIAL ENCOUNTER: ICD-10-CM

## 2019-06-29 DIAGNOSIS — G89.4 CHRONIC PAIN SYNDROME: Chronic | ICD-10-CM

## 2019-06-29 PROCEDURE — 99213 OFFICE O/P EST LOW 20 MIN: CPT | Performed by: INTERNAL MEDICINE

## 2019-06-29 RX ORDER — HYDROCODONE BITARTRATE AND ACETAMINOPHEN 7.5; 325 MG/1; MG/1
1 TABLET ORAL EVERY 6 HOURS PRN
Qty: 90 TABLET | Refills: 0 | Status: SHIPPED | OUTPATIENT
Start: 2019-06-29 | End: 2019-08-08 | Stop reason: SDUPTHER

## 2019-06-29 RX ORDER — DULOXETIN HYDROCHLORIDE 30 MG/1
30 CAPSULE, DELAYED RELEASE ORAL DAILY
Qty: 30 CAPSULE | Refills: 0 | Status: SHIPPED | OUTPATIENT
Start: 2019-06-29 | End: 2019-08-08 | Stop reason: SDUPTHER

## 2019-06-29 RX ORDER — MELOXICAM 15 MG/1
15 TABLET ORAL DAILY PRN
Qty: 30 TABLET | Refills: 0 | Status: SHIPPED | OUTPATIENT
Start: 2019-06-29 | End: 2019-08-08 | Stop reason: SDUPTHER

## 2019-06-29 RX ORDER — MORPHINE SULFATE 30 MG/1
30 CAPSULE, EXTENDED RELEASE ORAL DAILY
Qty: 30 CAPSULE | Refills: 0 | Status: SHIPPED | OUTPATIENT
Start: 2019-06-29 | End: 2019-08-08 | Stop reason: SDUPTHER

## 2019-06-29 RX ORDER — AMITRIPTYLINE HYDROCHLORIDE 25 MG/1
25 TABLET, FILM COATED ORAL NIGHTLY PRN
Qty: 30 TABLET | Refills: 0 | Status: SHIPPED | OUTPATIENT
Start: 2019-06-29 | End: 2019-08-08 | Stop reason: SDUPTHER

## 2019-06-29 RX ORDER — MAGNESIUM OXIDE 400 MG/1
TABLET ORAL
Qty: 60 TABLET | Refills: 0 | Status: SHIPPED | OUTPATIENT
Start: 2019-06-29 | End: 2019-08-08 | Stop reason: SDUPTHER

## 2019-06-29 NOTE — PROGRESS NOTES
MEDICATIONS: Mr. Kristina Cornejo list of medications were reviewed. His current medications are   Outpatient Medications Prior to Visit   Medication Sig Dispense Refill    magnesium oxide (MAG-OX) 400 MG tablet Take one tablet Po BID 60 tablet 0    morphine (LAURA) 30 MG extended release capsule Take 1 capsule by mouth daily for 28 days. 28 capsule 0    HYDROcodone-acetaminophen (NORCO) 7.5-325 MG per tablet Take 1 tablet by mouth every 6 hours as needed for Pain for up to 28 days. MAX 3 PER DAY 84 tablet 0    meloxicam (MOBIC) 15 MG tablet Take 1 tablet by mouth daily as needed for Pain 30 tablet 0    DULoxetine (CYMBALTA) 30 MG extended release capsule Take 1 capsule by mouth daily 30 capsule 0    amitriptyline (ELAVIL) 25 MG tablet Take 1 tablet by mouth nightly as needed for Sleep 30 tablet 0    NOVOLOG 100 UNIT/ML injection continuous. Insulin pump averages 50-80 units daily      aspirin 325 MG tablet Take 325 mg by mouth daily.  carvedilol (COREG) 6.25 MG tablet Take 6.25 mg by mouth 2 times daily (with meals).  atorvastatin (LIPITOR) 80 MG tablet Take 80 mg by mouth nightly.  clopidogrel (PLAVIX) 75 MG tablet Take 75 mg by mouth daily.  lisinopril (PRINIVIL;ZESTRIL) 10 MG tablet Take 10 mg by mouth daily. No facility-administered medications prior to visit. SOCIAL/FAMILY/PAST MEDICAL HISTORY: Mr. Boris Acharya, family and past medical history was reviewed. REVIEW OF SYSTEMS:    Respiratory: Negative for apnea, chest tightness and shortness of breath or change in baseline breathing. Gastrointestinal: Negative for nausea, vomiting, abdominal pain, diarrhea, constipation, blood in stool and abdominal distention. PHYSICAL EXAM:   Nursing note and vitals reviewed. BP (!) 152/90   Pulse 81   Wt 185 lb (83.9 kg)   BMI 26.54 kg/m²   Constitutional: He appears well-developed and well-nourished. No acute distress. Skin: Skin is warm and dry, good turgor. No rash noted. He is not diaphoretic. Cardiovascular: Normal rate, regular rhythm, normal heart sounds, and does not have murmur. Pulmonary/Chest: Effort normal. No respiratory distress. He does not have wheezes in the lung fields. He has no rales. Neurological/Psychiatric:He is alert and oriented to person, place, and time. Coordination is  normal.  His mood isAppropriate and affect is Neutral/Euthymic(normal) . IMPRESSION:   1. bwc-Prolapsed lumbosacral intervertebral disc    2. BWC Lumbar sprain, initial encounter    3. BWC Sprain of low back, initial encounter    4. Lumbar sprain, initial encounter    5. Sprain of low back, initial encounter    6. Chronic pain syndrome    7. BWC Sprain of lumbar region, initial encounter    8. BWC Low back sprain, initial encounter        PLAN:  Informed verbal consent was obtained  -continue with current opioid regimen   -Adv Biofeedback, relaxation and meditation techniques. Referral to psychologist for CBT was also discussed with patient   -Walking as tolerated 20-30 minutes daily   -He was advised to increase fluids ( 5-7  glasses of fluid daily), limit caffeine, avoid cheese products, increase dietary fiber, increase activity and exercise as tolerated and relax regularly and enjoy meals     Current Outpatient Medications   Medication Sig Dispense Refill    magnesium oxide (MAG-OX) 400 MG tablet Take one tablet Po BID 60 tablet 0    morphine (LAURA) 30 MG extended release capsule Take 1 capsule by mouth daily for 28 days. 28 capsule 0    HYDROcodone-acetaminophen (NORCO) 7.5-325 MG per tablet Take 1 tablet by mouth every 6 hours as needed for Pain for up to 28 days.  MAX 3 PER DAY 84 tablet 0    meloxicam (MOBIC) 15 MG tablet Take 1 tablet by mouth daily as needed for Pain 30 tablet 0    DULoxetine (CYMBALTA) 30 MG extended release capsule Take 1 capsule by mouth daily 30 capsule 0    amitriptyline (ELAVIL) 25 MG tablet Take 1 tablet by mouth nightly as needed for Sleep 30 tablet 0    NOVOLOG 100 UNIT/ML injection continuous. Insulin pump averages 50-80 units daily      aspirin 325 MG tablet Take 325 mg by mouth daily.  carvedilol (COREG) 6.25 MG tablet Take 6.25 mg by mouth 2 times daily (with meals).  atorvastatin (LIPITOR) 80 MG tablet Take 80 mg by mouth nightly.  clopidogrel (PLAVIX) 75 MG tablet Take 75 mg by mouth daily.  lisinopril (PRINIVIL;ZESTRIL) 10 MG tablet Take 10 mg by mouth daily. No current facility-administered medications for this visit. I will continue his current medication regimen  which is part of the above treatment schedule. It has been helping with Mr. Alice Conte chronic  medical problems which for this visit include:   Diagnoses of bwc-Prolapsed lumbosacral intervertebral disc, BWC Lumbar sprain, initial encounter, BWC Sprain of low back, initial encounter, Lumbar sprain, initial encounter, Sprain of low back, initial encounter, Chronic pain syndrome, BWC Sprain of lumbar region, initial encounter, and BWC Low back sprain, initial encounter were pertinent to this visit. Risks and benefits of the medications and other alternative treatments  including no treatment were discussed with the patient. The common side effects of these medications were also explained to the patient. Informed verbal consent was obtained. Goals of current treatment regimen include improvement in pain, restoration of functioning- with focus on improvement in physical performance, general activity, work or disability,emotional distress, health care utilization and  decreased medication consumption. Will continue to monitor progress towards achieving/maintaining therapeutic goals with special emphasis on  1. Improvement in perceived interfernce  of pain with ADL's. Ability to do home exercises independently. Ability to do household chores indoor and/or outdoor work and social and leisure activities. Improve psychosocial and physical functioning. - he is showing progression towards this treatment goal with the current regimen. He was advised against drinking alcohol with the narcotic pain medicines, advised against driving or handling machinery while adjusting the dose of medicines or if having cognitive  issues related to the current medications. Risk of overdose and death, if medicines not taken as prescribed, were also discussed. If the patient develops new symptoms or if the symptoms worsen, the patient should call the office. While transcribing every attempt was made to maintain the accuracy of the note in terms of it's contents,there may have been some errors made inadvertently. Thank you for allowing me to participate in the care of this patient. Axel Crespo MD.    Cc: MD Hugo Perez, scribing for in the presence  of Dr. Axel Crespo.   06/29/19  12:08 PM  Claire Sood.  David Hallman Assistant    I, Dr. Axel Crespo, personally performed the services described in this documentation as scribed by  Naye Hawk MA in my presence and it is both accurate and complete

## 2019-08-08 ENCOUNTER — OFFICE VISIT (OUTPATIENT)
Dept: PAIN MANAGEMENT | Age: 54
End: 2019-08-08
Payer: COMMERCIAL

## 2019-08-08 VITALS
WEIGHT: 185 LBS | BODY MASS INDEX: 26.54 KG/M2 | DIASTOLIC BLOOD PRESSURE: 80 MMHG | HEART RATE: 68 BPM | SYSTOLIC BLOOD PRESSURE: 158 MMHG

## 2019-08-08 DIAGNOSIS — S33.5XXA SPRAIN OF LOW BACK, INITIAL ENCOUNTER: ICD-10-CM

## 2019-08-08 DIAGNOSIS — S33.5XXA LOW BACK SPRAIN, INITIAL ENCOUNTER: ICD-10-CM

## 2019-08-08 DIAGNOSIS — G89.4 CHRONIC PAIN SYNDROME: Chronic | ICD-10-CM

## 2019-08-08 DIAGNOSIS — S33.5XXA LUMBAR SPRAIN, INITIAL ENCOUNTER: ICD-10-CM

## 2019-08-08 DIAGNOSIS — M51.27 PROLAPSED LUMBOSACRAL INTERVERTEBRAL DISC: ICD-10-CM

## 2019-08-08 DIAGNOSIS — S33.5XXA SPRAIN OF LUMBAR REGION, INITIAL ENCOUNTER: ICD-10-CM

## 2019-08-08 PROCEDURE — 99214 OFFICE O/P EST MOD 30 MIN: CPT | Performed by: INTERNAL MEDICINE

## 2019-08-08 RX ORDER — DULOXETIN HYDROCHLORIDE 30 MG/1
30 CAPSULE, DELAYED RELEASE ORAL DAILY
Qty: 30 CAPSULE | Refills: 0 | Status: SHIPPED | OUTPATIENT
Start: 2019-08-08 | End: 2019-11-07 | Stop reason: SDUPTHER

## 2019-08-08 RX ORDER — HYDROCODONE BITARTRATE AND ACETAMINOPHEN 7.5; 325 MG/1; MG/1
1 TABLET ORAL EVERY 6 HOURS PRN
Qty: 84 TABLET | Refills: 0 | Status: SHIPPED | OUTPATIENT
Start: 2019-08-08 | End: 2019-09-06 | Stop reason: SDUPTHER

## 2019-08-08 RX ORDER — MORPHINE SULFATE 30 MG/1
30 CAPSULE, EXTENDED RELEASE ORAL DAILY
Qty: 28 CAPSULE | Refills: 0 | Status: SHIPPED | OUTPATIENT
Start: 2019-08-08 | End: 2019-09-06 | Stop reason: SDUPTHER

## 2019-08-08 RX ORDER — AMITRIPTYLINE HYDROCHLORIDE 25 MG/1
25 TABLET, FILM COATED ORAL NIGHTLY PRN
Qty: 30 TABLET | Refills: 0 | Status: SHIPPED | OUTPATIENT
Start: 2019-08-08 | End: 2019-09-06 | Stop reason: SDUPTHER

## 2019-08-08 RX ORDER — MELOXICAM 15 MG/1
15 TABLET ORAL DAILY PRN
Qty: 30 TABLET | Refills: 0 | Status: SHIPPED | OUTPATIENT
Start: 2019-08-08 | End: 2019-11-07 | Stop reason: SDUPTHER

## 2019-08-08 RX ORDER — MAGNESIUM OXIDE 400 MG/1
TABLET ORAL
Qty: 60 TABLET | Refills: 0 | Status: SHIPPED | OUTPATIENT
Start: 2019-08-08 | End: 2019-11-07 | Stop reason: SDUPTHER

## 2019-08-08 NOTE — PROGRESS NOTES
file     Gets together: Not on file     Attends [employer] service: Not on file     Active member of club or organization: Not on file     Attends meetings of clubs or organizations: Not on file     Relationship status: Not on file    Intimate partner violence:     Fear of current or ex partner: Not on file     Emotionally abused: Not on file     Physically abused: Not on file     Forced sexual activity: Not on file   Other Topics Concern    Not on file   Social History Narrative    Not on file       Mr. Janee Torers current medications are   Outpatient Medications Prior to Visit   Medication Sig Dispense Refill    magnesium oxide (MAG-OX) 400 MG tablet Take one tablet Po BID 60 tablet 0    meloxicam (MOBIC) 15 MG tablet Take 1 tablet by mouth daily as needed for Pain 30 tablet 0    DULoxetine (CYMBALTA) 30 MG extended release capsule Take 1 capsule by mouth daily 30 capsule 0    amitriptyline (ELAVIL) 25 MG tablet Take 1 tablet by mouth nightly as needed for Sleep 30 tablet 0    NOVOLOG 100 UNIT/ML injection continuous. Insulin pump averages 50-80 units daily      aspirin 325 MG tablet Take 325 mg by mouth daily.  carvedilol (COREG) 6.25 MG tablet Take 6.25 mg by mouth 2 times daily (with meals).  atorvastatin (LIPITOR) 80 MG tablet Take 80 mg by mouth nightly.  clopidogrel (PLAVIX) 75 MG tablet Take 75 mg by mouth daily.  lisinopril (PRINIVIL;ZESTRIL) 10 MG tablet Take 10 mg by mouth daily. No facility-administered medications prior to visit. REVIEW OF SYSTEMS:   Constitutional: Negative for fatigue and unexpected weight change. Eyes: Negative for visual disturbance. Respiratory: Negative for shortness of breath. Cardiovascular: Negative for chest pain, palpitations  Gastrointestinal: Negative for blood in stool, abdominal distention, nausea, vomiting, abdominal pain, diarrhea,constipation. Skin: Negative for color change or any abnormal bruising.    Neurological: Negative him at work  Sit through a workday without lower extremity symptoms. Stand 30-60 minutes without lower extremity symptoms. Ability to lift up to 10-20 lbs. Ability to go up and down stairs. Sit 30-60 minutes  Without having to stand up frequently. - he is maintaining/progressing towards his work related goals with the current regimen. Risks and benefits of the medications and other alternative treatments have been/were  discussed with the patient. Any questions on the  common side effects of these medications were also answered. He was advised against drinking alcohol with the narcotic pain medicines, advised against driving or handling machinery when  starting or adjusting the dose of medicines, feeling groggy or drowsy, or if having any cognitive issues related to the current medications. Heis fully aware of the risk of overdose and death, if medicines are misused and not taken as prescribed. If he develops new symptoms or if the symptoms worsen, he was told to call the office. .  Thank you for allowing me to participate in the care of this patient.     Ana Luciano MD.    Cc: Dina Borja MD

## 2019-09-06 ENCOUNTER — OFFICE VISIT (OUTPATIENT)
Dept: PAIN MANAGEMENT | Age: 54
End: 2019-09-06
Payer: COMMERCIAL

## 2019-09-06 VITALS
WEIGHT: 200 LBS | DIASTOLIC BLOOD PRESSURE: 85 MMHG | SYSTOLIC BLOOD PRESSURE: 138 MMHG | BODY MASS INDEX: 28.7 KG/M2 | HEART RATE: 64 BPM

## 2019-09-06 DIAGNOSIS — S33.5XXA LUMBAR SPRAIN, INITIAL ENCOUNTER: ICD-10-CM

## 2019-09-06 DIAGNOSIS — G89.4 CHRONIC PAIN SYNDROME: Chronic | ICD-10-CM

## 2019-09-06 DIAGNOSIS — S33.5XXA SPRAIN OF LOW BACK, INITIAL ENCOUNTER: ICD-10-CM

## 2019-09-06 DIAGNOSIS — M51.27 PROLAPSED LUMBOSACRAL INTERVERTEBRAL DISC: ICD-10-CM

## 2019-09-06 DIAGNOSIS — S33.5XXA LOW BACK SPRAIN, INITIAL ENCOUNTER: ICD-10-CM

## 2019-09-06 DIAGNOSIS — S33.5XXA SPRAIN OF LUMBAR REGION, INITIAL ENCOUNTER: ICD-10-CM

## 2019-09-06 PROCEDURE — 99214 OFFICE O/P EST MOD 30 MIN: CPT | Performed by: INTERNAL MEDICINE

## 2019-09-06 RX ORDER — HYDROCODONE BITARTRATE AND ACETAMINOPHEN 7.5; 325 MG/1; MG/1
1 TABLET ORAL EVERY 6 HOURS PRN
Qty: 84 TABLET | Refills: 0 | Status: SHIPPED | OUTPATIENT
Start: 2019-09-06 | End: 2019-10-03 | Stop reason: SDUPTHER

## 2019-09-06 RX ORDER — AMITRIPTYLINE HYDROCHLORIDE 25 MG/1
25-50 TABLET, FILM COATED ORAL NIGHTLY PRN
Qty: 60 TABLET | Refills: 0 | Status: SHIPPED | OUTPATIENT
Start: 2019-09-06 | End: 2019-11-07 | Stop reason: SDUPTHER

## 2019-09-06 RX ORDER — MORPHINE SULFATE 30 MG/1
30 CAPSULE, EXTENDED RELEASE ORAL DAILY
Qty: 28 CAPSULE | Refills: 0 | Status: SHIPPED | OUTPATIENT
Start: 2019-09-06 | End: 2019-10-03 | Stop reason: SDUPTHER

## 2019-09-06 NOTE — PROGRESS NOTES
caffeine or other stimulants after noon, alcohol use near bedtime, long or frequent naps during the day, erratic sleep schedule, heavy meals near bedtime, vigorous exercise near bedtime and use of electronic devices near bedtime   -Continue with Elavil increase to 1-2 at night   -Adv Biofeedback, relaxation and meditation techniques. Referral to psychologist for CBT was also discussed with patient   -He was advised to increase fluids ( 5-7  glasses of fluid daily), limit caffeine, avoid cheese products, increase dietary fiber, increase activity and exercise as tolerated and relax regularly and enjoy meals   -Walking as tolerated   -Continue with Cymbalta and Mobic   -Advised caffeine reduction, dietary changes, elevate head end of bed, NPO after supper, if using alcohol advised reduction of alcohol intake, tobacco cessation if smoking, weight loss   Mr. Ira Tam will be prescribed  the medications  listed below which are for treatment of his presenting  medical problems which for this visit include:   Diagnoses of bwc-Prolapsed lumbosacral intervertebral disc, BWC Lumbar sprain, initial encounter, BWC Sprain of low back, initial encounter, Lumbar sprain, initial encounter, Sprain of low back, initial encounter, Chronic pain syndrome, BWC Sprain of lumbar region, initial encounter, and BWC Low back sprain, initial encounter were pertinent to this visit. Medications/orders associated with this visit:    Current Outpatient Medications   Medication Sig Dispense Refill    magnesium oxide (MAG-OX) 400 MG tablet Take one tablet Po BID 60 tablet 0    meloxicam (MOBIC) 15 MG tablet Take 1 tablet by mouth daily as needed for Pain 30 tablet 0    DULoxetine (CYMBALTA) 30 MG extended release capsule Take 1 capsule by mouth daily 30 capsule 0    amitriptyline (ELAVIL) 25 MG tablet Take 1 tablet by mouth nightly as needed for Sleep 30 tablet 0    NOVOLOG 100 UNIT/ML injection continuous.  Insulin pump averages 50-80 units daily  aspirin 325 MG tablet Take 325 mg by mouth daily.  carvedilol (COREG) 6.25 MG tablet Take 6.25 mg by mouth 2 times daily (with meals).  atorvastatin (LIPITOR) 80 MG tablet Take 80 mg by mouth nightly.  clopidogrel (PLAVIX) 75 MG tablet Take 75 mg by mouth daily.  lisinopril (PRINIVIL;ZESTRIL) 10 MG tablet Take 10 mg by mouth daily. No current facility-administered medications for this visit. Goals of current treatment regimen include improvement in pain, restoration of functioning- with focus on improvement in physical performance, general activity, work or disability,emotional distress, health care utilization and  decreased medication consumption. Will continue to monitor progress towards achieving/maintaining therapeutic goals with special emphasis on  1. Improvement in perceived interfernce  of pain with ADL's. Ability to do home exercises independently. Ability to do household chores indoor and/or outdoor work and social and leisure activities. To increase flexibility/ROM, strength and endurance. Improve psychosocial and physical functioning.- he is showing progression towards this treatment goal with the current regimen. 2. Improving sleep to 6-7 hours a night. Improve mood/ anxiety and depression symptoms such as crying spells, low energy, problems with concentration, motivation.- he is not showing any significant progress/or showing regression  towards this goal and reassessment and adjustment of goals/treatment have been made. 3. Reduction of reliance on opioid analgesia/more appropriate opioid use. - he is showing progression towards this treatment goal with the current regimen. 4. Ability to focus/concentrate at work and perform the duties required of him at work  Sit through a workday without lower extremity symptoms. Stand 30-60 minutes without lower extremity symptoms. Ability to lift up to 10-20 lbs. Ability to go up and down stairs.   Sit 30-60 minutes

## 2019-10-03 ENCOUNTER — OFFICE VISIT (OUTPATIENT)
Dept: PAIN MANAGEMENT | Age: 54
End: 2019-10-03
Payer: COMMERCIAL

## 2019-10-03 VITALS
WEIGHT: 205 LBS | DIASTOLIC BLOOD PRESSURE: 86 MMHG | HEART RATE: 69 BPM | SYSTOLIC BLOOD PRESSURE: 138 MMHG | BODY MASS INDEX: 29.41 KG/M2

## 2019-10-03 DIAGNOSIS — S33.5XXA LUMBAR SPRAIN, INITIAL ENCOUNTER: ICD-10-CM

## 2019-10-03 DIAGNOSIS — S33.5XXA SPRAIN OF LOW BACK, INITIAL ENCOUNTER: ICD-10-CM

## 2019-10-03 DIAGNOSIS — S33.5XXA SPRAIN OF LUMBAR REGION, INITIAL ENCOUNTER: ICD-10-CM

## 2019-10-03 DIAGNOSIS — S33.5XXA LOW BACK SPRAIN, INITIAL ENCOUNTER: ICD-10-CM

## 2019-10-03 DIAGNOSIS — G89.4 CHRONIC PAIN SYNDROME: Chronic | ICD-10-CM

## 2019-10-03 DIAGNOSIS — M51.27 PROLAPSED LUMBOSACRAL INTERVERTEBRAL DISC: ICD-10-CM

## 2019-10-03 PROCEDURE — 99213 OFFICE O/P EST LOW 20 MIN: CPT | Performed by: INTERNAL MEDICINE

## 2019-10-03 RX ORDER — MORPHINE SULFATE 30 MG/1
30 CAPSULE, EXTENDED RELEASE ORAL DAILY
Qty: 35 CAPSULE | Refills: 0 | Status: SHIPPED | OUTPATIENT
Start: 2019-10-03 | End: 2019-11-01 | Stop reason: SDUPTHER

## 2019-10-03 RX ORDER — HYDROCODONE BITARTRATE AND ACETAMINOPHEN 7.5; 325 MG/1; MG/1
1 TABLET ORAL EVERY 6 HOURS PRN
Qty: 105 TABLET | Refills: 0 | Status: SHIPPED | OUTPATIENT
Start: 2019-10-03 | End: 2019-11-01 | Stop reason: SDUPTHER

## 2019-10-31 DIAGNOSIS — S33.5XXA SPRAIN OF LOW BACK, INITIAL ENCOUNTER: ICD-10-CM

## 2019-10-31 DIAGNOSIS — M51.27 PROLAPSED LUMBOSACRAL INTERVERTEBRAL DISC: ICD-10-CM

## 2019-10-31 DIAGNOSIS — G89.4 CHRONIC PAIN SYNDROME: Chronic | ICD-10-CM

## 2019-10-31 DIAGNOSIS — S33.5XXA SPRAIN OF LUMBAR REGION, INITIAL ENCOUNTER: ICD-10-CM

## 2019-10-31 DIAGNOSIS — S33.5XXA LUMBAR SPRAIN, INITIAL ENCOUNTER: ICD-10-CM

## 2019-10-31 DIAGNOSIS — S33.5XXA LOW BACK SPRAIN, INITIAL ENCOUNTER: ICD-10-CM

## 2019-10-31 RX ORDER — HYDROCODONE BITARTRATE AND ACETAMINOPHEN 7.5; 325 MG/1; MG/1
1 TABLET ORAL EVERY 6 HOURS PRN
Qty: 15 TABLET | Refills: 0 | Status: CANCELLED | OUTPATIENT
Start: 2019-10-31 | End: 2019-11-05

## 2019-10-31 RX ORDER — MORPHINE SULFATE 30 MG/1
30 CAPSULE, EXTENDED RELEASE ORAL DAILY
Qty: 5 CAPSULE | Refills: 0 | Status: CANCELLED | OUTPATIENT
Start: 2019-10-31 | End: 2019-11-05

## 2019-11-07 ENCOUNTER — OFFICE VISIT (OUTPATIENT)
Dept: PAIN MANAGEMENT | Age: 54
End: 2019-11-07
Payer: COMMERCIAL

## 2019-11-07 VITALS
SYSTOLIC BLOOD PRESSURE: 157 MMHG | WEIGHT: 190 LBS | DIASTOLIC BLOOD PRESSURE: 89 MMHG | HEART RATE: 68 BPM | BODY MASS INDEX: 27.26 KG/M2

## 2019-11-07 DIAGNOSIS — S33.5XXA SPRAIN OF LOW BACK, INITIAL ENCOUNTER: ICD-10-CM

## 2019-11-07 DIAGNOSIS — G89.4 CHRONIC PAIN SYNDROME: Chronic | ICD-10-CM

## 2019-11-07 DIAGNOSIS — M51.27 PROLAPSED LUMBOSACRAL INTERVERTEBRAL DISC: ICD-10-CM

## 2019-11-07 DIAGNOSIS — S33.5XXA LUMBAR SPRAIN, INITIAL ENCOUNTER: ICD-10-CM

## 2019-11-07 DIAGNOSIS — S33.5XXA LOW BACK SPRAIN, INITIAL ENCOUNTER: ICD-10-CM

## 2019-11-07 DIAGNOSIS — S33.5XXA SPRAIN OF LUMBAR REGION, INITIAL ENCOUNTER: ICD-10-CM

## 2019-11-07 PROCEDURE — 99214 OFFICE O/P EST MOD 30 MIN: CPT | Performed by: INTERNAL MEDICINE

## 2019-11-07 RX ORDER — MAGNESIUM OXIDE 400 MG/1
TABLET ORAL
Qty: 60 TABLET | Refills: 0 | Status: SHIPPED | OUTPATIENT
Start: 2019-11-07 | End: 2019-12-06 | Stop reason: SDUPTHER

## 2019-11-07 RX ORDER — MORPHINE SULFATE 30 MG/1
30 CAPSULE, EXTENDED RELEASE ORAL DAILY
Qty: 28 CAPSULE | Refills: 0 | Status: SHIPPED | OUTPATIENT
Start: 2019-11-07 | End: 2019-12-06 | Stop reason: SDUPTHER

## 2019-11-07 RX ORDER — MELOXICAM 15 MG/1
15 TABLET ORAL DAILY PRN
Qty: 30 TABLET | Refills: 0 | Status: SHIPPED | OUTPATIENT
Start: 2019-11-07 | End: 2019-12-06 | Stop reason: SDUPTHER

## 2019-11-07 RX ORDER — AMITRIPTYLINE HYDROCHLORIDE 25 MG/1
25-50 TABLET, FILM COATED ORAL NIGHTLY PRN
Qty: 60 TABLET | Refills: 0 | Status: SHIPPED | OUTPATIENT
Start: 2019-11-07 | End: 2019-12-06 | Stop reason: SDUPTHER

## 2019-11-07 RX ORDER — DULOXETIN HYDROCHLORIDE 30 MG/1
30 CAPSULE, DELAYED RELEASE ORAL DAILY
Qty: 30 CAPSULE | Refills: 0 | Status: SHIPPED | OUTPATIENT
Start: 2019-11-07 | End: 2019-12-06 | Stop reason: SDUPTHER

## 2019-11-07 RX ORDER — HYDROCODONE BITARTRATE AND ACETAMINOPHEN 7.5; 325 MG/1; MG/1
1 TABLET ORAL EVERY 6 HOURS PRN
Qty: 84 TABLET | Refills: 0 | Status: SHIPPED | OUTPATIENT
Start: 2019-11-07 | End: 2019-12-06 | Stop reason: SDUPTHER

## 2019-12-06 ENCOUNTER — OFFICE VISIT (OUTPATIENT)
Dept: PAIN MANAGEMENT | Age: 54
End: 2019-12-06
Payer: COMMERCIAL

## 2019-12-06 VITALS
SYSTOLIC BLOOD PRESSURE: 134 MMHG | BODY MASS INDEX: 27.26 KG/M2 | DIASTOLIC BLOOD PRESSURE: 78 MMHG | WEIGHT: 190 LBS | HEART RATE: 68 BPM

## 2019-12-06 DIAGNOSIS — S33.5XXA SPRAIN OF LUMBAR REGION, INITIAL ENCOUNTER: ICD-10-CM

## 2019-12-06 DIAGNOSIS — S33.5XXA SPRAIN OF LOW BACK, INITIAL ENCOUNTER: ICD-10-CM

## 2019-12-06 DIAGNOSIS — M51.27 PROLAPSED LUMBOSACRAL INTERVERTEBRAL DISC: ICD-10-CM

## 2019-12-06 DIAGNOSIS — S33.5XXA LOW BACK SPRAIN, INITIAL ENCOUNTER: ICD-10-CM

## 2019-12-06 DIAGNOSIS — S33.5XXA LUMBAR SPRAIN, INITIAL ENCOUNTER: ICD-10-CM

## 2019-12-06 DIAGNOSIS — G89.4 CHRONIC PAIN SYNDROME: Chronic | ICD-10-CM

## 2019-12-06 PROCEDURE — 99213 OFFICE O/P EST LOW 20 MIN: CPT | Performed by: INTERNAL MEDICINE

## 2019-12-06 RX ORDER — AMITRIPTYLINE HYDROCHLORIDE 25 MG/1
25-50 TABLET, FILM COATED ORAL NIGHTLY PRN
Qty: 60 TABLET | Refills: 0 | Status: SHIPPED | OUTPATIENT
Start: 2019-12-06 | End: 2020-01-04 | Stop reason: SDUPTHER

## 2019-12-06 RX ORDER — MAGNESIUM OXIDE 400 MG/1
TABLET ORAL
Qty: 60 TABLET | Refills: 0 | Status: SHIPPED | OUTPATIENT
Start: 2019-12-06 | End: 2020-01-04 | Stop reason: SDUPTHER

## 2019-12-06 RX ORDER — MELOXICAM 15 MG/1
15 TABLET ORAL DAILY PRN
Qty: 30 TABLET | Refills: 0 | Status: SHIPPED | OUTPATIENT
Start: 2019-12-06 | End: 2020-01-04 | Stop reason: SDUPTHER

## 2019-12-06 RX ORDER — HYDROCODONE BITARTRATE AND ACETAMINOPHEN 7.5; 325 MG/1; MG/1
1 TABLET ORAL EVERY 6 HOURS PRN
Qty: 90 TABLET | Refills: 0 | Status: SHIPPED | OUTPATIENT
Start: 2019-12-06 | End: 2020-01-04 | Stop reason: SDUPTHER

## 2019-12-06 RX ORDER — DULOXETIN HYDROCHLORIDE 30 MG/1
30 CAPSULE, DELAYED RELEASE ORAL DAILY
Qty: 30 CAPSULE | Refills: 0 | Status: SHIPPED | OUTPATIENT
Start: 2019-12-06 | End: 2020-01-04 | Stop reason: SDUPTHER

## 2019-12-06 RX ORDER — MORPHINE SULFATE 30 MG/1
30 CAPSULE, EXTENDED RELEASE ORAL DAILY
Qty: 30 CAPSULE | Refills: 0 | Status: SHIPPED | OUTPATIENT
Start: 2019-12-06 | End: 2020-01-04 | Stop reason: SDUPTHER

## 2020-01-04 ENCOUNTER — OFFICE VISIT (OUTPATIENT)
Dept: PAIN MANAGEMENT | Age: 55
End: 2020-01-04
Payer: COMMERCIAL

## 2020-01-04 VITALS
WEIGHT: 205 LBS | HEART RATE: 96 BPM | DIASTOLIC BLOOD PRESSURE: 87 MMHG | BODY MASS INDEX: 29.41 KG/M2 | SYSTOLIC BLOOD PRESSURE: 139 MMHG

## 2020-01-04 PROCEDURE — 99214 OFFICE O/P EST MOD 30 MIN: CPT | Performed by: INTERNAL MEDICINE

## 2020-01-04 RX ORDER — MORPHINE SULFATE 30 MG/1
30 CAPSULE, EXTENDED RELEASE ORAL DAILY
Qty: 28 CAPSULE | Refills: 0 | Status: SHIPPED | OUTPATIENT
Start: 2020-01-04 | End: 2020-01-31 | Stop reason: SDUPTHER

## 2020-01-04 RX ORDER — HYDROCODONE BITARTRATE AND ACETAMINOPHEN 7.5; 325 MG/1; MG/1
1 TABLET ORAL EVERY 6 HOURS PRN
Qty: 84 TABLET | Refills: 0 | Status: SHIPPED | OUTPATIENT
Start: 2020-01-04 | End: 2020-01-31 | Stop reason: SDUPTHER

## 2020-01-04 RX ORDER — MELOXICAM 15 MG/1
15 TABLET ORAL DAILY PRN
Qty: 30 TABLET | Refills: 0 | Status: SHIPPED | OUTPATIENT
Start: 2020-01-04 | End: 2020-01-31 | Stop reason: SDUPTHER

## 2020-01-04 RX ORDER — DULOXETIN HYDROCHLORIDE 30 MG/1
30 CAPSULE, DELAYED RELEASE ORAL DAILY
Qty: 30 CAPSULE | Refills: 0 | Status: SHIPPED | OUTPATIENT
Start: 2020-01-04 | End: 2020-01-31 | Stop reason: SDUPTHER

## 2020-01-04 RX ORDER — AMITRIPTYLINE HYDROCHLORIDE 25 MG/1
25-50 TABLET, FILM COATED ORAL NIGHTLY PRN
Qty: 60 TABLET | Refills: 0 | Status: SHIPPED | OUTPATIENT
Start: 2020-01-04 | End: 2020-01-31 | Stop reason: SDUPTHER

## 2020-01-04 RX ORDER — MAGNESIUM OXIDE 400 MG/1
TABLET ORAL
Qty: 60 TABLET | Refills: 0 | Status: SHIPPED | OUTPATIENT
Start: 2020-01-04 | End: 2020-01-31 | Stop reason: SDUPTHER

## 2020-01-04 NOTE — PROGRESS NOTES
Caren Gillespie  1965  [id number]    HISTORY OF PRESENT ILLNESS:  Mr. Andre Kelley is a 62 y.o. male returns for a follow up visit for multiple medical problems. His  presenting problems are   1. Chronic pain syndrome    2. bwc-Prolapsed lumbosacral intervertebral disc    3. BWC Lumbar sprain, initial encounter    4. BWC Sprain of low back, initial encounter    5. Lumbar sprain, initial encounter    6. Sprain of low back, initial encounter    7. BWC Sprain of lumbar region, initial encounter    8. BWC Low back sprain, initial encounter    . As per information/history obtained from the PADT(patient assessment and documentation tool) -  He complains of pain in the lower back with radiation to the buttocks, hips Left and upper leg Left He rates the pain 7/10 and describes it as sharp, aching, burning. Pain is made worse by: movement, walking, standing, sitting, bending, lifting. Current treatment regimen has helped relieve about 60% of the pain. He denies side effects from the current pain regimen. Patient reports that since the last follow up visit the physical functioning is unchanged, family/social relationships are worse, mood is worse and sleep patterns are worse, and that the overall functioning is worse. Patient denies neurological bowel or bladder. Patient denies misusing/abusing his narcotic pain medications or using any illegal drugs. There are No indicators for possible drug abuse, addiction or diversion problems. Upon obtaining the medical history from Mr. Andre Kelley regarding today's office visit for his presenting problems, Mr. Andre Kelley states he has been doing about the same. He says he is having some problems with sleep, he says Elavil helps most of the time, Patient states his sleep is fair. Has fairly normal sleep latency. Averages about 4-6 hours of sleep a night. Denies any signs of sleep apnea. Feels somewhat rested in the morning. Patient's  subjective report of his mood is fair.  he describes occasional symptoms of depression, occasional  irritability and some mood swings. Describes his mood as being neutral and reports some pleasure in his daily activities. Reports  fair  appetite, energy and concentration. Able to function well in different aspects of his daily activities. Denies suicidal or homicidal ideation. Denies any complaints of increased tension, does   Worry sometimes and occasional  irritability  he denies any c/o increased anxiety, No c/o panic attacks or symptoms of PTSD, he is using Cymbalta. Patient denies any side effects with the opioid. Patient denies any constipation symptoms or cognitive side effects. He reports he is working full time still. ALLERGIES/PAST MED/FAM/SOC HISTORY: Mr. Libia Louis allergies, past medical, family and social history were reviewed in the chart and also listed below. Social History     Socioeconomic History    Marital status:      Spouse name: Not on file    Number of children: Not on file    Years of education: Not on file    Highest education level: Not on file   Occupational History    Not on file   Social Needs    Financial resource strain: Not on file    Food insecurity:     Worry: Not on file     Inability: Not on file    Transportation needs:     Medical: Not on file     Non-medical: Not on file   Tobacco Use    Smoking status: Former Smoker     Packs/day: 0.50     Years: 15.00     Pack years: 7.50     Types: Cigarettes    Smokeless tobacco: Former User   Substance and Sexual Activity    Alcohol use:  Yes     Alcohol/week: 2.0 standard drinks     Types: 2 Cans of beer per week     Comment: occa    Drug use: No    Sexual activity: Not on file   Lifestyle    Physical activity:     Days per week: Not on file     Minutes per session: Not on file    Stress: Not on file   Relationships    Social connections:     Talks on phone: Not on file     Gets together: Not on file     Attends [employer] service: Not on file     Active member of club or organization: Not on file     Attends meetings of clubs or organizations: Not on file     Relationship status: Not on file    Intimate partner violence:     Fear of current or ex partner: Not on file     Emotionally abused: Not on file     Physically abused: Not on file     Forced sexual activity: Not on file   Other Topics Concern    Not on file   Social History Narrative    Not on file       Mr. Darian Arciniega current medications are   Outpatient Medications Prior to Visit   Medication Sig Dispense Refill    amitriptyline (ELAVIL) 25 MG tablet Take 1-2 tablets by mouth nightly as needed for Sleep 60 tablet 0    magnesium oxide (MAG-OX) 400 MG tablet Take one tablet Po BID 60 tablet 0    meloxicam (MOBIC) 15 MG tablet Take 1 tablet by mouth daily as needed for Pain 30 tablet 0    DULoxetine (CYMBALTA) 30 MG extended release capsule Take 1 capsule by mouth daily 30 capsule 0    morphine (LAURA) 30 MG extended release capsule Take 1 capsule by mouth daily for 30 days. 30 capsule 0    HYDROcodone-acetaminophen (NORCO) 7.5-325 MG per tablet Take 1 tablet by mouth every 6 hours as needed for Pain for up to 30 days. MAX 3 PER DAY 90 tablet 0    NOVOLOG 100 UNIT/ML injection continuous. Insulin pump averages 50-80 units daily      aspirin 325 MG tablet Take 325 mg by mouth daily.  carvedilol (COREG) 6.25 MG tablet Take 6.25 mg by mouth 2 times daily (with meals).  atorvastatin (LIPITOR) 80 MG tablet Take 80 mg by mouth nightly.  clopidogrel (PLAVIX) 75 MG tablet Take 75 mg by mouth daily.  lisinopril (PRINIVIL;ZESTRIL) 10 MG tablet Take 10 mg by mouth daily. No facility-administered medications prior to visit. REVIEW OF SYSTEMS:   Constitutional: Negative for fatigue and unexpected weight change. Eyes: Negative for visual disturbance. Respiratory: Negative for shortness of breath.     Cardiovascular: Negative for chest pain, palpitations  Gastrointestinal: Negative for blood in stool, abdominal distention, nausea, vomiting, abdominal pain, diarrhea,constipation. Skin: Negative for color change or any abnormal bruising. Neurological: Negative for speech difficulty, weakness and light-headedness, dizziness, tremors, sleepiness  Psychiatric/Behavioral: Negative for suicidal ideas, hallucinations, behavioral problems, self-injury, decreased concentration/cognition, agitation, confusion. PHYSICAL EXAM:   Nursing note and vitals reviewed. /87   Pulse 96   Wt 205 lb (93 kg)   BMI 29.41 kg/m²   General Appearance: Patient is well nourished, well developed, well groomed and in no acute distress. Skin: Skin is warm and dry, good turgor . No rash or lesions noted. He is not diaphoretic. Pulmonary/Chest: Effort normal. No respiratory distress or use of accessory muscles. Auscultation revealing normal air entry. He does not have wheezes in the lung fields. He has no rales. Cardiovascular: Normal rate, regular rhythm, normal heart sounds, and does not have murmur. Exam reveals no gallop and no friction rub. Abdominal: Soft. Bowel sounds are normal.  On inspection of abdomen is flat no distension and no mass. No tenderness. He has no rebound and no guarding. Musculoskeletal / Extremities: Range of motion is normal. Gait is normal, assistive devices use: none. He exhibits edema: none, and no tenderness. Neurological/Psychiatric:He is alert and oriented to person, place, and time. Coordination is  normal.   Judgement and Insight is normal  His mood is Appropriate and affect is Flat/blunted and Anxious . His behavior is normal.   thought content normal.        IMPRESSION:     1. Chronic pain syndrome    2. bwc-Prolapsed lumbosacral intervertebral disc    3. BWC Lumbar sprain, initial encounter    4. BWC Sprain of low back, initial encounter    5. Lumbar sprain, initial encounter    6. Sprain of low back, initial encounter    7.  BWC Sprain of lumbar region, initial encounter    8. BWC Low back sprain, initial encounter        PLAN:  Informed verbal consent was obtained.  -continue with current opioid regimen   -Adv Biofeedback, relaxation and meditation techniques. Referral to psychologist for CBT was also discussed with patient  -he was advised proper sleep hygiene-told to avoid:use of caffeine or other stimulants after noon, alcohol use near bedtime, long or frequent naps during the day, erratic sleep schedule, heavy meals near bedtime, vigorous exercise near bedtime and use of electronic devices near bedtime  -continue with Laura along with Norco for BTP  -back stretching exercises as advised  -Monitor blood sugar regularly, diabetic control- adv diabetic diet. Goal for fasting blood sugars around 120. Follow up with Endocrinologist/PCP also for on going management    -continue with Mobic 15 daily    Mr. Denny Horton will be prescribed  the medications  listed below which are for treatment of his presenting  medical problems which for this visit include:   Diagnoses of Chronic pain syndrome, bwc-Prolapsed lumbosacral intervertebral disc, BWC Lumbar sprain, initial encounter, BWC Sprain of low back, initial encounter, Lumbar sprain, initial encounter, Sprain of low back, initial encounter, BWC Sprain of lumbar region, initial encounter, and BWC Low back sprain, initial encounter were pertinent to this visit.   Medications/orders associated with this visit:    Current Outpatient Medications   Medication Sig Dispense Refill    amitriptyline (ELAVIL) 25 MG tablet Take 1-2 tablets by mouth nightly as needed for Sleep 60 tablet 0    magnesium oxide (MAG-OX) 400 MG tablet Take one tablet Po BID 60 tablet 0    meloxicam (MOBIC) 15 MG tablet Take 1 tablet by mouth daily as needed for Pain 30 tablet 0    DULoxetine (CYMBALTA) 30 MG extended release capsule Take 1 capsule by mouth daily 30 capsule 0    morphine (LAURA) 30 MG extended release capsule Take 1 capsule by mouth daily for 30 days. 30 capsule 0    HYDROcodone-acetaminophen (NORCO) 7.5-325 MG per tablet Take 1 tablet by mouth every 6 hours as needed for Pain for up to 30 days. MAX 3 PER DAY 90 tablet 0    NOVOLOG 100 UNIT/ML injection continuous. Insulin pump averages 50-80 units daily      aspirin 325 MG tablet Take 325 mg by mouth daily.  carvedilol (COREG) 6.25 MG tablet Take 6.25 mg by mouth 2 times daily (with meals).  atorvastatin (LIPITOR) 80 MG tablet Take 80 mg by mouth nightly.  clopidogrel (PLAVIX) 75 MG tablet Take 75 mg by mouth daily.  lisinopril (PRINIVIL;ZESTRIL) 10 MG tablet Take 10 mg by mouth daily. No current facility-administered medications for this visit. Goals of current treatment regimen include improvement in pain, restoration of functioning- with focus on improvement in physical performance, general activity, work or disability,emotional distress, health care utilization and  decreased medication consumption. Will continue to monitor progress towards achieving/maintaining therapeutic goals with special emphasis on  1. Improvement in perceived interfernce  of pain with ADL's. Ability to do home exercises independently. Ability to do household chores indoor and/or outdoor work and social and leisure activities. To increase flexibility/ROM, strength and endurance. Improve psychosocial and physical functioning.- he is showing progression towards this treatment goal with the current regimen. 2. Improving sleep to 6-7 hours a night. Improve mood/ anxiety and depression symptoms such as crying spells, low energy, problems with concentration, motivation.- he is showing progression towards this treatment goal with the current regimen. 3. Reduction of reliance on opioid analgesia/more appropriate opioid use. - he is showing progression towards this treatment goal with the current regimen.    4. Ability to focus/concentrate at work and perform the duties required of him at work  Yassine Gabriel through a workday without lower extremity symptoms. Stand 30-60 minutes without lower extremity symptoms. Ability to lift up to 10-20 lbs. Ability to go up and down stairs. Sit 30-60 minutes  Without having to stand up frequently. - he is maintaining/progressing towards his work related goals with the current regimen. Risks and benefits of the medications and other alternative treatments have been/were  discussed with the patient. Any questions on the  common side effects of these medications were also answered. He was advised against drinking alcohol with the narcotic pain medicines, advised against driving or handling machinery when  starting or adjusting the dose of medicines, feeling groggy or drowsy, or if having any cognitive issues related to the current medications. Heis fully aware of the risk of overdose and death, if medicines are misused and not taken as prescribed. If he develops new symptoms or if the symptoms worsen, he was told to call the office. .  Thank you for allowing me to participate in the care of this patient.     Russell Elizabeth MD.    Cc: Alfred House MD

## 2020-01-31 ENCOUNTER — OFFICE VISIT (OUTPATIENT)
Dept: PAIN MANAGEMENT | Age: 55
End: 2020-01-31
Payer: COMMERCIAL

## 2020-01-31 VITALS
HEART RATE: 77 BPM | WEIGHT: 205 LBS | SYSTOLIC BLOOD PRESSURE: 144 MMHG | DIASTOLIC BLOOD PRESSURE: 82 MMHG | BODY MASS INDEX: 29.41 KG/M2

## 2020-01-31 PROCEDURE — 99214 OFFICE O/P EST MOD 30 MIN: CPT | Performed by: INTERNAL MEDICINE

## 2020-01-31 RX ORDER — AMITRIPTYLINE HYDROCHLORIDE 25 MG/1
25-50 TABLET, FILM COATED ORAL NIGHTLY PRN
Qty: 60 TABLET | Refills: 0 | Status: SHIPPED
Start: 2020-01-31 | End: 2020-03-02 | Stop reason: ALTCHOICE

## 2020-01-31 RX ORDER — MAGNESIUM OXIDE 400 MG/1
TABLET ORAL
Qty: 60 TABLET | Refills: 0 | Status: SHIPPED | OUTPATIENT
Start: 2020-01-31 | End: 2020-03-02 | Stop reason: SDUPTHER

## 2020-01-31 RX ORDER — MELOXICAM 15 MG/1
15 TABLET ORAL DAILY PRN
Qty: 30 TABLET | Refills: 0 | Status: SHIPPED | OUTPATIENT
Start: 2020-01-31 | End: 2020-03-02 | Stop reason: SDUPTHER

## 2020-01-31 RX ORDER — HYDROCODONE BITARTRATE AND ACETAMINOPHEN 7.5; 325 MG/1; MG/1
1 TABLET ORAL EVERY 6 HOURS PRN
Qty: 90 TABLET | Refills: 0 | Status: SHIPPED | OUTPATIENT
Start: 2020-01-31 | End: 2020-03-02 | Stop reason: SDUPTHER

## 2020-01-31 RX ORDER — MORPHINE SULFATE 30 MG/1
30 CAPSULE, EXTENDED RELEASE ORAL DAILY
Qty: 30 CAPSULE | Refills: 0 | Status: SHIPPED | OUTPATIENT
Start: 2020-01-31 | End: 2020-03-02 | Stop reason: SDUPTHER

## 2020-01-31 RX ORDER — DULOXETIN HYDROCHLORIDE 60 MG/1
30 CAPSULE, DELAYED RELEASE ORAL DAILY
Qty: 30 CAPSULE | Refills: 0 | Status: SHIPPED | OUTPATIENT
Start: 2020-01-31 | End: 2020-02-26

## 2020-01-31 NOTE — PROGRESS NOTES
energy and concentration. Able to function well in different aspects of his daily activities. Denies suicidal or homicidal ideation. Denies any complaints of increased tension, does   Worry sometimes and occasional  irritability  he denies any c/o increased anxiety, No c/o panic attacks or symptoms of PTSD. Patient states he sleeps well. Has normal sleep latency. Averages about 5-7 hours of sleep a night. Denies any signs of sleep apnea. Feels rested in the AM. Denies any sleep attacks during the day. Medication regimen helps with maintaining/regulating sleep. He mentions he is using Elavil and Melatonin sometime. He denies any constipation symptoms. He reports his weight has been stable. ALLERGIES/PAST MED/FAM/SOC HISTORY: Mr. Yusef Flannery allergies, past medical, family and social history were reviewed in the chart and also listed below. Social History     Socioeconomic History    Marital status:      Spouse name: Not on file    Number of children: Not on file    Years of education: Not on file    Highest education level: Not on file   Occupational History    Not on file   Social Needs    Financial resource strain: Not on file    Food insecurity:     Worry: Not on file     Inability: Not on file    Transportation needs:     Medical: Not on file     Non-medical: Not on file   Tobacco Use    Smoking status: Former Smoker     Packs/day: 0.50     Years: 15.00     Pack years: 7.50     Types: Cigarettes    Smokeless tobacco: Former User   Substance and Sexual Activity    Alcohol use:  Yes     Alcohol/week: 2.0 standard drinks     Types: 2 Cans of beer per week     Comment: occa    Drug use: No    Sexual activity: Not on file   Lifestyle    Physical activity:     Days per week: Not on file     Minutes per session: Not on file    Stress: Not on file   Relationships    Social connections:     Talks on phone: Not on file     Gets together: Not on file     Attends Muslim service: Not on file Active member of club or organization: Not on file     Attends meetings of clubs or organizations: Not on file     Relationship status: Not on file    Intimate partner violence:     Fear of current or ex partner: Not on file     Emotionally abused: Not on file     Physically abused: Not on file     Forced sexual activity: Not on file   Other Topics Concern    Not on file   Social History Narrative    Not on file       Mr. Karina Taylor current medications are   Outpatient Medications Prior to Visit   Medication Sig Dispense Refill    amitriptyline (ELAVIL) 25 MG tablet Take 1-2 tablets by mouth nightly as needed for Sleep 60 tablet 0    magnesium oxide (MAG-OX) 400 MG tablet Take one tablet Po BID 60 tablet 0    meloxicam (MOBIC) 15 MG tablet Take 1 tablet by mouth daily as needed for Pain 30 tablet 0    DULoxetine (CYMBALTA) 30 MG extended release capsule Take 1 capsule by mouth daily 30 capsule 0    morphine (LAURA) 30 MG extended release capsule Take 1 capsule by mouth daily for 28 days. 28 capsule 0    HYDROcodone-acetaminophen (NORCO) 7.5-325 MG per tablet Take 1 tablet by mouth every 6 hours as needed for Pain for up to 28 days. MAX 3 PER DAY 84 tablet 0    NOVOLOG 100 UNIT/ML injection continuous. Insulin pump averages 50-80 units daily      aspirin 325 MG tablet Take 325 mg by mouth daily.  carvedilol (COREG) 6.25 MG tablet Take 6.25 mg by mouth 2 times daily (with meals).  atorvastatin (LIPITOR) 80 MG tablet Take 80 mg by mouth nightly.  clopidogrel (PLAVIX) 75 MG tablet Take 75 mg by mouth daily.  lisinopril (PRINIVIL;ZESTRIL) 10 MG tablet Take 10 mg by mouth daily. No facility-administered medications prior to visit. REVIEW OF SYSTEMS:   Constitutional: Negative for fatigue and unexpected weight change. Eyes: Negative for visual disturbance. Respiratory: Negative for shortness of breath.     Cardiovascular: Negative for chest pain, palpitations  Gastrointestinal: lumbar region, initial encounter    8. BWC Low back sprain, initial encounter        PLAN:  Informed verbal consent was obtained.  -Continue with current opioid regimen Jackie with Norco for BTP   -Increase Cymbalta to 60 mg   -CBT techniques- relaxation therapies such as biofeedback, mindfulness based stress reduction, imagery, cognitive restructuring, problem solving discussed with patient   -He was advised to increase fluids ( 5-7  glasses of fluid daily), limit caffeine, avoid cheese products, increase dietary fiber, increase activity and exercise as tolerated and relax regularly and enjoy meals   -he was advised proper sleep hygiene-told to avoid:use of caffeine or other stimulants after noon, alcohol use near bedtime, long or frequent naps during the day, erratic sleep schedule, heavy meals near bedtime, vigorous exercise near bedtime and use of electronic devices near bedtime   -Continue with Elavil   -Advised caffeine reduction, dietary changes, elevate head end of bed, NPO after supper, if using alcohol advised reduction of alcohol intake, tobacco cessation if smoking, weight loss   -Continue with Mobic   -Labs ordered CBC, CMP, and TSH. Mr. Sue Alberto will be prescribed  the medications  listed below which are for treatment of his presenting  medical problems which for this visit include:   Diagnoses of bwc-Prolapsed lumbosacral intervertebral disc, BWC Lumbar sprain, initial encounter, BWC Sprain of low back, initial encounter, Lumbar sprain, initial encounter, Sprain of low back, initial encounter, Chronic pain syndrome, BWC Sprain of lumbar region, initial encounter, and BWC Low back sprain, initial encounter were pertinent to this visit.   Medications/orders associated with this visit:    Current Outpatient Medications   Medication Sig Dispense Refill    amitriptyline (ELAVIL) 25 MG tablet Take 1-2 tablets by mouth nightly as needed for Sleep 60 tablet 0    magnesium oxide (MAG-OX) 400 MG tablet Take one

## 2020-02-03 ENCOUNTER — TELEPHONE (OUTPATIENT)
Dept: PAIN MANAGEMENT | Age: 55
End: 2020-02-03

## 2020-02-10 ENCOUNTER — TELEPHONE (OUTPATIENT)
Dept: PAIN MANAGEMENT | Age: 55
End: 2020-02-10

## 2020-02-26 ENCOUNTER — HOSPITAL ENCOUNTER (EMERGENCY)
Age: 55
Discharge: HOME | End: 2020-02-27
Attending: EMERGENCY MEDICINE
Payer: COMMERCIAL

## 2020-02-26 PROCEDURE — 99283 EMERGENCY DEPT VISIT LOW MDM: CPT

## 2020-02-27 VITALS
BODY MASS INDEX: 29.35 KG/M2 | RESPIRATION RATE: 16 BRPM | OXYGEN SATURATION: 98 % | HEIGHT: 70 IN | WEIGHT: 205 LBS | SYSTOLIC BLOOD PRESSURE: 121 MMHG | DIASTOLIC BLOOD PRESSURE: 73 MMHG | TEMPERATURE: 97.9 F | HEART RATE: 54 BPM

## 2020-02-27 LAB
A/G RATIO: 1.5 (ref 1.1–2.2)
ALBUMIN SERPL-MCNC: 3.9 G/DL (ref 3.4–5)
ALP BLD-CCNC: 83 U/L (ref 40–129)
ALT SERPL-CCNC: 20 U/L (ref 10–40)
ANION GAP SERPL CALCULATED.3IONS-SCNC: 10 MMOL/L (ref 3–16)
AST SERPL-CCNC: 18 U/L (ref 15–37)
BASOPHILS ABSOLUTE: 0.1 K/UL (ref 0–0.2)
BASOPHILS RELATIVE PERCENT: 0.6 %
BILIRUB SERPL-MCNC: 0.3 MG/DL (ref 0–1)
BUN BLDV-MCNC: 17 MG/DL (ref 7–20)
CALCIUM SERPL-MCNC: 9 MG/DL (ref 8.3–10.6)
CHLORIDE BLD-SCNC: 97 MMOL/L (ref 99–110)
CO2: 27 MMOL/L (ref 21–32)
CREAT SERPL-MCNC: 0.9 MG/DL (ref 0.9–1.3)
EOSINOPHILS ABSOLUTE: 0.4 K/UL (ref 0–0.6)
EOSINOPHILS RELATIVE PERCENT: 4.7 %
GFR AFRICAN AMERICAN: >60
GFR NON-AFRICAN AMERICAN: >60
GLOBULIN: 2.6 G/DL
GLUCOSE BLD-MCNC: 301 MG/DL (ref 70–99)
HCT VFR BLD CALC: 44 % (ref 40.5–52.5)
HEMOGLOBIN: 15.3 G/DL (ref 13.5–17.5)
LYMPHOCYTES ABSOLUTE: 2.6 K/UL (ref 1–5.1)
LYMPHOCYTES RELATIVE PERCENT: 27.7 %
MAGNESIUM: 2.2 MG/DL (ref 1.8–2.4)
MCH RBC QN AUTO: 32.1 PG (ref 26–34)
MCHC RBC AUTO-ENTMCNC: 34.8 G/DL (ref 31–36)
MCV RBC AUTO: 92.2 FL (ref 80–100)
MONOCYTES ABSOLUTE: 0.7 K/UL (ref 0–1.3)
MONOCYTES RELATIVE PERCENT: 7.8 %
NEUTROPHILS ABSOLUTE: 5.6 K/UL (ref 1.7–7.7)
NEUTROPHILS RELATIVE PERCENT: 59.2 %
PDW BLD-RTO: 12.4 % (ref 12.4–15.4)
PHOSPHORUS: 3.6 MG/DL (ref 2.5–4.9)
PLATELET # BLD: 224 K/UL (ref 135–450)
PMV BLD AUTO: 8.7 FL (ref 5–10.5)
POTASSIUM REFLEX MAGNESIUM: 4 MMOL/L (ref 3.5–5.1)
RBC # BLD: 4.78 M/UL (ref 4.2–5.9)
SODIUM BLD-SCNC: 134 MMOL/L (ref 136–145)
TOTAL PROTEIN: 6.5 G/DL (ref 6.4–8.2)
WBC # BLD: 9.5 K/UL (ref 4–11)

## 2020-02-27 PROCEDURE — 83735 ASSAY OF MAGNESIUM: CPT

## 2020-02-27 PROCEDURE — 84100 ASSAY OF PHOSPHORUS: CPT

## 2020-02-27 PROCEDURE — 85025 COMPLETE CBC W/AUTO DIFF WBC: CPT

## 2020-02-27 PROCEDURE — 80053 COMPREHEN METABOLIC PANEL: CPT

## 2020-02-27 PROCEDURE — 36415 COLL VENOUS BLD VENIPUNCTURE: CPT

## 2020-02-27 RX ORDER — ZOLPIDEM TARTRATE 5 MG/1
5 TABLET ORAL NIGHTLY PRN
Qty: 7 TABLET | Refills: 0 | Status: SHIPPED | OUTPATIENT
Start: 2020-02-27 | End: 2020-03-02 | Stop reason: ALTCHOICE

## 2020-02-27 NOTE — ED NOTES
Discharge: Pt discharged to home as per order. IV removed. Scripts plus instructions given. Pt verbalized understanding. Denied questions.        Garcia Moe RN  02/27/20 5658

## 2020-03-02 ENCOUNTER — OFFICE VISIT (OUTPATIENT)
Dept: PAIN MANAGEMENT | Age: 55
End: 2020-03-02
Payer: COMMERCIAL

## 2020-03-02 VITALS
HEART RATE: 98 BPM | WEIGHT: 205 LBS | DIASTOLIC BLOOD PRESSURE: 88 MMHG | SYSTOLIC BLOOD PRESSURE: 138 MMHG | BODY MASS INDEX: 29.41 KG/M2

## 2020-03-02 PROCEDURE — 99214 OFFICE O/P EST MOD 30 MIN: CPT | Performed by: INTERNAL MEDICINE

## 2020-03-02 RX ORDER — MAGNESIUM OXIDE 400 MG/1
TABLET ORAL
Qty: 60 TABLET | Refills: 0 | Status: SHIPPED | OUTPATIENT
Start: 2020-03-02 | End: 2020-03-27 | Stop reason: SDUPTHER

## 2020-03-02 RX ORDER — MELOXICAM 15 MG/1
15 TABLET ORAL DAILY PRN
Qty: 30 TABLET | Refills: 0 | Status: SHIPPED | OUTPATIENT
Start: 2020-03-02 | End: 2020-03-27 | Stop reason: SDUPTHER

## 2020-03-02 RX ORDER — DULOXETIN HYDROCHLORIDE 30 MG/1
30 CAPSULE, DELAYED RELEASE ORAL DAILY
Qty: 30 CAPSULE | Refills: 0 | Status: SHIPPED | OUTPATIENT
Start: 2020-03-02 | End: 2020-03-27 | Stop reason: SDUPTHER

## 2020-03-02 RX ORDER — MORPHINE SULFATE 30 MG/1
30 CAPSULE, EXTENDED RELEASE ORAL DAILY
Qty: 28 CAPSULE | Refills: 0 | Status: SHIPPED | OUTPATIENT
Start: 2020-03-02 | End: 2020-03-27 | Stop reason: SDUPTHER

## 2020-03-02 RX ORDER — HYDROCODONE BITARTRATE AND ACETAMINOPHEN 7.5; 325 MG/1; MG/1
1 TABLET ORAL EVERY 6 HOURS PRN
Qty: 84 TABLET | Refills: 0 | Status: SHIPPED | OUTPATIENT
Start: 2020-03-02 | End: 2020-03-27 | Stop reason: SDUPTHER

## 2020-03-02 RX ORDER — QUETIAPINE FUMARATE 25 MG/1
25-50 TABLET, FILM COATED ORAL NIGHTLY
Qty: 60 TABLET | Refills: 0 | Status: SHIPPED | OUTPATIENT
Start: 2020-03-02 | End: 2020-03-27 | Stop reason: SDUPTHER

## 2020-03-02 NOTE — ED PROVIDER NOTES
or worsening symptoms occur. We have discussed the symptoms which are most concerning (e.g., changing or worsening pain, weakness, vomiting, fever) that necessitate immediate return. Discharge Medication List as of 2/27/2020  1:52 AM      START taking these medications    Details   zolpidem (AMBIEN) 5 MG tablet Take 1 tablet by mouth nightly as needed for Sleep for up to 7 doses. , Disp-7 tablet, R-0Print           CLINICAL IMPRESSION  1. Insomnia, unspecified type        Blood pressure 121/73, pulse 54, temperature 97.9 °F (36.6 °C), temperature source Oral, resp. rate 16, height 5' 10\" (1.778 m), weight 205 lb (93 kg), SpO2 98 %. DISPOSITION  Patient was discharged to home in good condition. This chart was generated in part by using Dragon Dictation system and may contain errors related to that system including errors in grammar, punctuation, and spelling, as well as words and phrases that may be inappropriate. When dictating, effort is made to correct spelling/grammar errors. If there are any questions or concerns please feel free to contact the dictating provider for clarification.      Thu Plascencia DO  ATTENDING, [id number] [place] [address],   03/02/20 5506

## 2020-03-02 NOTE — PROGRESS NOTES
Gets together: Not on file     Attends Nondenominational service: Not on file     Active member of club or organization: Not on file     Attends meetings of clubs or organizations: Not on file     Relationship status: Not on file    Intimate partner violence:     Fear of current or ex partner: Not on file     Emotionally abused: Not on file     Physically abused: Not on file     Forced sexual activity: Not on file   Other Topics Concern    Not on file   Social History Narrative    Not on file       Mr. Abdulaziz Anguiano current medications are   Outpatient Medications Prior to Visit   Medication Sig Dispense Refill    zolpidem (AMBIEN) 5 MG tablet Take 1 tablet by mouth nightly as needed for Sleep for up to 7 doses. 7 tablet 0    amitriptyline (ELAVIL) 25 MG tablet Take 1-2 tablets by mouth nightly as needed for Sleep 60 tablet 0    magnesium oxide (MAG-OX) 400 MG tablet Take one tablet Po BID 60 tablet 0    meloxicam (MOBIC) 15 MG tablet Take 1 tablet by mouth daily as needed for Pain 30 tablet 0    NOVOLOG 100 UNIT/ML injection continuous. Insulin pump averages 50-80 units daily      aspirin 325 MG tablet Take 325 mg by mouth daily.  carvedilol (COREG) 6.25 MG tablet Take 6.25 mg by mouth 2 times daily (with meals).  atorvastatin (LIPITOR) 80 MG tablet Take 80 mg by mouth nightly.  clopidogrel (PLAVIX) 75 MG tablet Take 75 mg by mouth daily.  lisinopril (PRINIVIL;ZESTRIL) 10 MG tablet Take 10 mg by mouth daily. No facility-administered medications prior to visit. REVIEW OF SYSTEMS:   Constitutional: Negative for fatigue and unexpected weight change. Eyes: Negative for visual disturbance. Respiratory: Negative for shortness of breath. Cardiovascular: Negative for chest pain, palpitations  Gastrointestinal: Negative for blood in stool, abdominal distention, nausea, vomiting, abdominal pain, diarrhea,constipation. Skin: Negative for color change or any abnormal bruising.    Neurological: Take 80 mg by mouth nightly.  clopidogrel (PLAVIX) 75 MG tablet Take 75 mg by mouth daily.  lisinopril (PRINIVIL;ZESTRIL) 10 MG tablet Take 10 mg by mouth daily. No current facility-administered medications for this visit. Goals of current treatment regimen include improvement in pain, restoration of functioning- with focus on improvement in physical performance, general activity, work or disability,emotional distress, health care utilization and  decreased medication consumption. Will continue to monitor progress towards achieving/maintaining therapeutic goals with special emphasis on  1. Improvement in perceived interfernce  of pain with ADL's. Ability to do home exercises independently. Ability to do household chores indoor and/or outdoor work and social and leisure activities. To increase flexibility/ROM, strength and endurance. Improve psychosocial and physical functioning.- he is showing progression towards this treatment goal with the current regimen. 2. Improving sleep to 6-7 hours a night. Improve mood/ anxiety and depression symptoms such as crying spells, low energy, problems with concentration, motivation. r.smr   3. Reduction of reliance on opioid analgesia/more appropriate opioid use. - he is showing progression towards this treatment goal with the current regimen. 4. Ability to focus/concentrate at work and perform the duties required of him at work  Sit through a workday without lower extremity symptoms. Stand 30-60 minutes without lower extremity symptoms. Ability to lift up to 10-20 lbs. Ability to go up and down stairs. Sit 30-60 minutes  Without having to stand up frequently. - he is maintaining/progressing towards his work related goals with the current regimen. Risks and benefits of the medications and other alternative treatments have been/were  discussed with the patient. Any questions on the  common side effects of these medications were also answered.   He was

## 2020-03-27 ENCOUNTER — OFFICE VISIT (OUTPATIENT)
Dept: PAIN MANAGEMENT | Age: 55
End: 2020-03-27
Payer: COMMERCIAL

## 2020-03-27 VITALS
DIASTOLIC BLOOD PRESSURE: 85 MMHG | BODY MASS INDEX: 26.54 KG/M2 | WEIGHT: 185 LBS | TEMPERATURE: 97.7 F | SYSTOLIC BLOOD PRESSURE: 138 MMHG | HEART RATE: 63 BPM

## 2020-03-27 PROCEDURE — 99213 OFFICE O/P EST LOW 20 MIN: CPT | Performed by: INTERNAL MEDICINE

## 2020-03-27 RX ORDER — MORPHINE SULFATE 30 MG/1
30 CAPSULE, EXTENDED RELEASE ORAL DAILY
Qty: 28 CAPSULE | Refills: 0 | Status: SHIPPED | OUTPATIENT
Start: 2020-03-27 | End: 2020-04-20 | Stop reason: SDUPTHER

## 2020-03-27 RX ORDER — MELOXICAM 15 MG/1
15 TABLET ORAL DAILY PRN
Qty: 30 TABLET | Refills: 0 | Status: SHIPPED | OUTPATIENT
Start: 2020-03-27 | End: 2020-06-15 | Stop reason: SDUPTHER

## 2020-03-27 RX ORDER — HYDROCODONE BITARTRATE AND ACETAMINOPHEN 7.5; 325 MG/1; MG/1
1 TABLET ORAL EVERY 6 HOURS PRN
Qty: 84 TABLET | Refills: 0 | Status: SHIPPED | OUTPATIENT
Start: 2020-03-27 | End: 2020-04-20 | Stop reason: SDUPTHER

## 2020-03-27 RX ORDER — DULOXETIN HYDROCHLORIDE 30 MG/1
30 CAPSULE, DELAYED RELEASE ORAL DAILY
Qty: 30 CAPSULE | Refills: 0 | Status: SHIPPED | OUTPATIENT
Start: 2020-03-27 | End: 2020-06-15 | Stop reason: SDUPTHER

## 2020-03-27 RX ORDER — QUETIAPINE FUMARATE 25 MG/1
25-50 TABLET, FILM COATED ORAL NIGHTLY
Qty: 60 TABLET | Refills: 0 | Status: SHIPPED | OUTPATIENT
Start: 2020-03-27 | End: 2020-05-18 | Stop reason: SDUPTHER

## 2020-03-27 RX ORDER — MAGNESIUM OXIDE 400 MG/1
TABLET ORAL
Qty: 60 TABLET | Refills: 0 | Status: SHIPPED | OUTPATIENT
Start: 2020-03-27 | End: 2020-12-31 | Stop reason: SDUPTHER

## 2020-03-27 NOTE — PROGRESS NOTES
meloxicam (MOBIC) 15 MG tablet Take 1 tablet by mouth daily as needed for Pain 30 tablet 0    magnesium oxide (MAG-OX) 400 MG tablet Take one tablet Po BID 60 tablet 0    QUEtiapine (SEROQUEL) 25 MG tablet Take 1-2 tablets by mouth nightly 60 tablet 0    DULoxetine (CYMBALTA) 30 MG extended release capsule Take 1 capsule by mouth daily 30 capsule 0    NOVOLOG 100 UNIT/ML injection continuous. Insulin pump averages 50-80 units daily      aspirin 325 MG tablet Take 325 mg by mouth daily.  carvedilol (COREG) 6.25 MG tablet Take 6.25 mg by mouth 2 times daily (with meals).  atorvastatin (LIPITOR) 80 MG tablet Take 80 mg by mouth nightly.  clopidogrel (PLAVIX) 75 MG tablet Take 75 mg by mouth daily.  lisinopril (PRINIVIL;ZESTRIL) 10 MG tablet Take 10 mg by mouth daily. No facility-administered medications prior to visit. SOCIAL/FAMILY/PAST MEDICAL HISTORY: Mr. Shyla London, family and past medical history was reviewed. REVIEW OF SYSTEMS:    Respiratory: Negative for apnea, chest tightness and shortness of breath or change in baseline breathing. Gastrointestinal: Negative for nausea, vomiting, abdominal pain, diarrhea, constipation, blood in stool and abdominal distention. PHYSICAL EXAM:   Nursing note and vitals reviewed. /85   Pulse 63   Temp 97.7 °F (36.5 °C)   Wt 185 lb (83.9 kg)   BMI 26.54 kg/m²   Constitutional: He appears well-developed and well-nourished. No acute distress. Skin: Skin is warm and dry, good turgor. No rash noted. He is not diaphoretic. Cardiovascular: Normal rate, regular rhythm, normal heart sounds, and does not have murmur. Pulmonary/Chest: Effort normal. No respiratory distress. He does not have wheezes in the lung fields. He has no rales. Neurological/Psychiatric:He is alert and oriented to person, place, and time. Coordination is  normal.  His mood isAppropriate and affect is Neutral/Euthymic(normal) .  His    IMPRESSION: 1. Chronic pain syndrome    2. bwc-Prolapsed lumbosacral intervertebral disc    3. BWC Lumbar sprain, initial encounter    4. BWC Sprain of low back, initial encounter    5. Lumbar sprain, initial encounter    6. Sprain of low back, initial encounter    7. BWC Sprain of lumbar region, initial encounter    8. BWC Low back sprain, initial encounter        PLAN:  Informed verbal consent was obtained  -continue with current opioid regimen, Laura 30 mg daily along with Norco 3 per day  -continue with Cymbalta and Seroquel  -back stretching exercises as advised   -Urine drug screen with GC/MS for opiates and drugs of abuse was ordered and will follow up on results. -ORT score is 0     Current Outpatient Medications   Medication Sig Dispense Refill    morphine (LAURA) 30 MG extended release capsule Take 1 capsule by mouth daily for 28 days. 28 capsule 0    HYDROcodone-acetaminophen (NORCO) 7.5-325 MG per tablet Take 1 tablet by mouth every 6 hours as needed for Pain for up to 28 days. MAX 3 PER DAY 84 tablet 0    meloxicam (MOBIC) 15 MG tablet Take 1 tablet by mouth daily as needed for Pain 30 tablet 0    magnesium oxide (MAG-OX) 400 MG tablet Take one tablet Po BID 60 tablet 0    QUEtiapine (SEROQUEL) 25 MG tablet Take 1-2 tablets by mouth nightly 60 tablet 0    DULoxetine (CYMBALTA) 30 MG extended release capsule Take 1 capsule by mouth daily 30 capsule 0    NOVOLOG 100 UNIT/ML injection continuous. Insulin pump averages 50-80 units daily      aspirin 325 MG tablet Take 325 mg by mouth daily.  carvedilol (COREG) 6.25 MG tablet Take 6.25 mg by mouth 2 times daily (with meals).  atorvastatin (LIPITOR) 80 MG tablet Take 80 mg by mouth nightly.  clopidogrel (PLAVIX) 75 MG tablet Take 75 mg by mouth daily.  lisinopril (PRINIVIL;ZESTRIL) 10 MG tablet Take 10 mg by mouth daily. No current facility-administered medications for this visit.       I will continue his current medication regimen terms of it's contents,there may have been some errors made inadvertently. Thank you for allowing me to participate in the care of this patient.     Kyara Thomas MD.    Cc: Henri Crisostomo MD

## 2020-04-20 ENCOUNTER — VIRTUAL VISIT (OUTPATIENT)
Dept: PAIN MANAGEMENT | Age: 55
End: 2020-04-20
Payer: COMMERCIAL

## 2020-04-20 PROCEDURE — 99213 OFFICE O/P EST LOW 20 MIN: CPT | Performed by: INTERNAL MEDICINE

## 2020-04-20 RX ORDER — HYDROCODONE BITARTRATE AND ACETAMINOPHEN 7.5; 325 MG/1; MG/1
1 TABLET ORAL EVERY 6 HOURS PRN
Qty: 84 TABLET | Refills: 0 | Status: SHIPPED | OUTPATIENT
Start: 2020-04-20 | End: 2020-05-18 | Stop reason: SDUPTHER

## 2020-04-20 RX ORDER — MORPHINE SULFATE 30 MG/1
30 CAPSULE, EXTENDED RELEASE ORAL DAILY
Qty: 28 CAPSULE | Refills: 0 | Status: SHIPPED | OUTPATIENT
Start: 2020-04-20 | End: 2020-05-18 | Stop reason: SDUPTHER

## 2020-04-20 NOTE — PROGRESS NOTES
daily.       No facility-administered medications prior to visit. SOCIAL/FAMILY/PAST MEDICAL HISTORY: Mr. Sharlene Barroso, family and past medical history was reviewed. REVIEW OF SYSTEMS:    Respiratory: Negative for apnea, chest tightness and shortness of breath or change in baseline breathing. Gastrointestinal: Negative for nausea, vomiting, abdominal pain, diarrhea, constipation, blood in stool and abdominal distention. PHYSICAL EXAM:   Nursing note and vitals per patient reviewed. There were no vitals taken for this visit. Constitutional: He appears well-developed and well-nourished. No acute distress. No respiratory distress. Skin: Skin appears to be warm and dry. No rashes or any other marks noted. He is not diaphoretic. Neurological/Psychiatric:He is alert and oriented to person, place, and time. Coordination is  normal.  His mood isAppropriate and affect is Neutral/Euthymic(normal). Musculoskeletal / Extremities: Range of motion is normal. Gait is normal, assistive devices use: none. IMPRESSION:   1. bwc-Prolapsed lumbosacral intervertebral disc    2. BWC Lumbar sprain, initial encounter    3. BWC Sprain of low back, initial encounter    4. BWC Sprain of lumbar region, initial encounter    5. BWC Low back sprain, initial encounter        PLAN:  Informed verbal consent regarding treatment was obtained  -continue with current opioid regimen, Ms Contin and Norco 3 per day  -walking/stretching exercises as advised    -Patient's urine drug screen results with GC/MS confirmation were obtained and reviewed and were negative for any illicit drugs. Prescribed medications were within acceptable range. -Monitor blood sugar regularly, diabetic control- adv diabetic diet. Goal for fasting blood sugars around 120.  Follow up with Endocrinologist/PCP also for on going management       Current Outpatient Medications   Medication Sig Dispense Refill    morphine (LAURA) 30 MG extended release

## 2020-05-18 ENCOUNTER — VIRTUAL VISIT (OUTPATIENT)
Dept: PAIN MANAGEMENT | Age: 55
End: 2020-05-18
Payer: COMMERCIAL

## 2020-05-18 PROCEDURE — 99214 OFFICE O/P EST MOD 30 MIN: CPT | Performed by: INTERNAL MEDICINE

## 2020-05-18 RX ORDER — HYDROCODONE BITARTRATE AND ACETAMINOPHEN 7.5; 325 MG/1; MG/1
1 TABLET ORAL EVERY 6 HOURS PRN
Qty: 84 TABLET | Refills: 0 | Status: SHIPPED | OUTPATIENT
Start: 2020-05-18 | End: 2020-06-15 | Stop reason: SDUPTHER

## 2020-05-18 RX ORDER — MORPHINE SULFATE 30 MG/1
30 CAPSULE, EXTENDED RELEASE ORAL DAILY
Qty: 28 CAPSULE | Refills: 0 | Status: SHIPPED | OUTPATIENT
Start: 2020-05-18 | End: 2020-06-15 | Stop reason: SDUPTHER

## 2020-05-18 RX ORDER — QUETIAPINE FUMARATE 25 MG/1
25-50 TABLET, FILM COATED ORAL NIGHTLY
Qty: 60 TABLET | Refills: 0 | Status: SHIPPED | OUTPATIENT
Start: 2020-05-18 | End: 2020-06-15 | Stop reason: SDUPTHER

## 2020-05-18 NOTE — PROGRESS NOTES
is  normal.  His mood isAppropriate and affect is Flat/blunted and Anxious. His behavior is normal.   thought content normal.   Musculoskeletal / Extremities: Range of motion is normal. Gait is normal, assistive devices use: none. IMPRESSION:   1. Chronic pain syndrome    2. bwc-Prolapsed lumbosacral intervertebral disc    3. BWC Lumbar sprain, initial encounter    4. BWC Sprain of low back, initial encounter    5. Lumbar sprain, initial encounter    6. Sprain of low back, initial encounter    7. BWC Sprain of lumbar region, initial encounter    8. BWC Low back sprain, initial encounter        PLAN:  Informed verbal consent regarding treatment was obtained  -Continue with current opioid regimen Laura with [facility] for btp   -Adv Biofeedback, relaxation and meditation techniques. Referral to psychologist for CBT was also discussed with patient   -He was advised to increase fluids ( 5-7  glasses of fluid daily), limit caffeine, avoid cheese products, increase dietary fiber, increase activity and exercise as tolerated and relax regularly and enjoy meals   -Walking/Stretching exercises as advised   -he was advised proper sleep hygiene-told to avoid:use of caffeine or other stimulants after noon, alcohol use near bedtime, long or frequent naps during the day, erratic sleep schedule, heavy meals near bedtime, vigorous exercise near bedtime and use of electronic devices near bedtime   -Continue with Seroquel   -Patient's urine drug screen results with GC/MS confirmation were obtained and reviewed and were negative for any illicit drugs. Prescribed medications were within acceptable range.    -Advised caffeine reduction, dietary changes, elevate head end of bed, NPO after supper, if using alcohol advised reduction of alcohol intake, tobacco cessation if smoking, weight loss   -Continue with Mobic as needed    Current Outpatient Medications   Medication Sig Dispense Refill    morphine (LAURA) 30 MG extended release

## 2020-06-15 ENCOUNTER — VIRTUAL VISIT (OUTPATIENT)
Dept: PAIN MANAGEMENT | Age: 55
End: 2020-06-15
Payer: COMMERCIAL

## 2020-06-15 PROCEDURE — 99213 OFFICE O/P EST LOW 20 MIN: CPT | Performed by: INTERNAL MEDICINE

## 2020-06-15 RX ORDER — DULOXETIN HYDROCHLORIDE 30 MG/1
30 CAPSULE, DELAYED RELEASE ORAL DAILY
Qty: 30 CAPSULE | Refills: 0 | Status: SHIPPED | OUTPATIENT
Start: 2020-06-15 | End: 2020-12-31 | Stop reason: SDUPTHER

## 2020-06-15 RX ORDER — HYDROCODONE BITARTRATE AND ACETAMINOPHEN 7.5; 325 MG/1; MG/1
1 TABLET ORAL EVERY 6 HOURS PRN
Qty: 84 TABLET | Refills: 0 | Status: SHIPPED | OUTPATIENT
Start: 2020-06-15 | End: 2020-07-13 | Stop reason: SDUPTHER

## 2020-06-15 RX ORDER — MELOXICAM 15 MG/1
15 TABLET ORAL DAILY PRN
Qty: 30 TABLET | Refills: 0 | Status: SHIPPED | OUTPATIENT
Start: 2020-06-15 | End: 2020-12-31 | Stop reason: SDUPTHER

## 2020-06-15 RX ORDER — MORPHINE SULFATE 30 MG/1
30 CAPSULE, EXTENDED RELEASE ORAL DAILY
Qty: 28 CAPSULE | Refills: 0 | Status: SHIPPED | OUTPATIENT
Start: 2020-06-15 | End: 2020-07-13 | Stop reason: SDUPTHER

## 2020-06-15 RX ORDER — QUETIAPINE FUMARATE 25 MG/1
25-50 TABLET, FILM COATED ORAL NIGHTLY
Qty: 60 TABLET | Refills: 0 | Status: SHIPPED | OUTPATIENT
Start: 2020-06-15 | End: 2020-08-10 | Stop reason: SDUPTHER

## 2020-06-15 NOTE — PROGRESS NOTES
mouth daily. No facility-administered medications prior to visit. SOCIAL/FAMILY/PAST MEDICAL HISTORY: Mr. Ancelmo Domínguez, family and past medical history was reviewed. REVIEW OF SYSTEMS:    Respiratory: Negative for apnea, chest tightness and shortness of breath or change in baseline breathing. Gastrointestinal: Negative for nausea, vomiting, abdominal pain, diarrhea, constipation, blood in stool and abdominal distention. PHYSICAL EXAM:   Nursing note and vitals per patient reviewed. There were no vitals taken for this visit. Constitutional: He appears well-developed and well-nourished. No acute distress. No respiratory distress. Skin: Skin appears to be warm and dry. No rashes or any other marks noted. He is not diaphoretic. Respiratory/Pulmonary: NO conversational dyspnea, no accessory muscle use, no coughing during exam. He does not appear to be in labored breathing. Neurological/Psychiatric:He is alert and oriented to person, place, and time. Coordination is  normal.  His mood isAppropriate and affect is Neutral/Euthymic(normal). Musculoskeletal / Extremities: Range of motion is normal. Gait is normal, assistive devices use: none. IMPRESSION:   1. bwc-Prolapsed lumbosacral intervertebral disc    2. BWC Lumbar sprain, initial encounter    3. BWC Sprain of low back, initial encounter    4. BWC Sprain of lumbar region, initial encounter    5. BWC Low back sprain, initial encounter        PLAN:  Informed verbal consent regarding treatment was obtained  -[place] record was obtained and reviewed  for the last one year and no indicators of drug misuse  were found. Any other controlled substance prescriptions  seen on the record have been accounted for, I am aware of the patient receiving these medications. Yusra Martinez  [place] record will be rechecked as part of office protocol.    -Walking/Stretching exercises as advised   -he was advised proper sleep hygiene-told to avoid:use of caffeine or other stimulants after noon, alcohol use near bedtime, long or frequent naps during the day, erratic sleep schedule, heavy meals near bedtime, vigorous exercise near bedtime and use of electronic devices near bedtime   -He was advised to increase fluids ( 5-7  glasses of fluid daily), limit caffeine, avoid cheese products, increase dietary fiber, increase activity and exercise as tolerated and relax regularly and enjoy meals   -Continue with Mobic   -Restart Cymbalta, has not been taking them. Current Outpatient Medications   Medication Sig Dispense Refill    morphine (LAURA) 30 MG extended release capsule Take 1 capsule by mouth daily for 28 days. 28 capsule 0    HYDROcodone-acetaminophen (NORCO) 7.5-325 MG per tablet Take 1 tablet by mouth every 6 hours as needed for Pain for up to 28 days. MAX 3 PER DAY 84 tablet 0    QUEtiapine (SEROQUEL) 25 MG tablet Take 1-2 tablets by mouth nightly 60 tablet 0    meloxicam (MOBIC) 15 MG tablet Take 1 tablet by mouth daily as needed for Pain 30 tablet 0    magnesium oxide (MAG-OX) 400 MG tablet Take one tablet Po BID 60 tablet 0    DULoxetine (CYMBALTA) 30 MG extended release capsule Take 1 capsule by mouth daily 30 capsule 0    NOVOLOG 100 UNIT/ML injection continuous. Insulin pump averages 50-80 units daily      aspirin 325 MG tablet Take 325 mg by mouth daily.  carvedilol (COREG) 6.25 MG tablet Take 6.25 mg by mouth 2 times daily (with meals).  atorvastatin (LIPITOR) 80 MG tablet Take 80 mg by mouth nightly.  clopidogrel (PLAVIX) 75 MG tablet Take 75 mg by mouth daily.  lisinopril (PRINIVIL;ZESTRIL) 10 MG tablet Take 10 mg by mouth daily. No current facility-administered medications for this visit. I will continue his current medication regimen  which is part of the above treatment schedule.  It has been helping with Mr. Caden Lo chronic  medical problems which for this visit include:   Diagnoses of bwc-Prolapsed lumbosacral intervertebral

## 2020-07-13 ENCOUNTER — VIRTUAL VISIT (OUTPATIENT)
Dept: PAIN MANAGEMENT | Age: 55
End: 2020-07-13
Payer: COMMERCIAL

## 2020-07-13 PROCEDURE — 99213 OFFICE O/P EST LOW 20 MIN: CPT | Performed by: INTERNAL MEDICINE

## 2020-07-13 RX ORDER — HYDROCODONE BITARTRATE AND ACETAMINOPHEN 7.5; 325 MG/1; MG/1
1 TABLET ORAL EVERY 6 HOURS PRN
Qty: 84 TABLET | Refills: 0 | Status: SHIPPED | OUTPATIENT
Start: 2020-07-13 | End: 2020-08-10 | Stop reason: SDUPTHER

## 2020-07-13 RX ORDER — MORPHINE SULFATE 30 MG/1
30 CAPSULE, EXTENDED RELEASE ORAL DAILY
Qty: 28 CAPSULE | Refills: 0 | Status: SHIPPED | OUTPATIENT
Start: 2020-07-13 | End: 2020-08-10 | Stop reason: SDUPTHER

## 2020-07-13 NOTE — PROGRESS NOTES
since the last follow up visit the physical functioning is worse, family/social relationships are unchanged, mood is unchanged sleep patterns are unchanged, and that the overall functioning is worse. Patient denies misusing/abusing his narcotic pain medications or using any illegal drugs. There are No indicators for possible drug abuse, addiction or diversion problems. Patient complains he has been feeling sore the last few days.  says he is working full time still. He mentions he is using Laura and Norco for BTP. He denies any constipation symptoms. He reports he is using all of the other adjuvants. He states he is using Mobic as needed     ALLERGIES: Patients list of allergies were reviewed     MEDICATIONS: Mr. Amairani Flannery list of medications were reviewed. His current medications are   Outpatient Medications Prior to Visit   Medication Sig Dispense Refill    morphine (LAURA) 30 MG extended release capsule Take 1 capsule by mouth daily for 28 days. 28 capsule 0    HYDROcodone-acetaminophen (NORCO) 7.5-325 MG per tablet Take 1 tablet by mouth every 6 hours as needed for Pain for up to 28 days. MAX 3 PER DAY 84 tablet 0    QUEtiapine (SEROQUEL) 25 MG tablet Take 1-2 tablets by mouth nightly 60 tablet 0    meloxicam (MOBIC) 15 MG tablet Take 1 tablet by mouth daily as needed for Pain 30 tablet 0    DULoxetine (CYMBALTA) 30 MG extended release capsule Take 1 capsule by mouth daily 30 capsule 0    magnesium oxide (MAG-OX) 400 MG tablet Take one tablet Po BID 60 tablet 0    NOVOLOG 100 UNIT/ML injection continuous. Insulin pump averages 50-80 units daily      aspirin 325 MG tablet Take 325 mg by mouth daily.  carvedilol (COREG) 6.25 MG tablet Take 6.25 mg by mouth 2 times daily (with meals).  atorvastatin (LIPITOR) 80 MG tablet Take 80 mg by mouth nightly.  clopidogrel (PLAVIX) 75 MG tablet Take 75 mg by mouth daily.  lisinopril (PRINIVIL;ZESTRIL) 10 MG tablet Take 10 mg by mouth daily. Current Outpatient Medications   Medication Sig Dispense Refill    morphine (LAURA) 30 MG extended release capsule Take 1 capsule by mouth daily for 28 days. 28 capsule 0    HYDROcodone-acetaminophen (NORCO) 7.5-325 MG per tablet Take 1 tablet by mouth every 6 hours as needed for Pain for up to 28 days. MAX 3 PER DAY 84 tablet 0    QUEtiapine (SEROQUEL) 25 MG tablet Take 1-2 tablets by mouth nightly 60 tablet 0    meloxicam (MOBIC) 15 MG tablet Take 1 tablet by mouth daily as needed for Pain 30 tablet 0    DULoxetine (CYMBALTA) 30 MG extended release capsule Take 1 capsule by mouth daily 30 capsule 0    magnesium oxide (MAG-OX) 400 MG tablet Take one tablet Po BID 60 tablet 0    NOVOLOG 100 UNIT/ML injection continuous. Insulin pump averages 50-80 units daily      aspirin 325 MG tablet Take 325 mg by mouth daily.  carvedilol (COREG) 6.25 MG tablet Take 6.25 mg by mouth 2 times daily (with meals).  atorvastatin (LIPITOR) 80 MG tablet Take 80 mg by mouth nightly.  clopidogrel (PLAVIX) 75 MG tablet Take 75 mg by mouth daily.  lisinopril (PRINIVIL;ZESTRIL) 10 MG tablet Take 10 mg by mouth daily. No current facility-administered medications for this visit. I will continue his current medication regimen  which is part of the above treatment schedule. It has been helping with Mr. Felicity Meza chronic  medical problems which for this visit include:   Diagnoses of bwc-Prolapsed lumbosacral intervertebral disc, BWC Lumbar sprain, initial encounter, BWC Sprain of low back, initial encounter, BWC Sprain of lumbar region, initial encounter, and BWC Low back sprain, initial encounter were pertinent to this visit. Risks and benefits of the medications and other alternative treatments  including no treatment were discussed with the patient. The common side effects of these medications were also explained to the patient. Informed verbal consent was obtained.    Goals of current treatment regimen include improvement in pain, restoration of functioning- with focus on improvement in physical performance, general activity, work or disability,emotional distress, health care utilization and  decreased medication consumption. Will continue to monitor progress towards achieving/maintaining therapeutic goals with special emphasis on  1. Improvement in perceived interfernce  of pain with ADL's. Ability to do home exercises independently. Ability to do household chores indoor and/or outdoor work and social and leisure activities. Improve psychosocial and physical functioning. - he is showing progression towards this treatment goal with the current regimen. He was advised against drinking alcohol with the narcotic pain medicines, advised against driving or handling machinery while adjusting the dose of medicines or if having cognitive  issues related to the current medications. Risk of overdose and death, if medicines not taken as prescribed, were also discussed. If the patient develops new symptoms or if the symptoms worsen, the patient should call the office. While transcribing every attempt was made to maintain the accuracy of the note in terms of it's contents,there may have been some errors made inadvertently. Thank you for allowing me to participate in the care of this patient.     Mendez Bonilla MD.    Cc: Amaya Navarro MD

## 2020-08-10 ENCOUNTER — VIRTUAL VISIT (OUTPATIENT)
Dept: PAIN MANAGEMENT | Age: 55
End: 2020-08-10
Payer: COMMERCIAL

## 2020-08-10 PROCEDURE — 99213 OFFICE O/P EST LOW 20 MIN: CPT | Performed by: INTERNAL MEDICINE

## 2020-08-10 RX ORDER — MORPHINE SULFATE 30 MG/1
30 CAPSULE, EXTENDED RELEASE ORAL DAILY
Qty: 30 CAPSULE | Refills: 0 | Status: SHIPPED | OUTPATIENT
Start: 2020-08-10 | End: 2020-09-10 | Stop reason: SDUPTHER

## 2020-08-10 RX ORDER — HYDROCODONE BITARTRATE AND ACETAMINOPHEN 7.5; 325 MG/1; MG/1
1 TABLET ORAL EVERY 6 HOURS PRN
Qty: 90 TABLET | Refills: 0 | Status: SHIPPED | OUTPATIENT
Start: 2020-08-10 | End: 2020-09-10 | Stop reason: SDUPTHER

## 2020-08-10 RX ORDER — QUETIAPINE FUMARATE 25 MG/1
25-50 TABLET, FILM COATED ORAL NIGHTLY
Qty: 60 TABLET | Refills: 1 | Status: SHIPPED | OUTPATIENT
Start: 2020-08-10 | End: 2020-11-05 | Stop reason: SDUPTHER

## 2020-08-10 NOTE — PROGRESS NOTES
TELE HEALTH VISIT (AUDIO-VISUAL)    Pursuant to the emergency declaration under the [facility] waiver authority and the [employer] and [employer] Act, this Virtual  Visit was conducted, with patient's/legal guardian's consent, to reduce the patient's risk of exposure to COVID-19 and provide continuity of care for an established patient. Service is  provided through a video synchronous discussion virtually to substitute for in-person clinic visit. Due to this being a TeleHealth encounter (During NIUYW-04 public health emergency), evaluation of the following organ systems was limited: Vitals/Constitutional/EENT/Resp/CV/GI//MS/Neuro/Skin/Agtp-Gmiv-Iva. Arno Code  1965  [id number]    Mr. Ac Ceballos is being seen virtually for a follow up visit using one of the following techniques  Google Duo  Informed verbal consent to the virtual visit was obtained from Mr. Ac Ceballos. Risks associated with HIPPA compliance with the virtual visit was explained to the patient. Mr. Ac Ceballos is at his residence and Dr. Britta Dick is in his office. HISTORY OF PRESENT ILLNESS:  Mr. Ac Ceballos is a 60 y.o. male  being assessed for a follow up visit for pain management for evaluation of ongoing care regarding his symptoms and monitoring of compliance with long term use high risk medications. He has a diagnosis of   1. bwc-Prolapsed lumbosacral intervertebral disc    2. BWC Lumbar sprain, initial encounter    3. BWC Sprain of low back, initial encounter    4. BWC Sprain of lumbar region, initial encounter    5. BWC Low back sprain, initial encounter    . He complains of pain in the lower back  with radiation to the buttocks and hips Bilateral . He rates the pain 7/10 and describes it as aching, burning. Current treatment regimen has helped relieve about 60% of the pain. He denies any side effects from the current pain regimen.  Patient reports that since the last follow up visit the physical functioning is unchanged, family/social relationships are unchanged, mood is unchanged sleep patterns are unchanged, and that the overall functioning is unchanged. Patient denies misusing/abusing his narcotic pain medications or using any illegal drugs. There are No indicators for possible drug abuse, addiction or diversion problems. Mr. Vivas states he has been doing fair, pain has been tolerable with the medications. He states he is using Laura along with Norco for BTP. Patient says he is using Seroquel which is helping with sleep. Patient denies any constipation symptoms. ALLERGIES: Patients list of allergies were reviewed     MEDICATIONS: Mr. Vivas list of medications were reviewed. His current medications are   Outpatient Medications Prior to Visit   Medication Sig Dispense Refill    morphine (LAURA) 30 MG extended release capsule Take 1 capsule by mouth daily for 28 days. 28 capsule 0    HYDROcodone-acetaminophen (NORCO) 7.5-325 MG per tablet Take 1 tablet by mouth every 6 hours as needed for Pain for up to 28 days. MAX 3 PER DAY 84 tablet 0    QUEtiapine (SEROQUEL) 25 MG tablet Take 1-2 tablets by mouth nightly 60 tablet 0    meloxicam (MOBIC) 15 MG tablet Take 1 tablet by mouth daily as needed for Pain 30 tablet 0    DULoxetine (CYMBALTA) 30 MG extended release capsule Take 1 capsule by mouth daily 30 capsule 0    magnesium oxide (MAG-OX) 400 MG tablet Take one tablet Po BID 60 tablet 0    NOVOLOG 100 UNIT/ML injection continuous. Insulin pump averages 50-80 units daily      aspirin 325 MG tablet Take 325 mg by mouth daily.  carvedilol (COREG) 6.25 MG tablet Take 6.25 mg by mouth 2 times daily (with meals).  atorvastatin (LIPITOR) 80 MG tablet Take 80 mg by mouth nightly.  clopidogrel (PLAVIX) 75 MG tablet Take 75 mg by mouth daily.  lisinopril (PRINIVIL;ZESTRIL) 10 MG tablet Take 10 mg by mouth daily.        No facility-administered medications prior to visit. SOCIAL/FAMILY/PAST MEDICAL HISTORY: Mr. Konrad Howell, family and past medical history was reviewed. REVIEW OF SYSTEMS:    Respiratory: Negative for apnea, chest tightness and shortness of breath or change in baseline breathing. Gastrointestinal: Negative for nausea, vomiting, abdominal pain, diarrhea, constipation, blood in stool and abdominal distention. PHYSICAL EXAM:   Nursing note and vitals per patient reviewed. There were no vitals taken for this visit. Constitutional: He appears well-developed and well-nourished. No acute distress. No respiratory distress. Skin: Skin appears to be warm and dry. No rashes or any other marks noted. He is not diaphoretic. Respiratory/Pulmonary: NO conversational dyspnea, no accessory muscle use, no coughing during exam. He does not appear to be in labored breathing. Neurological/Psychiatric:He is alert and oriented to person, place, and time. Coordination is  normal.  His mood isAppropriate and affect is Neutral/Euthymic(normal). Musculoskeletal / Extremities: Range of motion is normal. Gait is normal, assistive devices use: none. IMPRESSION:   1. bwc-Prolapsed lumbosacral intervertebral disc    2. BWC Lumbar sprain, initial encounter    3. BWC Sprain of low back, initial encounter    4. BWC Sprain of lumbar region, initial encounter    5. BWC Low back sprain, initial encounter    6. Chronic pain syndrome    7. Lumbar sprain, initial encounter    8.  Sprain of low back, initial encounter        PLAN:  Informed verbal consent regarding treatment was obtained  -continue with current opioid regimen Jakcie along with Norco for BTP  -He was advised to increase fluids ( 5-7  glasses of fluid daily), limit caffeine, avoid cheese products, increase dietary fiber, increase activity and exercise as tolerated and relax regularly and enjoy meals   -walking/stretching exercises as advised    -he was advised proper sleep hygiene-told to avoid:use of caffeine or other stimulants after noon, alcohol use near bedtime, long or frequent naps during the day, erratic sleep schedule, heavy meals near bedtime, vigorous exercise near bedtime and use of electronic devices near bedtime. Continue with Seroquel      Current Outpatient Medications   Medication Sig Dispense Refill    morphine (LAURA) 30 MG extended release capsule Take 1 capsule by mouth daily for 28 days. 28 capsule 0    HYDROcodone-acetaminophen (NORCO) 7.5-325 MG per tablet Take 1 tablet by mouth every 6 hours as needed for Pain for up to 28 days. MAX 3 PER DAY 84 tablet 0    QUEtiapine (SEROQUEL) 25 MG tablet Take 1-2 tablets by mouth nightly 60 tablet 0    meloxicam (MOBIC) 15 MG tablet Take 1 tablet by mouth daily as needed for Pain 30 tablet 0    DULoxetine (CYMBALTA) 30 MG extended release capsule Take 1 capsule by mouth daily 30 capsule 0    magnesium oxide (MAG-OX) 400 MG tablet Take one tablet Po BID 60 tablet 0    NOVOLOG 100 UNIT/ML injection continuous. Insulin pump averages 50-80 units daily      aspirin 325 MG tablet Take 325 mg by mouth daily.  carvedilol (COREG) 6.25 MG tablet Take 6.25 mg by mouth 2 times daily (with meals).  atorvastatin (LIPITOR) 80 MG tablet Take 80 mg by mouth nightly.  clopidogrel (PLAVIX) 75 MG tablet Take 75 mg by mouth daily.  lisinopril (PRINIVIL;ZESTRIL) 10 MG tablet Take 10 mg by mouth daily. No current facility-administered medications for this visit. I will continue his current medication regimen  which is part of the above treatment schedule. It has been helping with Mr. Nikki Mckinney chronic  medical problems which for this visit include:   Diagnoses of bwc-Prolapsed lumbosacral intervertebral disc, BWC Lumbar sprain, initial encounter, BWC Sprain of low back, initial encounter, BWC Sprain of lumbar region, initial encounter, and BWC Low back sprain, initial encounter were pertinent to this visit.    Risks and benefits of the medications and other alternative treatments  including no treatment were discussed with the patient. The common side effects of these medications were also explained to the patient. Informed verbal consent was obtained. Goals of current treatment regimen include improvement in pain, restoration of functioning- with focus on improvement in physical performance, general activity, work or disability,emotional distress, health care utilization and  decreased medication consumption. Will continue to monitor progress towards achieving/maintaining therapeutic goals with special emphasis on  1. Improvement in perceived interfernce  of pain with ADL's. Ability to do home exercises independently. Ability to do household chores indoor and/or outdoor work and social and leisure activities. Improve psychosocial and physical functioning. - he is showing progression towards this treatment goal with the current regimen. He was advised against drinking alcohol with the narcotic pain medicines, advised against driving or handling machinery while adjusting the dose of medicines or if having cognitive  issues related to the current medications. Risk of overdose and death, if medicines not taken as prescribed, were also discussed. If the patient develops new symptoms or if the symptoms worsen, the patient should call the office. While transcribing every attempt was made to maintain the accuracy of the note in terms of it's contents,there may have been some errors made inadvertently. Thank you for allowing me to participate in the care of this patient.     Dianna Eaton MD.    Cc: Jace Hinds MD

## 2020-09-10 ENCOUNTER — VIRTUAL VISIT (OUTPATIENT)
Dept: PAIN MANAGEMENT | Age: 55
End: 2020-09-10
Payer: COMMERCIAL

## 2020-09-10 PROCEDURE — 99214 OFFICE O/P EST MOD 30 MIN: CPT | Performed by: INTERNAL MEDICINE

## 2020-09-10 RX ORDER — HYDROCODONE BITARTRATE AND ACETAMINOPHEN 7.5; 325 MG/1; MG/1
1 TABLET ORAL EVERY 6 HOURS PRN
Qty: 84 TABLET | Refills: 0 | Status: SHIPPED | OUTPATIENT
Start: 2020-09-10 | End: 2020-10-08 | Stop reason: SDUPTHER

## 2020-09-10 RX ORDER — MORPHINE SULFATE 30 MG/1
30 CAPSULE, EXTENDED RELEASE ORAL DAILY
Qty: 28 CAPSULE | Refills: 0 | Status: SHIPPED | OUTPATIENT
Start: 2020-09-10 | End: 2020-10-08 | Stop reason: SDUPTHER

## 2020-09-10 NOTE — PROGRESS NOTES
TELE HEALTH VISIT (AUDIO-VISUAL)    Pursuant to the emergency declaration under the [facility] waiver authority and the [employer] Act, this Virtual  Visit was conducted, with patient's/legal guardian's consent, to reduce the patient's risk of exposure to COVID-19 and provide continuity of care for an established patient. Service is  provided through a video synchronous discussion virtually to substitute for in-person clinic visit. Due to this being a TeleHealth encounter ([facility]), evaluation of the following organ systems was limited: Vitals/Constitutional/EENT/Resp/CV/GI//MS/Neuro/Skin/Opic-Vtam-Dui. Cesar Bolaons  1965  [id number]    Mr. Dudley Mahan is being seen virtually for a follow up visit using one of the following techniques   Google Duo  Informed verbal consent to the virtual visit was obtained from Mr. Dudley Mahan. Risks associated with HIPPA compliance with the virtual visit was explained to the patient. Mr. Dudley Mahan is at his residence and Dr. German Fragoso is in his office. HISTORY OF PRESENT ILLNESS:  Mr. Dudley Mahan is a 60 y.o. male  being assessed for a follow up visit for pain management for evaluation of ongoing care regarding his symptoms and monitoring of compliance with long term use high risk medications. He has a diagnosis of   1. bwc-Prolapsed lumbosacral intervertebral disc    2. BWC Lumbar sprain, initial encounter    . As per information/history obtained from the PADT(patient assessment and documentation tool) -  He complains of pain in the lower back with radiation to the buttocks, hips Bilateral, upper leg Bilateral, knees Bilateral and lower leg Bilateral He rates the pain 6/10 and describes it as sharp, aching. Pain is made worse by: movement, walking, standing, sitting, bending, lifting. Current treatment regimen has helped relieve about 60% of the pain.   He denies side effects from the current pain regimen. Patient reports that since the last follow up visit the physical functioning is unchanged, family/social relationships are unchanged, mood is unchanged and sleep patterns are unchanged, and that the overall functioning is unchanged. Patient denies neurological bowel or bladder. Patient denies misusing/abusing his narcotic pain medications or using any illegal drugs. There are No indicators for possible drug abuse, addiction or diversion problems.  reports he has been doing fair, working full time. He states his medications is helping with the pain. He complains his back and legs hurt. He says he gets occasional flare ups. He mentions he is using Seroquel,which is helping with sleep. Patient states he sleeps well. Has normal sleep latency. Averages about 5-7 hours of sleep a night. Denies any signs of sleep apnea. Feels rested in the AM. Denies any sleep attacks during the day. Medication regimen helps with maintaining/regulating sleep. Patient's  subjective report of his mood is fair. he describes occasional symptoms of depression, occasional  irritability and some mood swings. Describes his mood as being neutral and reports some pleasure in his daily activities. Reports  fair  appetite, energy and concentration. Able to function well in different aspects of his daily activities. Denies suicidal or homicidal ideation. Denies any complaints of increased tension, does   Worry sometimes and occasional  irritability  he denies any c/o increased anxiety, No c/o panic attacks or symptoms of PTSD. He states he is using Cymbalta. He denies any GERD symptoms. He reports he is using Jackie along with Norco. He denies any constipation symptoms. ALLERGIES/PAST MED/FAM/SOC HISTORY: Mr. Phyllis Farooq allergies, past medical, family and social history were reviewed in the chart and also listed below.   Social History     Socioeconomic History    Marital status:  nightly.  clopidogrel (PLAVIX) 75 MG tablet Take 75 mg by mouth daily.  lisinopril (PRINIVIL;ZESTRIL) 10 MG tablet Take 10 mg by mouth daily. No current facility-administered medications for this visit. I will continue his current medication regimen  which is part of the above treatment schedule. It has been helping with Mr. Verna Demarco chronic  medical problems which for this visit include:   Diagnoses of bwc-Prolapsed lumbosacral intervertebral disc and BWC Lumbar sprain, initial encounter were pertinent to this visit. Risks and benefits of the medications and other alternative treatments  including no treatment were discussed with the patient. The common side effects of these medications were also explained to the patient. Informed verbal consent was obtained. Goals of current treatment regimen include improvement in pain, restoration of functioning- with focus on improvement in physical performance, general activity, work or disability,emotional distress, health care utilization and  decreased medication consumption. Will continue to monitor progress towards achieving/maintaining therapeutic goals with special emphasis on  1. Improvement in perceived interfernce  of pain with ADL's. Ability to do home exercises independently. Ability to do household chores indoor and/or outdoor work and social and leisure activities. Improve psychosocial and physical functioning. - he is showing progression towards this treatment goal with the current regimen. 2. Improving sleep to 6-7 hours a night. Improve mood/ anxiety and depression symptoms such as crying spells, low energy, problems with concentration, motivation.- he is showing progression towards this treatment goal with the current regimen. 3. Reduction of reliance on opioid analgesia/more appropriate opioid use. - he is showing progression towards this treatment goal with the current regimen.    4. Ability to focus/concentrate at work and perform

## 2020-10-08 ENCOUNTER — VIRTUAL VISIT (OUTPATIENT)
Dept: PAIN MANAGEMENT | Age: 55
End: 2020-10-08
Payer: COMMERCIAL

## 2020-10-08 PROCEDURE — 99213 OFFICE O/P EST LOW 20 MIN: CPT | Performed by: INTERNAL MEDICINE

## 2020-10-08 RX ORDER — HYDROCODONE BITARTRATE AND ACETAMINOPHEN 7.5; 325 MG/1; MG/1
1 TABLET ORAL EVERY 6 HOURS PRN
Qty: 84 TABLET | Refills: 0 | Status: SHIPPED | OUTPATIENT
Start: 2020-10-08 | End: 2020-11-05 | Stop reason: SDUPTHER

## 2020-10-08 RX ORDER — MORPHINE SULFATE 30 MG/1
30 CAPSULE, EXTENDED RELEASE ORAL DAILY
Qty: 28 CAPSULE | Refills: 0 | Status: SHIPPED | OUTPATIENT
Start: 2020-10-08 | End: 2020-11-05 | Stop reason: SDUPTHER

## 2020-10-08 NOTE — PROGRESS NOTES
TELE HEALTH VISIT (AUDIO-VISUAL)    Pursuant to the emergency declaration under the [facility] waiver authority and the "[employer] and [employer] Act, this Virtual  Visit was conducted, with patient's/legal guardian's consent, to reduce the patient's risk of exposure to COVID-19 and provide continuity of care for an established patient. Service is  provided through a video synchronous discussion virtually to substitute for in-person clinic visit. Due to this being a TeleHealth encounter (During YFKJD-97 public health emergency), evaluation of the following organ systems was limited: Vitals/Constitutional/EENT/Resp/CV/GI//MS/Neuro/Skin/Kdos-Eeof-Yom. Jose Sarah  1965  [id number]    Mr. Elena Fowler is being seen virtually for a follow up visit using one of the following techniques  Google Duo  Informed verbal consent to the virtual visit was obtained from Mr. Elena Fowler. Risks associated with HIPPA compliance with the virtual visit was explained to the patient. Mr. Elena Fowler is at his residence and Dr. Hermilo Land is in his office. HISTORY OF PRESENT ILLNESS:  Mr. Elena Fowler is a 60 y.o. male  being assessed for a follow up visit for pain management for evaluation of ongoing care regarding his symptoms and monitoring of compliance with long term use high risk medications. He has a diagnosis of   1. bwc-Prolapsed lumbosacral intervertebral disc    2. BWC Lumbar sprain, initial encounter    3. BWC Low back sprain, initial encounter    4. BWC Sprain of lumbar region, initial encounter    5. BWC Sprain of low back, initial encounter    . He complains of pain in the Low back   with radiation to the buttocks and hips Bilateral . He rates the pain 6/10 and describes it as sharp, aching. Current treatment regimen has helped relieve about 60% of the pain. He denies any side effects from the current pain regimen.  Patient reports that since the last follow up visit the physical functioning is unchanged, family/social relationships are unchanged, mood is unchanged sleep patterns are unchanged, and that the overall functioning is unchanged. Patient denies misusing/abusing his narcotic pain medications or using any illegal drugs. There are No indicators for possible drug abuse, addiction or diversion problems. Patient states he has been doing fair, still working full time. He denies any constipation symptoms. He mentions he is using Laura along with Norco for BTP, along with the other adjuvants. ALLERGIES: Patients list of allergies were reviewed     MEDICATIONS: Mr. Diana Beckham list of medications were reviewed. His current medications are   Outpatient Medications Prior to Visit   Medication Sig Dispense Refill    morphine (LAURA) 30 MG extended release capsule Take 1 capsule by mouth daily for 28 days. 28 capsule 0    HYDROcodone-acetaminophen (NORCO) 7.5-325 MG per tablet Take 1 tablet by mouth every 6 hours as needed for Pain (max 3 per day) for up to 28 days. 84 tablet 0    QUEtiapine (SEROQUEL) 25 MG tablet Take 1-2 tablets by mouth nightly 60 tablet 1    meloxicam (MOBIC) 15 MG tablet Take 1 tablet by mouth daily as needed for Pain 30 tablet 0    DULoxetine (CYMBALTA) 30 MG extended release capsule Take 1 capsule by mouth daily 30 capsule 0    magnesium oxide (MAG-OX) 400 MG tablet Take one tablet Po BID 60 tablet 0    NOVOLOG 100 UNIT/ML injection continuous. Insulin pump averages 50-80 units daily      aspirin 325 MG tablet Take 325 mg by mouth daily.  carvedilol (COREG) 6.25 MG tablet Take 6.25 mg by mouth 2 times daily (with meals).  atorvastatin (LIPITOR) 80 MG tablet Take 80 mg by mouth nightly.  clopidogrel (PLAVIX) 75 MG tablet Take 75 mg by mouth daily.  lisinopril (PRINIVIL;ZESTRIL) 10 MG tablet Take 10 mg by mouth daily. No facility-administered medications prior to visit.         SOCIAL/FAMILY/PAST MEDICAL HISTORY: Mr. Jose Rene, family and past medical history was reviewed. REVIEW OF SYSTEMS:    Respiratory: Negative for apnea, chest tightness and shortness of breath or change in baseline breathing. Gastrointestinal: Negative for nausea, vomiting, abdominal pain, diarrhea, constipation, blood in stool and abdominal distention. PHYSICAL EXAM:   Nursing note and vitals per patient reviewed. There were no vitals taken for this visit. Constitutional: He appears well-developed and well-nourished. No acute distress. No respiratory distress. Skin: Skin appears to be warm and dry. No rashes or any other marks noted. He is not diaphoretic. Respiratory/Pulmonary: NO conversational dyspnea, no accessory muscle use, no coughing during exam. He does not appear to be in labored breathing. Neurological/Psychiatric:He is alert and oriented to person, place, and time. Coordination is  normal.  His mood isAppropriate and affect is Neutral/Euthymic(normal). Musculoskeletal / Extremities: Range of motion is normal. Gait is normal, assistive devices use: none. IMPRESSION:   1. bwc-Prolapsed lumbosacral intervertebral disc    2. BWC Lumbar sprain, initial encounter    3. BWC Low back sprain, initial encounter    4. BWC Sprain of lumbar region, initial encounter    5. BWC Sprain of low back, initial encounter        PLAN:  Informed verbal consent regarding treatment was obtained  -[place] was obtained and reviewed  for the last one year and no indicators of drug misuse  were found. Any other controlled substance prescriptions  seen on the record have been accounted for, I am aware of the patient receiving these medications. Jolly Odom [place] record will be rechecked as part of office protocol.    -Continue with current opioid regimen Jackie with Norco 3 per day   -. He was advised to increase fluids ( 5-7  glasses of fluid daily), limit caffeine, avoid cheese products, increase dietary fiber, increase activity and also explained to the patient. Informed verbal consent was obtained. Goals of current treatment regimen include improvement in pain, restoration of functioning- with focus on improvement in physical performance, general activity, work or disability,emotional distress, health care utilization and  decreased medication consumption. Will continue to monitor progress towards achieving/maintaining therapeutic goals with special emphasis on  1. Improvement in perceived interfernce  of pain with ADL's. Ability to do home exercises independently. Ability to do household chores indoor and/or outdoor work and social and leisure activities. Improve psychosocial and physical functioning. - he is showing progression towards this treatment goal with the current regimen. He was advised against drinking alcohol with the narcotic pain medicines, advised against driving or handling machinery while adjusting the dose of medicines or if having cognitive  issues related to the current medications. Risk of overdose and death, if medicines not taken as prescribed, were also discussed. If the patient develops new symptoms or if the symptoms worsen, the patient should call the office. While transcribing every attempt was made to maintain the accuracy of the note in terms of it's contents,there may have been some errors made inadvertently. Thank you for allowing me to participate in the care of this patient.     Matthew Coreas MD.    Cc: Katie Gallagher MD

## 2020-11-05 ENCOUNTER — VIRTUAL VISIT (OUTPATIENT)
Dept: PAIN MANAGEMENT | Age: 55
End: 2020-11-05
Payer: COMMERCIAL

## 2020-11-05 PROCEDURE — 99213 OFFICE O/P EST LOW 20 MIN: CPT | Performed by: INTERNAL MEDICINE

## 2020-11-05 RX ORDER — QUETIAPINE FUMARATE 25 MG/1
25-50 TABLET, FILM COATED ORAL NIGHTLY
Qty: 60 TABLET | Refills: 1 | Status: SHIPPED | OUTPATIENT
Start: 2020-11-05 | End: 2020-12-31 | Stop reason: SDUPTHER

## 2020-11-05 RX ORDER — MORPHINE SULFATE 30 MG/1
30 CAPSULE, EXTENDED RELEASE ORAL DAILY
Qty: 30 CAPSULE | Refills: 0 | Status: SHIPPED | OUTPATIENT
Start: 2020-11-05 | End: 2020-12-04 | Stop reason: SDUPTHER

## 2020-11-05 RX ORDER — HYDROCODONE BITARTRATE AND ACETAMINOPHEN 7.5; 325 MG/1; MG/1
1 TABLET ORAL EVERY 6 HOURS PRN
Qty: 90 TABLET | Refills: 0 | Status: SHIPPED | OUTPATIENT
Start: 2020-11-05 | End: 2020-12-04 | Stop reason: SDUPTHER

## 2020-11-05 NOTE — PROGRESS NOTES
TELE HEALTH VISIT (AUDIO-VISUAL)    Pursuant to the emergency declaration under the [address] [facility] waiver authority and the [employer] and [employer] Act, this Virtual  Visit was conducted, with patient's/legal guardian's consent, to reduce the patient's risk of exposure to COVID-19 and provide continuity of care for an established patient. Service is  provided through a video synchronous discussion virtually to substitute for in-person clinic visit. Due to this being a TeleHealth encounter (During PMLQY-17 public health emergency), evaluation of the following organ systems was limited: Vitals/Constitutional/EENT/Resp/CV/GI//MS/Neuro/Skin/Xduj-Biyx-Epc. Reubenleanne López  1965  [id number]    Mr. Sang Vicente is being seen virtually for a follow up visit using one of the following techniques  google duo   Informed verbal consent to the virtual visit was obtained from Mr. Sang Vicente. Risks associated with HIPPA compliance with the virtual visit was explained to the patient. Mr. Sang Vicente is at his residence and Dr. Domenic Douglass is in his office. HISTORY OF PRESENT ILLNESS:  Mr. Sang Vicente is a 60 y.o. male  being assessed for a follow up visit for pain management for evaluation of ongoing care regarding his symptoms and monitoring of compliance with long term use high risk medications. He has a diagnosis of   1. bwc-Prolapsed lumbosacral intervertebral disc    2. BWC Lumbar sprain, initial encounter    3. BWC Low back sprain, initial encounter    4. BWC Sprain of low back, initial encounter    5. BWC Sprain of lumbar region, initial encounter    . He complains of pain in the Low back   with radiation to the hips Bilateral . He rates the pain 6/10 and describes it as sharp, aching. Current treatment regimen has helped relieve about 60% of the pain. He denies any side effects from the current pain regimen.  Patient reports that since the last follow up (PLAVIX) 75 MG tablet Take 75 mg by mouth daily.  lisinopril (PRINIVIL;ZESTRIL) 10 MG tablet Take 10 mg by mouth daily. No facility-administered medications prior to visit. SOCIAL/FAMILY/PAST MEDICAL HISTORY: Mr. Bailey Rogers, family and past medical history was reviewed. REVIEW OF SYSTEMS:    Respiratory: Negative for apnea, chest tightness and shortness of breath or change in baseline breathing. Gastrointestinal: Negative for nausea, vomiting, abdominal pain, diarrhea, constipation, blood in stool and abdominal distention. PHYSICAL EXAM:   Nursing note and vitals per patient reviewed. There were no vitals taken for this visit. Constitutional: He appears well-developed and well-nourished. No acute distress. No respiratory distress. Skin: Skin appears to be warm and dry. No rashes or any other marks noted. He is not diaphoretic. Respiratory/Pulmonary: NO conversational dyspnea, no accessory muscle use, no coughing during exam. He does not appear to be in labored breathing. Neurological/Psychiatric:He is alert and oriented to person, place, and time. Coordination is  normal.  His mood isAppropriate and affect is Neutral/Euthymic(normal). Musculoskeletal / Extremities: Range of motion is normal. Gait is normal, assistive devices use: none. IMPRESSION:   1. bwc-Prolapsed lumbosacral intervertebral disc    2. BWC Lumbar sprain, initial encounter    3. BWC Low back sprain, initial encounter    4. BWC Sprain of low back, initial encounter    5.  BWC Sprain of lumbar region, initial encounter        PLAN:  Informed verbal consent regarding treatment was obtained  -Continue with current opioid regimen Jackie along with Norco 3 per day   -He was advised to increase fluids ( 5-7  glasses of fluid daily), limit caffeine, avoid cheese products, increase dietary fiber, increase activity and exercise as tolerated and relax regularly and enjoy meals   -Walking/Stretching exercises as advised    Current Outpatient Medications   Medication Sig Dispense Refill    morphine (LAURA) 30 MG extended release capsule Take 1 capsule by mouth daily for 28 days. 28 capsule 0    HYDROcodone-acetaminophen (NORCO) 7.5-325 MG per tablet Take 1 tablet by mouth every 6 hours as needed for Pain (max 3 per day) for up to 28 days. 84 tablet 0    QUEtiapine (SEROQUEL) 25 MG tablet Take 1-2 tablets by mouth nightly 60 tablet 1    meloxicam (MOBIC) 15 MG tablet Take 1 tablet by mouth daily as needed for Pain 30 tablet 0    DULoxetine (CYMBALTA) 30 MG extended release capsule Take 1 capsule by mouth daily 30 capsule 0    magnesium oxide (MAG-OX) 400 MG tablet Take one tablet Po BID 60 tablet 0    NOVOLOG 100 UNIT/ML injection continuous. Insulin pump averages 50-80 units daily      aspirin 325 MG tablet Take 325 mg by mouth daily.  carvedilol (COREG) 6.25 MG tablet Take 6.25 mg by mouth 2 times daily (with meals).  atorvastatin (LIPITOR) 80 MG tablet Take 80 mg by mouth nightly.  clopidogrel (PLAVIX) 75 MG tablet Take 75 mg by mouth daily.  lisinopril (PRINIVIL;ZESTRIL) 10 MG tablet Take 10 mg by mouth daily. No current facility-administered medications for this visit. I will continue his current medication regimen  which is part of the above treatment schedule. It has been helping with Mr. Albert Solis chronic  medical problems which for this visit include:   Diagnoses of bwc-Prolapsed lumbosacral intervertebral disc, BWC Lumbar sprain, initial encounter, BWC Low back sprain, initial encounter, BWC Sprain of low back, initial encounter, and BWC Sprain of lumbar region, initial encounter were pertinent to this visit. Risks and benefits of the medications and other alternative treatments  including no treatment were discussed with the patient. The common side effects of these medications were also explained to the patient. Informed verbal consent was obtained.    Goals of current treatment regimen include improvement in pain, restoration of functioning- with focus on improvement in physical performance, general activity, work or disability,emotional distress, health care utilization and  decreased medication consumption. Will continue to monitor progress towards achieving/maintaining therapeutic goals with special emphasis on  1. Improvement in perceived interfernce  of pain with ADL's. Ability to do home exercises independently. Ability to do household chores indoor and/or outdoor work and social and leisure activities. Improve psychosocial and physical functioning. - he is showing progression towards this treatment goal with the current regimen. He was advised against drinking alcohol with the narcotic pain medicines, advised against driving or handling machinery while adjusting the dose of medicines or if having cognitive  issues related to the current medications. Risk of overdose and death, if medicines not taken as prescribed, were also discussed. If the patient develops new symptoms or if the symptoms worsen, the patient should call the office. While transcribing every attempt was made to maintain the accuracy of the note in terms of it's contents,there may have been some errors made inadvertently. Thank you for allowing me to participate in the care of this patient.     Krissy Tubbs MD.    Cc: Bayron Lopez MD

## 2020-12-04 ENCOUNTER — VIRTUAL VISIT (OUTPATIENT)
Dept: PAIN MANAGEMENT | Age: 55
End: 2020-12-04
Payer: COMMERCIAL

## 2020-12-04 PROCEDURE — 99213 OFFICE O/P EST LOW 20 MIN: CPT | Performed by: INTERNAL MEDICINE

## 2020-12-04 RX ORDER — MORPHINE SULFATE 30 MG/1
30 CAPSULE, EXTENDED RELEASE ORAL DAILY
Qty: 28 CAPSULE | Refills: 0 | Status: SHIPPED | OUTPATIENT
Start: 2020-12-04 | End: 2020-12-31 | Stop reason: SDUPTHER

## 2020-12-04 RX ORDER — HYDROCODONE BITARTRATE AND ACETAMINOPHEN 7.5; 325 MG/1; MG/1
1 TABLET ORAL EVERY 6 HOURS PRN
Qty: 84 TABLET | Refills: 0 | Status: SHIPPED | OUTPATIENT
Start: 2020-12-04 | End: 2020-12-31 | Stop reason: SDUPTHER

## 2020-12-04 NOTE — PROGRESS NOTES
TELE HEALTH VISIT (AUDIO-VISUAL)    Pursuant to the emergency declaration under the [facility] [facility] waiver authority and the [employer] and [employer] Act, this Virtual  Visit was conducted, with patient's/legal guardian's consent, to reduce the patient's risk of exposure to COVID-19 and provide continuity of care for an established patient. Service is  provided through a video synchronous discussion virtually to substitute for in-person clinic visit. Due to this being a TeleHealth encounter (During TPGXX-16 public health emergency), evaluation of the following organ systems was limited: Vitals/Constitutional/EENT/Resp/CV/GI//MS/Neuro/Skin/Knpq-Jieo-Rtw. Alda Custard  1965  [id number]    Mr. Annabella Vega is being seen virtually for a follow up visit using one of the following techniques  Google Duo  Informed verbal consent to the virtual visit was obtained from Mr. Annabella Vega. Risks associated with HIPPA compliance with the virtual visit was explained to the patient. Mr. Annabella Vega is at his residence and Dr. Jennifer Perkins is in his office. HISTORY OF PRESENT ILLNESS:  Mr. Annabella Vega is a 60 y.o. male  being assessed for a follow up visit for pain management for evaluation of ongoing care regarding his symptoms and monitoring of compliance with long term use high risk medications. He has a diagnosis of   1. Chronic pain syndrome    2. BWC Lumbar sprain, initial encounter    3. bwc-Prolapsed lumbosacral intervertebral disc    4. BWC Sprain of low back, initial encounter    5. BWC Sprain of lumbar region, initial encounter    6. BWC Low back sprain, initial encounter    . He complains of pain in the Low back  with radiation to the buttocks and hips Bilateral . He rates the pain 7/10 and describes it as sharp, aching. Current treatment regimen has helped relieve about 70% of the pain. He denies any side effects from the current pain regimen.  Patient reports that since the last follow up visit the physical functioning is unchanged, family/social relationships are unchanged, mood is unchanged sleep patterns are unchanged, and that the overall functioning is unchanged. Patient denies misusing/abusing his narcotic pain medications or using any illegal drugs. There are No indicators for possible drug abuse, addiction or diversion problems. Patient reports she has been doing fair and the pain has been tolerable with the medications. He complains his back hurts the most, has occasional leg pain. He denies any constipation or cognitive side effects. He states he is working full time. ALLERGIES: Patients list of allergies were reviewed     MEDICATIONS: Mr. Julissa Moore list of medications were reviewed. His current medications are   Outpatient Medications Prior to Visit   Medication Sig Dispense Refill    HYDROcodone-acetaminophen (NORCO) 7.5-325 MG per tablet Take 1 tablet by mouth every 6 hours as needed for Pain (max 3 per day) for up to 30 days. 90 tablet 0    morphine (LAURA) 30 MG extended release capsule Take 1 capsule by mouth daily for 30 days. 30 capsule 0    QUEtiapine (SEROQUEL) 25 MG tablet Take 1-2 tablets by mouth nightly 60 tablet 1    meloxicam (MOBIC) 15 MG tablet Take 1 tablet by mouth daily as needed for Pain 30 tablet 0    DULoxetine (CYMBALTA) 30 MG extended release capsule Take 1 capsule by mouth daily 30 capsule 0    magnesium oxide (MAG-OX) 400 MG tablet Take one tablet Po BID 60 tablet 0    NOVOLOG 100 UNIT/ML injection continuous. Insulin pump averages 50-80 units daily      aspirin 325 MG tablet Take 325 mg by mouth daily.  carvedilol (COREG) 6.25 MG tablet Take 6.25 mg by mouth 2 times daily (with meals).  atorvastatin (LIPITOR) 80 MG tablet Take 80 mg by mouth nightly.  clopidogrel (PLAVIX) 75 MG tablet Take 75 mg by mouth daily.  lisinopril (PRINIVIL;ZESTRIL) 10 MG tablet Take 10 mg by mouth daily.        No facility-administered medications prior to visit. SOCIAL/FAMILY/PAST MEDICAL HISTORY: Mr. Ben Yuan, family and past medical history was reviewed. REVIEW OF SYSTEMS:    Respiratory: Negative for apnea, chest tightness and shortness of breath or change in baseline breathing. Gastrointestinal: Negative for nausea, vomiting, abdominal pain, diarrhea, constipation, blood in stool and abdominal distention. PHYSICAL EXAM:   Nursing note and vitals per patient reviewed. There were no vitals taken for this visit. Constitutional: He appears well-developed and well-nourished. No acute distress. No respiratory distress. Skin: Skin appears to be warm and dry. No rashes or any other marks noted. He is not diaphoretic. Respiratory/Pulmonary: NO conversational dyspnea, no accessory muscle use, no coughing during exam. He does not appear to be in labored breathing. Neurological/Psychiatric:He is alert and oriented to person, place, and time. Coordination is  normal.  His mood isAppropriate and affect is Neutral/Euthymic(normal). Musculoskeletal / Extremities: Range of motion is normal. Gait is normal, assistive devices use: none. IMPRESSION:   1. [facility] Lumbar sprain, initial encounter    2. [facility]-Prolapsed lumbosacral intervertebral disc        PLAN:  Informed verbal consent regarding treatment was obtained  -Continue with current opioid regimen Jackie with Norco 3 per day   -He was advised to increase fluids ( 5-7  glasses of fluid daily), limit caffeine, avoid cheese products, increase dietary fiber, increase activity and exercise as tolerated and relax regularly and enjoy meals   -Walking as tolerated 30 minutes daily   -Back stretching exercises as advised    Current Outpatient Medications   Medication Sig Dispense Refill    HYDROcodone-acetaminophen (NORCO) 7.5-325 MG per tablet Take 1 tablet by mouth every 6 hours as needed for Pain (max 3 per day) for up to 30 days.  90 tablet 0    morphine (LAURA) 30 MG extended release capsule Take 1 capsule by mouth daily for 30 days. 30 capsule 0    QUEtiapine (SEROQUEL) 25 MG tablet Take 1-2 tablets by mouth nightly 60 tablet 1    meloxicam (MOBIC) 15 MG tablet Take 1 tablet by mouth daily as needed for Pain 30 tablet 0    DULoxetine (CYMBALTA) 30 MG extended release capsule Take 1 capsule by mouth daily 30 capsule 0    magnesium oxide (MAG-OX) 400 MG tablet Take one tablet Po BID 60 tablet 0    NOVOLOG 100 UNIT/ML injection continuous. Insulin pump averages 50-80 units daily      aspirin 325 MG tablet Take 325 mg by mouth daily.  carvedilol (COREG) 6.25 MG tablet Take 6.25 mg by mouth 2 times daily (with meals).  atorvastatin (LIPITOR) 80 MG tablet Take 80 mg by mouth nightly.  clopidogrel (PLAVIX) 75 MG tablet Take 75 mg by mouth daily.  lisinopril (PRINIVIL;ZESTRIL) 10 MG tablet Take 10 mg by mouth daily. No current facility-administered medications for this visit. I will continue his current medication regimen  which is part of the above treatment schedule. It has been helping with Mr. Blake Vásquez chronic  medical problems which for this visit include:   Diagnoses of Chronic pain syndrome, BWC Lumbar sprain, initial encounter, bwc-Prolapsed lumbosacral intervertebral disc, BWC Sprain of low back, initial encounter, BWC Sprain of lumbar region, initial encounter, and BWC Low back sprain, initial encounter were pertinent to this visit. Risks and benefits of the medications and other alternative treatments  including no treatment were discussed with the patient. The common side effects of these medications were also explained to the patient. Informed verbal consent was obtained.    Goals of current treatment regimen include improvement in pain, restoration of functioning- with focus on improvement in physical performance, general activity, work or disability,emotional distress, health care utilization and  decreased medication consumption. Will continue to monitor progress towards achieving/maintaining therapeutic goals with special emphasis on  1. Improvement in perceived interfernce  of pain with ADL's. Ability to do home exercises independently. Ability to do household chores indoor and/or outdoor work and social and leisure activities. Improve psychosocial and physical functioning. - he is showing progression towards this treatment goal with the current regimen. He was advised against drinking alcohol with the narcotic pain medicines, advised against driving or handling machinery while adjusting the dose of medicines or if having cognitive  issues related to the current medications. Risk of overdose and death, if medicines not taken as prescribed, were also discussed. If the patient develops new symptoms or if the symptoms worsen, the patient should call the office. While transcribing every attempt was made to maintain the accuracy of the note in terms of it's contents,there may have been some errors made inadvertently. Thank you for allowing me to participate in the care of this patient.     Maxx Singh MD.    Cc: Gemma Castillo MD

## 2020-12-31 ENCOUNTER — VIRTUAL VISIT (OUTPATIENT)
Dept: PAIN MANAGEMENT | Age: 55
End: 2020-12-31
Payer: COMMERCIAL

## 2020-12-31 DIAGNOSIS — S33.5XXA LOW BACK SPRAIN, INITIAL ENCOUNTER: ICD-10-CM

## 2020-12-31 DIAGNOSIS — S33.5XXA SPRAIN OF LUMBAR REGION, INITIAL ENCOUNTER: ICD-10-CM

## 2020-12-31 DIAGNOSIS — S33.5XXA SPRAIN OF LOW BACK, INITIAL ENCOUNTER: ICD-10-CM

## 2020-12-31 DIAGNOSIS — S33.5XXA LUMBAR SPRAIN, INITIAL ENCOUNTER: ICD-10-CM

## 2020-12-31 DIAGNOSIS — M51.27 PROLAPSED LUMBOSACRAL INTERVERTEBRAL DISC: ICD-10-CM

## 2020-12-31 DIAGNOSIS — G89.4 CHRONIC PAIN SYNDROME: Chronic | ICD-10-CM

## 2020-12-31 PROCEDURE — 99213 OFFICE O/P EST LOW 20 MIN: CPT | Performed by: INTERNAL MEDICINE

## 2020-12-31 RX ORDER — MELOXICAM 15 MG/1
15 TABLET ORAL DAILY PRN
Qty: 30 TABLET | Refills: 0 | Status: SHIPPED | OUTPATIENT
Start: 2020-12-31 | End: 2021-01-26 | Stop reason: SDUPTHER

## 2020-12-31 RX ORDER — MORPHINE SULFATE 30 MG/1
30 CAPSULE, EXTENDED RELEASE ORAL DAILY
Qty: 28 CAPSULE | Refills: 0 | Status: SHIPPED | OUTPATIENT
Start: 2020-12-31 | End: 2021-01-26 | Stop reason: SDUPTHER

## 2020-12-31 RX ORDER — DULOXETIN HYDROCHLORIDE 30 MG/1
30 CAPSULE, DELAYED RELEASE ORAL DAILY
Qty: 30 CAPSULE | Refills: 0 | Status: SHIPPED | OUTPATIENT
Start: 2020-12-31 | End: 2021-03-25 | Stop reason: SDUPTHER

## 2020-12-31 RX ORDER — QUETIAPINE FUMARATE 25 MG/1
25-50 TABLET, FILM COATED ORAL NIGHTLY
Qty: 60 TABLET | Refills: 1 | Status: SHIPPED | OUTPATIENT
Start: 2020-12-31 | End: 2021-03-25 | Stop reason: SDUPTHER

## 2020-12-31 RX ORDER — HYDROCODONE BITARTRATE AND ACETAMINOPHEN 7.5; 325 MG/1; MG/1
1 TABLET ORAL EVERY 6 HOURS PRN
Qty: 84 TABLET | Refills: 0 | Status: SHIPPED | OUTPATIENT
Start: 2020-12-31 | End: 2021-01-26 | Stop reason: SDUPTHER

## 2020-12-31 RX ORDER — MAGNESIUM OXIDE 400 MG/1
TABLET ORAL
Qty: 60 TABLET | Refills: 1 | Status: SHIPPED | OUTPATIENT
Start: 2020-12-31 | End: 2021-03-25 | Stop reason: SDUPTHER

## 2020-12-31 NOTE — PROGRESS NOTES
TELE HEALTH VISIT (AUDIO-VISUAL)    Pursuant to the emergency declaration under the [facility], Count includes the [facility] waiver authority and the [employer] Act, this Virtual  Visit was conducted, with patient's/legal guardian's consent, to reduce the patient's risk of exposure to COVID-19 and provide continuity of care for an established patient. Service is  provided through a video synchronous discussion virtually to substitute for in-person clinic visit. Due to this being a TeleHealth encounter (During NADTD-64 public health emergency), evaluation of the following organ systems was limited: Vitals/Constitutional/EENT/Resp/CV/GI//MS/Neuro/Skin/Nzxv-Pidu-Pvl. Magdalucrecia Gustavo  1965  [id number]    Mr. Marilu Dotson is being seen virtually for a follow up visit using one of the following techniques  Google Duo  Informed verbal consent to the virtual visit was obtained from Mr. Marilu Dotson. Risks associated with HIPPA compliance with the virtual visit was explained to the patient. Mr. Marilu Dotson is at his residence and Dr. Emily Garnica is in his office. HISTORY OF PRESENT ILLNESS:  Mr. Marilu Dotson is a 60 y.o. male returns for a follow up visit for multiple medical problems. His current presenting problems are   1. BWC Lumbar sprain, initial encounter    2. bwc-Prolapsed lumbosacral intervertebral disc    3. BWC Low back sprain, initial encounter    4. BWC Sprain of low back, initial encounter    5. BWC Sprain of lumbar region, initial encounter    . As per information/history obtained from the PADT(patient assessment and documentation tool) - He complains of pain in the lower back with radiation to the buttocks and hips Bilateral He rates the pain 7/10 and describes it as sharp, aching. Pain is made worse by: walking, standing,. Current treatment regimen has helped relieve about 60% of the pain. He denies side effects from the current pain regimen. Patient reports that since the last follow up visit the physical functioning is unchanged, family/social relationships are unchanged, mood is unchanged and sleep patterns are unchanged, and that the overall functioning is unchanged. Patient denies neurological bowel or bladder. Patient denies misusing/abusing his narcotic pain medications or using any illegal drugs. There are No indicators for possible drug abuse, addiction or diversion problems. Upon obtaining the medical history from Mr. Lucrecia Manzo regarding today's office visit for his presenting problems,  reports he has been working full time still. He says his has been compliant with the medications. He complains his back and legs hurt. He mentions he is using Cymbalta 30 mg,but skips it occasionally. He denies any constipation. ALLERGIES: Patients list of allergies were reviewed     MEDICATIONS: Mr. uLcrecia Manzo list of medications were reviewed. His current medications are   Outpatient Medications Prior to Visit   Medication Sig Dispense Refill    HYDROcodone-acetaminophen (NORCO) 7.5-325 MG per tablet Take 1 tablet by mouth every 6 hours as needed for Pain (max 3 per day) for up to 28 days. 84 tablet 0    morphine (LAURA) 30 MG extended release capsule Take 1 capsule by mouth daily for 28 days. 28 capsule 0    QUEtiapine (SEROQUEL) 25 MG tablet Take 1-2 tablets by mouth nightly 60 tablet 1    meloxicam (MOBIC) 15 MG tablet Take 1 tablet by mouth daily as needed for Pain 30 tablet 0    DULoxetine (CYMBALTA) 30 MG extended release capsule Take 1 capsule by mouth daily 30 capsule 0    magnesium oxide (MAG-OX) 400 MG tablet Take one tablet Po BID 60 tablet 0    NOVOLOG 100 UNIT/ML injection continuous. Insulin pump averages 50-80 units daily      aspirin 325 MG tablet Take 325 mg by mouth daily.  carvedilol (COREG) 6.25 MG tablet Take 6.25 mg by mouth 2 times daily (with meals).  atorvastatin (LIPITOR) 80 MG tablet Take 80 mg by mouth nightly.  clopidogrel (PLAVIX) 75 MG tablet Take 75 mg by mouth daily.  lisinopril (PRINIVIL;ZESTRIL) 10 MG tablet Take 10 mg by mouth daily. No facility-administered medications prior to visit. REVIEW OF SYSTEMS:    Respiratory: Negative for apnea, chest tightness and shortness of breath or change in baseline breathing. PHYSICAL EXAM:   Nursing note and vitals reviewed. There were no vitals taken for this visit. Constitutional: He appears well-developed and well-nourished. No acute distress. Cardiovascular: Normal rate, regular rhythm, normal heart sounds, and does not have murmur. Pulmonary/Chest: Effort normal. No respiratory distress. He does not have wheezes in the lung fields. He has no rales. Neurological/Psychiatric:He is alert and oriented to person, place, and time. Coordination is  normal.  His mood isAppropriate and affect is Neutral/Euthymic(normal) . His    IMPRESSION:   1. BWC Lumbar sprain, initial encounter    2. bwc-Prolapsed lumbosacral intervertebral disc    3. BWC Low back sprain, initial encounter    4. BWC Sprain of low back, initial encounter    5. BWC Sprain of lumbar region, initial encounter    6. Chronic pain syndrome    7. Lumbar sprain, initial encounter    8. Sprain of low back, initial encounter        PLAN:  Informed verbal consent was obtained  -continue with current opioid regimen Laura along with Norco for BTP  -Discussed use, benefit, and side effects of prescribed medications. Barriers to medication compliance addressed. All patient questions answered. Pt voiced understanding.    -back stretching exercises as advised      Current Outpatient Medications   Medication Sig Dispense Refill    HYDROcodone-acetaminophen (NORCO) 7.5-325 MG per tablet Take 1 tablet by mouth every 6 hours as needed for Pain (max 3 per day) for up to 28 days. 84 tablet 0    morphine (LAURA) 30 MG extended release capsule Take 1 capsule by mouth daily for 28 days.  28 capsule 0    QUEtiapine (SEROQUEL) 25 MG tablet Take 1-2 tablets by mouth nightly 60 tablet 1    meloxicam (MOBIC) 15 MG tablet Take 1 tablet by mouth daily as needed for Pain 30 tablet 0    DULoxetine (CYMBALTA) 30 MG extended release capsule Take 1 capsule by mouth daily 30 capsule 0    magnesium oxide (MAG-OX) 400 MG tablet Take one tablet Po BID 60 tablet 0    NOVOLOG 100 UNIT/ML injection continuous. Insulin pump averages 50-80 units daily      aspirin 325 MG tablet Take 325 mg by mouth daily.  carvedilol (COREG) 6.25 MG tablet Take 6.25 mg by mouth 2 times daily (with meals).  atorvastatin (LIPITOR) 80 MG tablet Take 80 mg by mouth nightly.  clopidogrel (PLAVIX) 75 MG tablet Take 75 mg by mouth daily.  lisinopril (PRINIVIL;ZESTRIL) 10 MG tablet Take 10 mg by mouth daily. No current facility-administered medications for this visit. I will continue his current medication regimen  which is part of the above treatment schedule. It has been helping with Mr. Eneida Friedman chronic  medical problems which for this visit include:   Diagnoses of BWC Lumbar sprain, initial encounter, bwc-Prolapsed lumbosacral intervertebral disc, BWC Low back sprain, initial encounter, BWC Sprain of low back, initial encounter, and BWC Sprain of lumbar region, initial encounter were pertinent to this visit. Risks and benefits of the medications and other alternative treatments  including no treatment were discussed with the patient. The common side effects of these medications were also explained to the patient. Informed verbal consent was obtained. Goals of current treatment regimen include improvement in pain, restoration of functioning- with focus on improvement in physical performance, general activity, work or disability,emotional distress, health care utilization and  decreased medication consumption.  Will continue to monitor progress towards achieving/maintaining therapeutic goals with special emphasis on  1. Improvement in perceived interfernce  of pain with ADL's. Ability to do home exercises independently. Ability to do household chores indoor and/or outdoor work and social and leisure activities. Improve psychosocial and physical functioning. - he is showing progression towards this treatment goal with the current regimen. He was advised against drinking alcohol with the narcotic pain medicines, advised against driving or handling machinery while adjusting the dose of medicines or if having cognitive  issues related to the current medications. Risk of overdose and death, if medicines not taken as prescribed, were also discussed. If the patient develops new symptoms or if the symptoms worsen, the patient should call the office. While transcribing every attempt was made to maintain the accuracy of the note in terms of it's contents,there may have been some errors made inadvertently. Thank you for allowing me to participate in the care of this patient.     Luis Riley MD.    Cc: Maikel Neri MD

## 2021-01-26 ENCOUNTER — VIRTUAL VISIT (OUTPATIENT)
Dept: PAIN MANAGEMENT | Age: 56
End: 2021-01-26
Payer: COMMERCIAL

## 2021-01-26 DIAGNOSIS — S33.5XXA LOW BACK SPRAIN, INITIAL ENCOUNTER: ICD-10-CM

## 2021-01-26 DIAGNOSIS — S33.5XXA SPRAIN OF LUMBAR REGION, INITIAL ENCOUNTER: ICD-10-CM

## 2021-01-26 DIAGNOSIS — S33.5XXA LUMBAR SPRAIN, INITIAL ENCOUNTER: ICD-10-CM

## 2021-01-26 DIAGNOSIS — M51.27 PROLAPSED LUMBOSACRAL INTERVERTEBRAL DISC: ICD-10-CM

## 2021-01-26 DIAGNOSIS — S33.5XXA SPRAIN OF LOW BACK, INITIAL ENCOUNTER: ICD-10-CM

## 2021-01-26 DIAGNOSIS — G89.4 CHRONIC PAIN SYNDROME: Chronic | ICD-10-CM

## 2021-01-26 PROCEDURE — 99214 OFFICE O/P EST MOD 30 MIN: CPT | Performed by: INTERNAL MEDICINE

## 2021-01-26 RX ORDER — MORPHINE SULFATE 30 MG/1
30 CAPSULE, EXTENDED RELEASE ORAL DAILY
Qty: 28 CAPSULE | Refills: 0 | Status: SHIPPED | OUTPATIENT
Start: 2021-01-26 | End: 2021-02-23 | Stop reason: SDUPTHER

## 2021-01-26 RX ORDER — MELOXICAM 15 MG/1
15 TABLET ORAL DAILY PRN
Qty: 30 TABLET | Refills: 0 | Status: SHIPPED | OUTPATIENT
Start: 2021-01-26 | End: 2021-02-25 | Stop reason: SDUPTHER

## 2021-01-26 RX ORDER — HYDROCODONE BITARTRATE AND ACETAMINOPHEN 7.5; 325 MG/1; MG/1
1 TABLET ORAL EVERY 6 HOURS PRN
Qty: 84 TABLET | Refills: 0 | Status: SHIPPED | OUTPATIENT
Start: 2021-01-26 | End: 2021-02-23 | Stop reason: SDUPTHER

## 2021-01-26 NOTE — PROGRESS NOTES
TELE HEALTH VISIT (AUDIO-VISUAL)    Pursuant to the emergency declaration under the [facility], [facility] waiver authority and the [employer] Act, this Virtual  Visit was conducted, with patient's/legal guardian's consent, to reduce the patient's risk of exposure to COVID-19 and provide continuity of care for an established patient. Service is  provided through a video synchronous discussion virtually to substitute for in-person clinic visit. Due to this being a TeleHealth encounter (During QASQG-53 public health emergency), evaluation of the following organ systems was limited: Vitals/Constitutional/EENT/Resp/CV/GI//MS/Neuro/Skin/Wxmf-Bdbq-Afu. Ann Reyes  1965  [id number]    Mr. General Valverde is being seen virtually for a follow up visit using one of the following techniques   [email or web address] me  Informed verbal consent to the virtual visit was obtained from Mr. General Valverde. Risks associated with HIPPA compliance with the virtual visit was explained to the patient. Mr. General Valverde is at his residence and Dr. Mauricio Meyers is in his office. HISTORY OF PRESENT ILLNESS:  Mr. General Valverde is a 60 y.o. male returns for a follow up visit for multiple medical problems. His current presenting problems are   1. BWC Lumbar sprain, initial encounter    2. bwc-Prolapsed lumbosacral intervertebral disc    3. BWC Low back sprain, initial encounter    4. BWC Sprain of lumbar region, initial encounter    5. BWC Sprain of low back, initial encounter    . As per information/history obtained from the PADT(patient assessment and documentation tool) - He complains of pain in the lower back with radiation to the hips Left, upper leg Left and knees Left He rates the pain 6/10 and describes it as sharp, dull. Pain is made worse by: walking, standing, sitting, bending. Current treatment regimen has helped relieve about 60% of the pain. He denies side effects from the current pain regimen. Patient reports that since the last follow up visit the physical functioning is unchanged, family/social relationships are unchanged, mood is unchanged and sleep patterns are unchanged, and that the overall functioning is unchanged. Patient denies neurological bowel or bladder. Patient denies misusing/abusing his narcotic pain medications or using any illegal drugs. There are No indicators for possible drug abuse, addiction or diversion problems.  meloxicam (MOBIC) 15 MG tablet Take 1 tablet by mouth daily as needed for Pain 30 tablet 0    DULoxetine (CYMBALTA) 30 MG extended release capsule Take 1 capsule by mouth daily 30 capsule 0    magnesium oxide (MAG-OX) 400 MG tablet Take one tablet Po BID 60 tablet 1    NOVOLOG 100 UNIT/ML injection continuous. Insulin pump averages 50-80 units daily      aspirin 325 MG tablet Take 325 mg by mouth daily.  carvedilol (COREG) 6.25 MG tablet Take 6.25 mg by mouth 2 times daily (with meals).  atorvastatin (LIPITOR) 80 MG tablet Take 80 mg by mouth nightly.  clopidogrel (PLAVIX) 75 MG tablet Take 75 mg by mouth daily.  lisinopril (PRINIVIL;ZESTRIL) 10 MG tablet Take 10 mg by mouth daily. No facility-administered medications prior to visit. REVIEW OF SYSTEMS:     Respiratory: Negative for shortness of breath. Cardiovascular: Negative for chest pain, palpitations  Gastrointestinal: Negative for blood in stool, abdominal distention, nausea, vomiting, abdominal pain, diarrhea,constipation. Neurological: Negative for speech difficulty, weakness and light-headedness, dizziness, tremors, sleepiness  Psychiatric/Behavioral: Negative for suicidal ideas, hallucinations, behavioral problems, self-injury, decreased concentration/cognition, agitation, confusion. PHYSICAL EXAM:   Nursing note and vitals reviewed. There were no vitals taken for this visit. General Appearance: Patient is well nourished, well developed, well groomed and in no acute distress. Skin: Skin is warm and dry, good turgor . No rash or lesions noted. He is not diaphoretic. Pulmonary/Chest: Effort normal. No respiratory distress or use of accessory muscles. Auscultation revealing normal air entry. He does not have wheezes in the lung fields. He has no rales. Cardiovascular: Normal rate, regular rhythm, normal heart sounds, and does not have murmur. Exam reveals no gallop and no friction rub. Musculoskeletal / Extremities: Range of motion is normal. Gait is normal, assistive devices use: none. He exhibits edema: none, and no tenderness. Neurological/Psychiatric:He is alert and oriented to person, place, and time. Coordination is  normal.   Judgement and Insight is normal  His mood is Appropriate and affect is Neutral/Euthymic(normal) . His behavior is normal.   thought content normal.        IMPRESSION:     1. BWC Lumbar sprain, initial encounter    2. bwc-Prolapsed lumbosacral intervertebral disc    3. BWC Low back sprain, initial encounter    4. BWC Sprain of lumbar region, initial encounter    5. BWC Sprain of low back, initial encounter        PLAN:  Informed verbal consent was obtained.  -continue with current opioid regimen Jackie and [place]usk for BTP  -Adv Biofeedback, relaxation and meditation techniques. Referral to psychologist for CBT was also discussed with patient   -He was advised to increase fluids ( 5-7  glasses of fluid daily), limit caffeine, avoid cheese products, increase dietary fiber, increase activity and exercise as tolerated and relax regularly and enjoy meals   -he was advised proper sleep hygiene-told to avoid:use of caffeine or other stimulants after noon, alcohol use near bedtime, long or frequent naps during the day, erratic sleep schedule, heavy meals near bedtime, vigorous exercise near bedtime and use of electronic devices near bedtime. Continue with Seroquel   -Advised caffeine reduction, dietary changes, elevate head end of bed, NPO after supper, if using alcohol advised reduction of alcohol intake, tobacco cessation if smoking, weight loss.   -continue with Cymbalta 60 mg   -ROM/Stretching exercises as advised -[place] record was obtained and reviewed  for the last one year and no indicators of drug misuse  were found. Any other controlled substance prescriptions  seen on the record have been accounted for, I am aware of the patient receiving these medications. Drake Marycruz [place] record will be rechecked as part of office protocol.    -Labs ordered CBC, CMP, and TSH. -will check UDS next visit    Mr. Madina Gurrola will be prescribed  the medications  listed below which are for treatment of his presenting  medical problems which for this visit include:   Diagnoses of BWC Lumbar sprain, initial encounter, bwc-Prolapsed lumbosacral intervertebral disc, BWC Low back sprain, initial encounter, BWC Sprain of lumbar region, initial encounter, and BWC Sprain of low back, initial encounter were pertinent to this visit. Medications/orders associated with this visit:    Current Outpatient Medications   Medication Sig Dispense Refill    HYDROcodone-acetaminophen (NORCO) 7.5-325 MG per tablet Take 1 tablet by mouth every 6 hours as needed for Pain (max 3 per day) for up to 28 days. 84 tablet 0    morphine (LAURA) 30 MG extended release capsule Take 1 capsule by mouth daily for 28 days. 28 capsule 0    QUEtiapine (SEROQUEL) 25 MG tablet Take 1-2 tablets by mouth nightly 60 tablet 1    meloxicam (MOBIC) 15 MG tablet Take 1 tablet by mouth daily as needed for Pain 30 tablet 0    DULoxetine (CYMBALTA) 30 MG extended release capsule Take 1 capsule by mouth daily 30 capsule 0    magnesium oxide (MAG-OX) 400 MG tablet Take one tablet Po BID 60 tablet 1    NOVOLOG 100 UNIT/ML injection continuous. Insulin pump averages 50-80 units daily      aspirin 325 MG tablet Take 325 mg by mouth daily.  carvedilol (COREG) 6.25 MG tablet Take 6.25 mg by mouth 2 times daily (with meals).  atorvastatin (LIPITOR) 80 MG tablet Take 80 mg by mouth nightly.  clopidogrel (PLAVIX) 75 MG tablet Take 75 mg by mouth daily.  lisinopril (PRINIVIL;ZESTRIL) 10 MG tablet Take 10 mg by mouth daily. No current facility-administered medications for this visit. Goals of current treatment regimen include improvement in pain, restoration of functioning- with focus on improvement in physical performance, general activity, work or disability,emotional distress, health care utilization and  decreased medication consumption. Will continue to monitor progress towards achieving/maintaining therapeutic goals with special emphasis on  1. Improvement in perceived interfernce  of pain with ADL's. Ability to do home exercises independently. Ability to do household chores indoor and/or outdoor work and social and leisure activities. To increase flexibility/ROM, strength and endurance. Improve psychosocial and physical functioning.- he is not showing any significant progress/or showing regression  towards this goal and reassessment and adjustment of goals/treatment have been made. 2. Improving sleep to 6-7 hours a night. Improve mood/ anxiety and depression symptoms such as crying spells, low energy, problems with concentration, motivation.- he is showing progression towards this treatment goal with the current regimen. 3. Reduction of reliance on opioid analgesia/more appropriate opioid use. - he is showing progression towards this treatment goal with the current regimen. 4. Ability to focus/concentrate at work and perform the duties required of him at work  Sit through a workday without lower extremity symptoms. Stand 30-60 minutes without lower extremity symptoms. Ability to lift up to 10-20 lbs. Ability to go up and down stairs. Sit 30-60 minutes  Without having to stand up frequently. - he is maintaining/progressing towards his work related goals with the current regimen. Risks and benefits of the medications and other alternative treatments have been/were  discussed with the patient. Any questions on the  common side effects of these medications were also answered. He was advised against drinking alcohol with the narcotic pain medicines, advised against driving or handling machinery when  starting or adjusting the dose of medicines, feeling groggy or drowsy, or if having any cognitive issues related to the current medications. Heis fully aware of the risk of overdose and death, if medicines are misused and not taken as prescribed. If he develops new symptoms or if the symptoms worsen, he was told to call the office. .  Thank you for allowing me to participate in the care of this patient.     Chris Turpin MD.    Cc: Galina Araya MD

## 2021-02-23 RX ORDER — MORPHINE SULFATE 30 MG/1
30 CAPSULE, EXTENDED RELEASE ORAL DAILY
Qty: 2 CAPSULE | Refills: 0 | Status: SHIPPED | OUTPATIENT
Start: 2021-02-23 | End: 2021-02-25 | Stop reason: SDUPTHER

## 2021-02-23 RX ORDER — HYDROCODONE BITARTRATE AND ACETAMINOPHEN 7.5; 325 MG/1; MG/1
1 TABLET ORAL EVERY 6 HOURS PRN
Qty: 6 TABLET | Refills: 0 | Status: SHIPPED | OUTPATIENT
Start: 2021-02-23 | End: 2021-02-25 | Stop reason: SDUPTHER

## 2021-02-25 ENCOUNTER — OFFICE VISIT (OUTPATIENT)
Dept: PAIN MANAGEMENT | Age: 56
End: 2021-02-25
Payer: COMMERCIAL

## 2021-02-25 VITALS
TEMPERATURE: 97.6 F | SYSTOLIC BLOOD PRESSURE: 130 MMHG | DIASTOLIC BLOOD PRESSURE: 82 MMHG | BODY MASS INDEX: 29.93 KG/M2 | HEART RATE: 70 BPM | WEIGHT: 208.6 LBS

## 2021-02-25 DIAGNOSIS — S33.5XXA SPRAIN OF LOW BACK, INITIAL ENCOUNTER: ICD-10-CM

## 2021-02-25 DIAGNOSIS — S33.5XXA SPRAIN OF LUMBAR REGION, INITIAL ENCOUNTER: ICD-10-CM

## 2021-02-25 DIAGNOSIS — M51.27 PROLAPSED LUMBOSACRAL INTERVERTEBRAL DISC: ICD-10-CM

## 2021-02-25 DIAGNOSIS — G89.4 CHRONIC PAIN SYNDROME: Chronic | ICD-10-CM

## 2021-02-25 DIAGNOSIS — S33.5XXA LUMBAR SPRAIN, INITIAL ENCOUNTER: ICD-10-CM

## 2021-02-25 DIAGNOSIS — S33.5XXA LOW BACK SPRAIN, INITIAL ENCOUNTER: ICD-10-CM

## 2021-02-25 PROCEDURE — 20553 NJX 1/MLT TRIGGER POINTS 3/>: CPT | Performed by: INTERNAL MEDICINE

## 2021-02-25 PROCEDURE — 99214 OFFICE O/P EST MOD 30 MIN: CPT | Performed by: INTERNAL MEDICINE

## 2021-02-25 RX ORDER — MORPHINE SULFATE 30 MG/1
30 CAPSULE, EXTENDED RELEASE ORAL DAILY
Qty: 28 CAPSULE | Refills: 0 | Status: SHIPPED | OUTPATIENT
Start: 2021-02-25 | End: 2021-03-25 | Stop reason: SDUPTHER

## 2021-02-25 RX ORDER — HYDROCODONE BITARTRATE AND ACETAMINOPHEN 7.5; 325 MG/1; MG/1
1 TABLET ORAL EVERY 6 HOURS PRN
Qty: 84 TABLET | Refills: 0 | Status: SHIPPED | OUTPATIENT
Start: 2021-02-25 | End: 2021-03-25 | Stop reason: SDUPTHER

## 2021-02-25 RX ORDER — MELOXICAM 15 MG/1
15 TABLET ORAL DAILY PRN
Qty: 30 TABLET | Refills: 0 | Status: SHIPPED | OUTPATIENT
Start: 2021-02-25 | End: 2021-03-25 | Stop reason: SDUPTHER

## 2021-02-25 RX ORDER — TRIAMCINOLONE ACETONIDE 40 MG/ML
40 INJECTION, SUSPENSION INTRA-ARTICULAR; INTRAMUSCULAR ONCE
Status: COMPLETED | OUTPATIENT
Start: 2021-02-25 | End: 2021-02-25

## 2021-02-25 RX ADMIN — TRIAMCINOLONE ACETONIDE 40 MG: 40 INJECTION, SUSPENSION INTRA-ARTICULAR; INTRAMUSCULAR at 12:17

## 2021-02-25 NOTE — PROGRESS NOTES
Phoenix Betancur  1965  [id number]    HISTORY OF PRESENT ILLNESS:  Mr. Ruma Jain is a 60 y.o. male returns for a follow up visit for multiple medical problems. His  presenting problems are   1. BWC Lumbar sprain, initial encounter    2. bwc-Prolapsed lumbosacral intervertebral disc    3. BWC Low back sprain, initial encounter    4. BWC Sprain of lumbar region, initial encounter    5. BWC Sprain of low back, initial encounter    . As per information/history obtained from the PADT(patient assessment and documentation tool) -  He complains of pain in the lower back with radiation to the buttocks He rates the pain 9/10 and describes it as sharp, aching, burning. Pain is made worse by: movement, walking, standing, sitting, bending, lifting. Current treatment regimen has helped relieve about 50% of the pain. He denies side effects from the current pain regimen. Patient reports that since the last follow up visit the physical functioning is worse, family/social relationships are unchanged, mood is worse and sleep patterns are worse, and that the overall functioning is worse. Patient denies neurological bowel or bladder. Patient denies misusing/abusing his narcotic pain medications or using any illegal drugs. There are No indicators for possible drug abuse, addiction or diversion problems. Upon obtaining the medical history from Mr. Ruma Jain regarding today's office visit for his presenting problems, patient complains he is having increase pain in the low back for the past 2-3 weeks and has been very painful. She denies any accident,injury or trauma. He says she is using Cymbalta along with Mobic. Patient's  subjective report of his mood is fair. he describes occasional symptoms of depression, occasional  irritability and some mood swings. Describes his mood as being neutral and reports some pleasure in his daily activities. Reports  fair  appetite, energy and concentration.  Able to function well in different aspects of his daily activities. Denies suicidal or homicidal ideation. Denies any complaints of increased tension, does   Worry sometimes and occasional  irritability  he denies any c/o increased anxiety, No c/o panic attacks or symptoms of PTSD. Sleep is poor,  poor sleep latency, averages 2-3 hours of sleep at night. Intermittent, non restorative. Does not feel rested in AM. Complains of feeling sleepy  during the day. He says he is using Seroquel, but the pain has been walking him up. He denies any constipation symptoms. He states his weight has been stable. ALLERGIES/PAST MED/FAM/SOC HISTORY: Mr. Carola Campos allergies, past medical, family and social history were reviewed in the chart. Mr. Carola Campos current medications are   Outpatient Medications Prior to Visit   Medication Sig Dispense Refill    morphine (LAURA) 30 MG extended release capsule Take 1 capsule by mouth daily for 2 days. 2 capsule 0    HYDROcodone-acetaminophen (NORCO) 7.5-325 MG per tablet Take 1 tablet by mouth every 6 hours as needed for Pain (max 3 per day) for up to 2 days. 6 tablet 0    meloxicam (MOBIC) 15 MG tablet Take 1 tablet by mouth daily as needed for Pain 30 tablet 0    QUEtiapine (SEROQUEL) 25 MG tablet Take 1-2 tablets by mouth nightly 60 tablet 1    DULoxetine (CYMBALTA) 30 MG extended release capsule Take 1 capsule by mouth daily 30 capsule 0    magnesium oxide (MAG-OX) 400 MG tablet Take one tablet Po BID 60 tablet 1    NOVOLOG 100 UNIT/ML injection continuous. Insulin pump averages 50-80 units daily      aspirin 325 MG tablet Take 325 mg by mouth daily.  carvedilol (COREG) 6.25 MG tablet Take 6.25 mg by mouth 2 times daily (with meals).  atorvastatin (LIPITOR) 80 MG tablet Take 80 mg by mouth nightly.  clopidogrel (PLAVIX) 75 MG tablet Take 75 mg by mouth daily.  lisinopril (PRINIVIL;ZESTRIL) 10 MG tablet Take 10 mg by mouth daily. No facility-administered medications prior to visit.         REVIEW OF SYSTEMS: .   Respiratory: Negative for shortness of breath. Cardiovascular: Negative for chest pain, palpitations  Gastrointestinal: Negative for blood in stool, abdominal distention, nausea, vomiting, abdominal pain, diarrhea,constipation. Neurological: Negative for speech difficulty, weakness and light-headedness, dizziness, tremors, sleepiness  Psychiatric/Behavioral: Negative for suicidal ideas, hallucinations, behavioral problems, self-injury, decreased concentration/cognition, agitation, confusion. PHYSICAL EXAM:   Nursing note and vitals reviewed. /82   Pulse 70   Temp 97.6 °F (36.4 °C) (Temporal)   Wt 208 lb 9.6 oz (94.6 kg)   BMI 29.93 kg/m²   General Appearance: Patient is well nourished, well developed, well groomed and in no acute distress. Skin: Skin is warm and dry, good turgor . No rash or lesions noted. He is not diaphoretic. Pulmonary/Chest: Effort normal. No respiratory distress or use of accessory muscles. Auscultation revealing normal air entry. He does not have wheezes in the lung fields. He has no rales. Cardiovascular: Normal rate, regular rhythm, normal heart sounds, and does not have murmur. Exam reveals no gallop and no friction rub. Musculoskeletal / Extremities: Range of motion is normal. Gait is normal, assistive devices use: none. He exhibits edema: none, and no tenderness. Neurological/Psychiatric:He is alert and oriented to person, place, and time. Coordination is  normal.   Judgement and Insight is normal  His mood is Appropriate and affect is Flat/blunted and Anxious . His behavior is normal.   thought content normal.    Other: Back + taut bands with tp's   IMPRESSION:     1. BWC Lumbar sprain, initial encounter    2. bwc-Prolapsed lumbosacral intervertebral disc    3. BWC Low back sprain, initial encounter    4. BWC Sprain of lumbar region, initial encounter    5.  BWC Sprain of low back, initial encounter        PLAN:  Informed verbal consent was obtained.  -Continue with current opioid regimen Laura with [facility] for btp   -Adv Biofeedback, relaxation and meditation techniques. Referral to psychologist for CBT was also discussed with patient   -Informed verbal consent was obtained from the patient. Risks and benefits of the procedure were explained. Complications of the procedure and side effects of kenalog/ lidocaine were discussed with patient. Using 0.25% marcaine and 1cc of kenalog, the the tender trigger point areas in the  bilateral paraspinal lumbar muscles -erector spinae and longgissmus muscles were injected under aseptic condition. Mobilization attempted by stretching. Patient tolerated procedure well. Adv to apply ice today.   -Continue with Mobic 15 mg daily   -he was advised proper sleep hygiene-told to avoid:use of caffeine or other stimulants after noon, alcohol use near bedtime, long or frequent naps during the day, erratic sleep schedule, heavy meals near bedtime, vigorous exercise near bedtime and use of electronic devices near bedtime   -Continue with Seroquel   -Continue with Cymbalta increase to 60 mg   -Walking/stretching exercises as advised   -Urine drug screen with GC/MS for opiates and drugs of abuse was ordered and will follow up on results. Mr. Cj Franklin will be prescribed  the medications  listed below which are for treatment of his presenting  medical problems which for this visit include:   Diagnoses of BWC Lumbar sprain, initial encounter, bwc-Prolapsed lumbosacral intervertebral disc, BWC Low back sprain, initial encounter, BWC Sprain of lumbar region, initial encounter, and BWC Sprain of low back, initial encounter were pertinent to this visit. Medications/orders associated with this visit:    Current Outpatient Medications   Medication Sig Dispense Refill    morphine (LAURA) 30 MG extended release capsule Take 1 capsule by mouth daily for 2 days.  2 capsule 0    HYDROcodone-acetaminophen (NORCO) 7.5-325 MG per tablet Take 1 tablet by mouth every 6 hours as needed for Pain (max 3 per day) for up to 2 days. 6 tablet 0    meloxicam (MOBIC) 15 MG tablet Take 1 tablet by mouth daily as needed for Pain 30 tablet 0    QUEtiapine (SEROQUEL) 25 MG tablet Take 1-2 tablets by mouth nightly 60 tablet 1    DULoxetine (CYMBALTA) 30 MG extended release capsule Take 1 capsule by mouth daily 30 capsule 0    magnesium oxide (MAG-OX) 400 MG tablet Take one tablet Po BID 60 tablet 1    NOVOLOG 100 UNIT/ML injection continuous. Insulin pump averages 50-80 units daily      aspirin 325 MG tablet Take 325 mg by mouth daily.  carvedilol (COREG) 6.25 MG tablet Take 6.25 mg by mouth 2 times daily (with meals).  atorvastatin (LIPITOR) 80 MG tablet Take 80 mg by mouth nightly.  clopidogrel (PLAVIX) 75 MG tablet Take 75 mg by mouth daily.  lisinopril (PRINIVIL;ZESTRIL) 10 MG tablet Take 10 mg by mouth daily. No current facility-administered medications for this visit. Goals of current treatment regimen include improvement in pain, restoration of functioning- with focus on improvement in physical performance, general activity, work or disability,emotional distress, health care utilization and  decreased medication consumption. Will continue to monitor progress towards achieving/maintaining therapeutic goals with special emphasis on  1. Improvement in perceived interfernce  of pain with ADL's. Ability to do home exercises independently. Ability to do household chores indoor and/or outdoor work and social and leisure activities. To increase flexibility/ROM, strength and endurance. Improve psychosocial and physical functioning.- he is not showing any significant progress/or showing regression  towards this goal and reassessment and adjustment of goals/treatment have been made. 2. Improving sleep to 6-7 hours a night.  Improve mood/ anxiety and depression symptoms such as crying spells, low energy, problems with concentration, motivation.- he is showing progression towards this treatment goal with the current regimen. 3. Reduction of reliance on opioid analgesia/more appropriate opioid use. - he is showing progression towards this treatment goal with the current regimen. Risks and benefits of the medications and other alternative treatments have been/were  discussed with the patient. Any questions on the  common side effects of these medications were also answered. He was advised against drinking alcohol with the narcotic pain medicines, advised against driving or handling machinery when  starting or adjusting the dose of medicines, feeling groggy or drowsy, or if having any cognitive issues related to the current medications. Heis fully aware of the risk of overdose and death, if medicines are misused and not taken as prescribed. If he develops new symptoms or if the symptoms worsen, he was told to call the office. .  Thank you for allowing me to participate in the care of this patient.     Petra Decker MD    Cc: Rosalio Salgado MD

## 2021-03-25 ENCOUNTER — OFFICE VISIT (OUTPATIENT)
Dept: PAIN MANAGEMENT | Age: 56
End: 2021-03-25
Payer: COMMERCIAL

## 2021-03-25 ENCOUNTER — TELEPHONE (OUTPATIENT)
Dept: PAIN MANAGEMENT | Age: 56
End: 2021-03-25

## 2021-03-25 VITALS
TEMPERATURE: 98.3 F | HEART RATE: 71 BPM | BODY MASS INDEX: 28.73 KG/M2 | WEIGHT: 200.2 LBS | SYSTOLIC BLOOD PRESSURE: 137 MMHG | DIASTOLIC BLOOD PRESSURE: 81 MMHG

## 2021-03-25 DIAGNOSIS — G89.4 CHRONIC PAIN SYNDROME: ICD-10-CM

## 2021-03-25 DIAGNOSIS — S33.5XXA SPRAIN OF LOW BACK, INITIAL ENCOUNTER: ICD-10-CM

## 2021-03-25 DIAGNOSIS — S33.5XXA LOW BACK SPRAIN, INITIAL ENCOUNTER: ICD-10-CM

## 2021-03-25 DIAGNOSIS — S33.5XXA LUMBAR SPRAIN, INITIAL ENCOUNTER: ICD-10-CM

## 2021-03-25 DIAGNOSIS — S33.5XXA SPRAIN OF LUMBAR REGION, INITIAL ENCOUNTER: ICD-10-CM

## 2021-03-25 DIAGNOSIS — M51.27 PROLAPSED LUMBOSACRAL INTERVERTEBRAL DISC: ICD-10-CM

## 2021-03-25 PROCEDURE — 99213 OFFICE O/P EST LOW 20 MIN: CPT | Performed by: INTERNAL MEDICINE

## 2021-03-25 RX ORDER — MORPHINE SULFATE 30 MG/1
30 CAPSULE, EXTENDED RELEASE ORAL DAILY
Qty: 28 CAPSULE | Refills: 0 | Status: SHIPPED | OUTPATIENT
Start: 2021-03-25 | End: 2021-04-22 | Stop reason: SDUPTHER

## 2021-03-25 RX ORDER — DULOXETIN HYDROCHLORIDE 30 MG/1
30 CAPSULE, DELAYED RELEASE ORAL DAILY
Qty: 30 CAPSULE | Refills: 0 | Status: SHIPPED | OUTPATIENT
Start: 2021-03-25 | End: 2021-05-20 | Stop reason: SDUPTHER

## 2021-03-25 RX ORDER — MELOXICAM 15 MG/1
15 TABLET ORAL DAILY PRN
Qty: 30 TABLET | Refills: 0 | Status: SHIPPED | OUTPATIENT
Start: 2021-03-25 | End: 2021-05-20 | Stop reason: SDUPTHER

## 2021-03-25 RX ORDER — MAGNESIUM OXIDE 400 MG/1
TABLET ORAL
Qty: 60 TABLET | Refills: 1 | Status: SHIPPED | OUTPATIENT
Start: 2021-03-25 | End: 2021-05-20 | Stop reason: SDUPTHER

## 2021-03-25 RX ORDER — QUETIAPINE FUMARATE 25 MG/1
25-50 TABLET, FILM COATED ORAL NIGHTLY
Qty: 60 TABLET | Refills: 1 | Status: SHIPPED | OUTPATIENT
Start: 2021-03-25 | End: 2021-05-20 | Stop reason: SDUPTHER

## 2021-03-25 RX ORDER — HYDROCODONE BITARTRATE AND ACETAMINOPHEN 7.5; 325 MG/1; MG/1
1 TABLET ORAL EVERY 6 HOURS PRN
Qty: 84 TABLET | Refills: 0 | Status: SHIPPED | OUTPATIENT
Start: 2021-03-25 | End: 2021-04-22 | Stop reason: SDUPTHER

## 2021-03-25 NOTE — TELEPHONE ENCOUNTER
Submitted PA for LAURA, via FAX to [facility]. STATUS: [facility] does not pay for his medications. I notified by the patient and he says that the PA should be done with [employer]; [facility] only pays for OV notes.

## 2021-03-26 NOTE — TELEPHONE ENCOUNTER
Submitted PA lexus SANCHEZ  Via CMM Key: [id number] STATUS: \"Available without authorization\". The patient was notified by phone. I also called the pharmacy. I left detailed VM.

## 2021-03-27 NOTE — PROGRESS NOTES
Luz Vaughan  1965  [id number]      HISTORY OF PRESENT ILLNESS:  Mr. Salena Simental is a 60 y.o. male returns for a follow up visit for pain management  He has a diagnosis of   1. BWC Lumbar sprain, initial encounter    2. BWC Low back sprain, initial encounter    3. BWC Sprain of low back, initial encounter    4. bwc-Prolapsed lumbosacral intervertebral disc    5. BWC Sprain of lumbar region, initial encounter    6. Chronic pain syndrome    7. Lumbar sprain, initial encounter    8. Sprain of low back, initial encounter    . He complains of pain in the Low back, with radiation to left leg  He rates the pain 6/10 and describes it as aching. Current treatment regimen has helped relieve about 60% of the pain. He denies any side effects from the current pain regimen. Patient reports that since the last follow up visit the physical functioning is unchanged, family/social relationships are unchanged, mood is unchanged sleep patterns are unchanged, and that the overall functioning is unchanged. Patient denies misusing/abusing his narcotic pain medications or using any illegal drugs. There are No indicators for possible drug abuse, addiction or diversion problems. Patient reports he has been doing fair, pain has been tolerable somewhat. He mentions the injection did help a lot. He says he is using Mobic still along with Norco and Jackie, which is helping also. He reports he is working full time still. ALLERGIES: Patients list of allergies were reviewed     MEDICATIONS: Mr. Salena Simental list of medications were reviewed. His current medications are   Outpatient Medications Prior to Visit   Medication Sig Dispense Refill    NOVOLOG 100 UNIT/ML injection continuous. Insulin pump averages 50-80 units daily      aspirin 325 MG tablet Take 325 mg by mouth daily.  carvedilol (COREG) 6.25 MG tablet Take 6.25 mg by mouth 2 times daily (with meals).  atorvastatin (LIPITOR) 80 MG tablet Take 80 mg by mouth nightly.       clopidogrel (PLAVIX) 75 MG tablet Take 75 mg by mouth daily.  lisinopril (PRINIVIL;ZESTRIL) 10 MG tablet Take 10 mg by mouth daily.  morphine (LAURA) 30 MG extended release capsule Take 1 capsule by mouth daily for 28 days. 28 capsule 0    HYDROcodone-acetaminophen (NORCO) 7.5-325 MG per tablet Take 1 tablet by mouth every 6 hours as needed for Pain (max 3 per day) for up to 28 days. 84 tablet 0    meloxicam (MOBIC) 15 MG tablet Take 1 tablet by mouth daily as needed for Pain 30 tablet 0    QUEtiapine (SEROQUEL) 25 MG tablet Take 1-2 tablets by mouth nightly 60 tablet 1    DULoxetine (CYMBALTA) 30 MG extended release capsule Take 1 capsule by mouth daily 30 capsule 0    magnesium oxide (MAG-OX) 400 MG tablet Take one tablet Po BID 60 tablet 1     No facility-administered medications prior to visit. REVIEW OF SYSTEMS:    Respiratory: Negative for apnea, chest tightness and shortness of breath or change in baseline breathing. PHYSICAL EXAM:   Nursing note and vitals reviewed. /81   Pulse 71   Temp 98.3 °F (36.8 °C) (Infrared)   Wt 200 lb 3.2 oz (90.8 kg)   BMI 28.73 kg/m²   Constitutional: He appears well-developed and well-nourished. No acute distress. Cardiovascular: Normal rate, regular rhythm, normal heart sounds, and does not have murmur. Pulmonary/Chest: Effort normal. No respiratory distress. He does not have wheezes in the lung fields. He has no rales. Neurological/Psychiatric:He is alert and oriented to person, place, and time. Coordination is  normal.  His mood isAppropriate and affect is Neutral/Euthymic(normal) . IMPRESSION:   1. BWC Lumbar sprain, initial encounter    2. BWC Low back sprain, initial encounter    3. BWC Sprain of low back, initial encounter    4. bwc-Prolapsed lumbosacral intervertebral disc    5. BWC Sprain of lumbar region, initial encounter    6. Chronic pain syndrome    7. Lumbar sprain, initial encounter    8.  Sprain of low back, helping with Mr. Manuel Ying chronic  medical problems which for this visit include:   Diagnoses of BWC Lumbar sprain, initial encounter, BWC Low back sprain, initial encounter, BWC Sprain of low back, initial encounter, bwc-Prolapsed lumbosacral intervertebral disc, BWC Sprain of lumbar region, initial encounter, Chronic pain syndrome, Lumbar sprain, initial encounter, and Sprain of low back, initial encounter were pertinent to this visit. Risks and benefits of the medications and other alternative treatments  including no treatment were discussed with the patient. The common side effects of these medications were also explained to the patient. Informed verbal consent was obtained. Goals of current treatment regimen include improvement in pain, restoration of functioning- with focus on improvement in physical performance, general activity, work or disability,emotional distress, health care utilization and  decreased medication consumption. Will continue to monitor progress towards achieving/maintaining therapeutic goals with special emphasis on  1. Improvement in perceived interfernce  of pain with ADL's. Ability to do home exercises independently. Ability to do household chores indoor and/or outdoor work and social and leisure activities. Improve psychosocial and physical functioning. - he is showing progression towards this treatment goal with the current regimen. He was advised against drinking alcohol with the narcotic pain medicines, advised against driving or handling machinery while adjusting the dose of medicines or if having cognitive  issues related to the current medications. Risk of overdose and death, if medicines not taken as prescribed, were also discussed. If the patient develops new symptoms or if the symptoms worsen, the patient should call the office.     While transcribing every attempt was made to maintain the accuracy of the note in terms of it's contents,there may have been some errors made inadvertently. Thank you for allowing me to participate in the care of this patient.     Imelda Mendez MD.    Cc: Anna Torres MD

## 2021-04-22 ENCOUNTER — VIRTUAL VISIT (OUTPATIENT)
Dept: PAIN MANAGEMENT | Age: 56
End: 2021-04-22
Payer: COMMERCIAL

## 2021-04-22 DIAGNOSIS — S33.5XXA LUMBAR SPRAIN, INITIAL ENCOUNTER: ICD-10-CM

## 2021-04-22 DIAGNOSIS — S33.5XXA LOW BACK SPRAIN, INITIAL ENCOUNTER: ICD-10-CM

## 2021-04-22 DIAGNOSIS — S33.5XXA SPRAIN OF LUMBAR REGION, INITIAL ENCOUNTER: ICD-10-CM

## 2021-04-22 DIAGNOSIS — M51.27 PROLAPSED LUMBOSACRAL INTERVERTEBRAL DISC: ICD-10-CM

## 2021-04-22 DIAGNOSIS — S33.5XXA SPRAIN OF LOW BACK, INITIAL ENCOUNTER: ICD-10-CM

## 2021-04-22 DIAGNOSIS — G89.4 CHRONIC PAIN SYNDROME: ICD-10-CM

## 2021-04-22 PROCEDURE — 99214 OFFICE O/P EST MOD 30 MIN: CPT | Performed by: INTERNAL MEDICINE

## 2021-04-22 RX ORDER — MORPHINE SULFATE 30 MG/1
30 CAPSULE, EXTENDED RELEASE ORAL DAILY
Qty: 28 CAPSULE | Refills: 0 | Status: SHIPPED | OUTPATIENT
Start: 2021-04-22 | End: 2021-05-20 | Stop reason: SDUPTHER

## 2021-04-22 RX ORDER — HYDROCODONE BITARTRATE AND ACETAMINOPHEN 7.5; 325 MG/1; MG/1
1 TABLET ORAL EVERY 6 HOURS PRN
Qty: 84 TABLET | Refills: 0 | Status: SHIPPED | OUTPATIENT
Start: 2021-04-22 | End: 2021-05-20 | Stop reason: SDUPTHER

## 2021-04-22 NOTE — PROGRESS NOTES
TELE HEALTH VISIT (AUDIO-VISUAL)    Pursuant to the emergency declaration under the [facility] waiver authority and the [employer] Act, this Virtual  Visit was conducted, with patient's/legal guardian's consent, to reduce the patient's risk of exposure to COVID-19 and provide continuity of care for an established patient. Service is  provided through a video synchronous discussion virtually to substitute for in-person clinic visit. Due to this being a TeleHealth encounter (During YEA-55 public health emergency), evaluation of the following organ systems was limited: Vitals/Constitutional/EENT/Resp/CV/GI//MS/Neuro/Skin/Xtrc-Fuso-Nqr. Soumya Pittman  1965  [id number]    Mr. Lázaro Engel is being seen virtually for a follow up visit using one of the following techniques  Google Duo, Face time or Doxy. me  Informed verbal consent to the virtual visit was obtained from Mr. Lázaro Engel. Risks associated with HIPPA compliance with the virtual visit was explained to the patient. Mr. Lázaro Engel is at his residence and Dr. Rosina Hamilton is in his office. HISTORY OF PRESENT ILLNESS:  Mr. Lázaro Engel is a 60 y.o. male returns for a follow up visit for multiple medical problems. His current presenting problems are   1. BWC Lumbar sprain, initial encounter    2. bwc-Prolapsed lumbosacral intervertebral disc    3. BWC Sprain of lumbar region, initial encounter    4. BWC Low back sprain, initial encounter    5. BWC Sprain of low back, initial encounter    . As per information/history obtained from the PADT(patient assessment and documentation tool) - He complains of pain in the lower back with radiation to the hips Left, upper leg Left and knees Left He rates the pain 6/10 and describes it as dull, piercing. Pain is made worse by: movement. Current treatment regimen has helped relieve about 60% of the pain.   He denies side effects from the current pain regimen. Patient reports that since the last follow up visit the physical functioning is unchanged, family/social relationships are unchanged, mood is unchanged and sleep patterns are unchanged, and that the overall functioning is unchanged. Patient denies neurological bowel or bladder. Patient denies misusing/abusing his narcotic pain medications or using any illegal drugs. There are No indicators for possible drug abuse, addiction or diversion problems. Upon obtaining the medical history from Mr. Jet Toth regarding today's office visit for his presenting problems, patient states he has been doing fair, his pain has been somewhat tolerable. He states he had about of constipation but gotten better. Patient states he is using Laura along with Norco for  breakthrough pain. Patient states his sleep is fair. Has fairly normal sleep latency. Averages about 4-6 hours of sleep a night. Denies any signs of sleep apnea. Feels somewhat rested in the morning. Patient's  subjective report of his mood is fair. he describes occasional symptoms of depression, occasional  irritability and some mood swings. Describes his mood as being neutral and reports some pleasure in his daily activities. Reports  fair  appetite, energy and concentration. Able to function well in different aspects of his daily activities. Denies suicidal or homicidal ideation. Denies any complaints of increased tension, does   Worry sometimes and occasional  irritability  he denies any c/o increased anxiety, No c/o panic attacks or symptoms of PTSD. He reports he is working full time still and managing his ADL's. ALLERGIES/PAST MED/FAM/SOC HISTORY: Mr. Jet Toth allergies, past medical, family and social history were reviewed in the chart. Mr. Jet Toth current medications are   Outpatient Medications Prior to Visit   Medication Sig Dispense Refill    morphine (LAURA) 30 MG extended release capsule Take 1 capsule by mouth daily for 28 days. muscles. Auscultation revealing normal air entry. He does not have wheezes in the lung fields. He has no rales. Cardiovascular: Normal rate, regular rhythm, normal heart sounds, and does not have murmur. Exam reveals no gallop and no friction rub. Musculoskeletal / Extremities: Range of motion is normal. Gait is normal, assistive devices use: none. He exhibits edema: none, and no tenderness. Neurological/Psychiatric:He is alert and oriented to person, place, and time. Coordination is  normal.   Judgement and Insight is normal  His mood is Appropriate and affect is Flat/blunted and Anxious . His behavior is normal.   thought content normal.        IMPRESSION:     1. BWC Lumbar sprain, initial encounter    2. bwc-Prolapsed lumbosacral intervertebral disc    3. BWC Sprain of lumbar region, initial encounter    4. BWC Low back sprain, initial encounter    5. BWC Sprain of low back, initial encounter    6. Chronic pain syndrome    7. Lumbar sprain, initial encounter    8. Sprain of low back, initial encounter        PLAN:  Informed verbal consent was obtained.  -continue with Jackie along with Norco 2-3 per day for BTP  -He was advised to increase fluids ( 5-7  glasses of fluid daily), limit caffeine, avoid cheese products, increase dietary fiber, increase activity and exercise as tolerated and relax regularly and enjoy meals. Can use OTC Miralax  -continue with Mobic   -he was advised proper sleep hygiene-told to avoid:use of caffeine or other stimulants after noon, alcohol use near bedtime, long or frequent naps during the day, erratic sleep schedule, heavy meals near bedtime, vigorous exercise near bedtime and use of electronic devices near bedtime.  Continue with Seroquel   -continue with Cymbalta 60 mg   -ROM/Stretching exercises as advised   -advised to walk 20-30 minutes daily as tolerated     Mr. Lawyer Ontiveros will be prescribed  the medications  listed below which are for treatment of his presenting  medical problems which for this visit include:   Diagnoses of BWC Lumbar sprain, initial encounter, bwc-Prolapsed lumbosacral intervertebral disc, BWC Sprain of lumbar region, initial encounter, BWC Low back sprain, initial encounter, and BWC Sprain of low back, initial encounter were pertinent to this visit. Medications/orders associated with this visit:    Current Outpatient Medications   Medication Sig Dispense Refill    morphine (LAURA) 30 MG extended release capsule Take 1 capsule by mouth daily for 28 days. 28 capsule 0    HYDROcodone-acetaminophen (NORCO) 7.5-325 MG per tablet Take 1 tablet by mouth every 6 hours as needed for Pain (max 3 per day) for up to 28 days. 84 tablet 0    meloxicam (MOBIC) 15 MG tablet Take 1 tablet by mouth daily as needed for Pain 30 tablet 0    QUEtiapine (SEROQUEL) 25 MG tablet Take 1-2 tablets by mouth nightly 60 tablet 1    DULoxetine (CYMBALTA) 30 MG extended release capsule Take 1 capsule by mouth daily 30 capsule 0    magnesium oxide (MAG-OX) 400 MG tablet Take one tablet Po BID 60 tablet 1    NOVOLOG 100 UNIT/ML injection continuous. Insulin pump averages 50-80 units daily      aspirin 325 MG tablet Take 325 mg by mouth daily.  carvedilol (COREG) 6.25 MG tablet Take 6.25 mg by mouth 2 times daily (with meals).  atorvastatin (LIPITOR) 80 MG tablet Take 80 mg by mouth nightly.  clopidogrel (PLAVIX) 75 MG tablet Take 75 mg by mouth daily.  lisinopril (PRINIVIL;ZESTRIL) 10 MG tablet Take 10 mg by mouth daily. No current facility-administered medications for this visit. Goals of current treatment regimen include improvement in pain, restoration of functioning- with focus on improvement in physical performance, general activity, work or disability,emotional distress, health care utilization and  decreased medication consumption. Will continue to monitor progress towards achieving/maintaining therapeutic goals with special emphasis on  1. Improvement in perceived interfernce  of pain with ADL's. Ability to do home exercises independently. Ability to do household chores indoor and/or outdoor work and social and leisure activities. To increase flexibility/ROM, strength and endurance. Improve psychosocial and physical functioning.- he is showing progression towards this treatment goal with the current regimen. 2. Improving sleep to 6-7 hours a night. Improve mood/ anxiety and depression symptoms such as crying spells, low energy, problems with concentration, motivation.- he is showing progression towards this treatment goal with the current regimen. 3. Reduction of reliance on opioid analgesia/more appropriate opioid use. - he is showing progression towards this treatment goal with the current regimen. Risks and benefits of the medications and other alternative treatments have been/were  discussed with the patient. Any questions on the  common side effects of these medications were also answered. He was advised against drinking alcohol with the narcotic pain medicines, advised against driving or handling machinery when  starting or adjusting the dose of medicines, feeling groggy or drowsy, or if having any cognitive issues related to the current medications. Heis fully aware of the risk of overdose and death, if medicines are misused and not taken as prescribed. If he develops new symptoms or if the symptoms worsen, he was told to call the office. .  Thank you for allowing me to participate in the care of this patient.     Robert Bennett MD.    Cc: Tru Cartwright MD

## 2021-05-20 ENCOUNTER — VIRTUAL VISIT (OUTPATIENT)
Dept: PAIN MANAGEMENT | Age: 56
End: 2021-05-20
Payer: COMMERCIAL

## 2021-05-20 DIAGNOSIS — S33.5XXA LOW BACK SPRAIN, INITIAL ENCOUNTER: ICD-10-CM

## 2021-05-20 DIAGNOSIS — S33.5XXA SPRAIN OF LUMBAR REGION, INITIAL ENCOUNTER: ICD-10-CM

## 2021-05-20 DIAGNOSIS — S33.5XXA LUMBAR SPRAIN, INITIAL ENCOUNTER: ICD-10-CM

## 2021-05-20 DIAGNOSIS — G89.4 CHRONIC PAIN SYNDROME: ICD-10-CM

## 2021-05-20 DIAGNOSIS — S33.5XXA SPRAIN OF LOW BACK, INITIAL ENCOUNTER: ICD-10-CM

## 2021-05-20 DIAGNOSIS — M51.27 PROLAPSED LUMBOSACRAL INTERVERTEBRAL DISC: ICD-10-CM

## 2021-05-20 PROCEDURE — 99213 OFFICE O/P EST LOW 20 MIN: CPT | Performed by: INTERNAL MEDICINE

## 2021-05-20 RX ORDER — DULOXETIN HYDROCHLORIDE 30 MG/1
30 CAPSULE, DELAYED RELEASE ORAL DAILY
Qty: 30 CAPSULE | Refills: 1 | Status: SHIPPED | OUTPATIENT
Start: 2021-05-20 | End: 2021-06-17 | Stop reason: SDUPTHER

## 2021-05-20 RX ORDER — MORPHINE SULFATE 30 MG/1
30 CAPSULE, EXTENDED RELEASE ORAL DAILY
Qty: 28 CAPSULE | Refills: 0 | Status: SHIPPED | OUTPATIENT
Start: 2021-05-20 | End: 2021-06-17 | Stop reason: SDUPTHER

## 2021-05-20 RX ORDER — QUETIAPINE FUMARATE 25 MG/1
25-50 TABLET, FILM COATED ORAL NIGHTLY
Qty: 60 TABLET | Refills: 1 | Status: SHIPPED | OUTPATIENT
Start: 2021-05-20 | End: 2021-06-17 | Stop reason: SDUPTHER

## 2021-05-20 RX ORDER — HYDROCODONE BITARTRATE AND ACETAMINOPHEN 7.5; 325 MG/1; MG/1
1 TABLET ORAL EVERY 6 HOURS PRN
Qty: 70 TABLET | Refills: 0 | Status: SHIPPED | OUTPATIENT
Start: 2021-05-20 | End: 2021-06-11 | Stop reason: SDUPTHER

## 2021-05-20 RX ORDER — MAGNESIUM OXIDE 400 MG/1
TABLET ORAL
Qty: 60 TABLET | Refills: 1 | Status: SHIPPED | OUTPATIENT
Start: 2021-05-20 | End: 2021-06-17 | Stop reason: SDUPTHER

## 2021-05-20 RX ORDER — MELOXICAM 15 MG/1
15 TABLET ORAL DAILY PRN
Qty: 30 TABLET | Refills: 1 | Status: SHIPPED | OUTPATIENT
Start: 2021-05-20 | End: 2021-06-17 | Stop reason: SDUPTHER

## 2021-05-20 NOTE — PROGRESS NOTES
TELE HEALTH VISIT (AUDIO-VISUAL)    Pursuant to the emergency declaration under the [address] [facility] waiver authority and the [employer] Act, this Virtual  Visit was conducted, with patient's/legal guardian's consent, to reduce the patient's risk of exposure to COVID-19 and provide continuity of care for an established patient. Service is  provided through a video synchronous discussion virtually to substitute for in-person clinic visit. Due to this being a TeleHealth encounter (During YVGZR-66 public health emergency), evaluation of the following organ systems was limited: Vitals/Constitutional/EENT/Resp/CV/GI//MS/Neuro/Skin/Drdd-Vbip-Nab. Yesica Cid  1965  [id number]    Mr. Raj Mcgrath is being seen virtually for a follow up visit using one of the following techniques  Google Duo, Face time or Doxy. me  Informed verbal consent to the virtual visit was obtained from Mr. Raj Mcgrath. Risks associated with HIPPA compliance with the virtual visit was explained to the patient. Mr. Raj Mcgrath is at his residence and Dr. Ron Garcia is in his office. HISTORY OF PRESENT ILLNESS:  Mr. Raj Mcgrath is a 60 y.o. male returns for a follow up visit for multiple medical problems. His current presenting problems are No diagnosis found. .    As per information/history obtained from the PADT(patient assessment and documentation tool) - He complains of pain in the lower back with radiation to the upper leg Left, knees Left and lower leg Left He rates the pain 6/10 and describes it as dull, aching, stabbing. Pain is made worse by: movement, walking, standing, lifting. Current treatment regimen has helped relieve about 60% of the pain. He denies side effects from the current pain regimen. Patient reports that since the last follow up visit the physical functioning is unchanged, family/social relationships are unchanged, mood is unchanged and sleep patterns are worse, and that the overall functioning is unchanged. Patient denies neurological bowel or bladder. Patient denies misusing/abusing his narcotic pain medications or using any illegal drugs. There are No indicators for possible drug abuse, addiction or diversion problems. Upon obtaining the medical history from Mr. Santino Ames regarding today's office visit for his presenting problems, patient states he has been doing fair, pain has been manageable somewhat with the medications. He says he is using Laura along with Norco 2-3 per day. He denies any constipation symptoms. He mentions he is using OTC medications. ALLERGIES: Patients list of allergies were reviewed     MEDICATIONS: Mr. Santino Ames list of medications were reviewed. His current medications are   Outpatient Medications Prior to Visit   Medication Sig Dispense Refill    morphine (LAURA) 30 MG extended release capsule Take 1 capsule by mouth daily for 28 days. 28 capsule 0    HYDROcodone-acetaminophen (NORCO) 7.5-325 MG per tablet Take 1 tablet by mouth every 6 hours as needed for Pain (max 3 per day) for up to 28 days. 84 tablet 0    meloxicam (MOBIC) 15 MG tablet Take 1 tablet by mouth daily as needed for Pain 30 tablet 0    QUEtiapine (SEROQUEL) 25 MG tablet Take 1-2 tablets by mouth nightly 60 tablet 1    DULoxetine (CYMBALTA) 30 MG extended release capsule Take 1 capsule by mouth daily 30 capsule 0    magnesium oxide (MAG-OX) 400 MG tablet Take one tablet Po BID 60 tablet 1    NOVOLOG 100 UNIT/ML injection continuous. Insulin pump averages 50-80 units daily      aspirin 325 MG tablet Take 325 mg by mouth daily.  carvedilol (COREG) 6.25 MG tablet Take 6.25 mg by mouth 2 times daily (with meals).  atorvastatin (LIPITOR) 80 MG tablet Take 80 mg by mouth nightly.  clopidogrel (PLAVIX) 75 MG tablet Take 75 mg by mouth daily.  lisinopril (PRINIVIL;ZESTRIL) 10 MG tablet Take 10 mg by mouth daily.        No facility-administered medications prior to visit. REVIEW OF SYSTEMS:    Respiratory: Negative for apnea, chest tightness and shortness of breath or change in baseline breathing. PHYSICAL EXAM:   Nursing note and vitals reviewed. There were no vitals taken for this visit. Constitutional: He appears well-developed and well-nourished. No acute distress. Cardiovascular: Normal rate, regular rhythm, normal heart sounds, and does not have murmur. Pulmonary/Chest: Effort normal. No respiratory distress. He does not have wheezes in the lung fields. He has no rales. Neurological/Psychiatric:He is alert and oriented to person, place, and time. Coordination is  normal.  His mood isAppropriate and affect is Neutral/Euthymic(normal) . IMPRESSION:   1. Chronic pain syndrome    2. BWC Lumbar sprain, initial encounter    3. bwc-Prolapsed lumbosacral intervertebral disc    4. BWC Sprain of lumbar region, initial encounter    5. BWC Low back sprain, initial encounter    6. BWC Sprain of low back, initial encounter        PLAN:  Informed verbal consent was obtained  -Continue with current opioid regimen Laura with Norco 2-3 per day   -Walking as tolerated 20-30 minutes daily   -back stretching exercises as advised    Current Outpatient Medications   Medication Sig Dispense Refill    morphine (LAURA) 30 MG extended release capsule Take 1 capsule by mouth daily for 28 days. 28 capsule 0    HYDROcodone-acetaminophen (NORCO) 7.5-325 MG per tablet Take 1 tablet by mouth every 6 hours as needed for Pain (max 3 per day) for up to 28 days.  84 tablet 0    meloxicam (MOBIC) 15 MG tablet Take 1 tablet by mouth daily as needed for Pain 30 tablet 0    QUEtiapine (SEROQUEL) 25 MG tablet Take 1-2 tablets by mouth nightly 60 tablet 1    DULoxetine (CYMBALTA) 30 MG extended release capsule Take 1 capsule by mouth daily 30 capsule 0    magnesium oxide (MAG-OX) 400 MG tablet Take one tablet Po BID 60 tablet 1    NOVOLOG 100 UNIT/ML injection continuous. Insulin pump averages 50-80 units daily      aspirin 325 MG tablet Take 325 mg by mouth daily.  carvedilol (COREG) 6.25 MG tablet Take 6.25 mg by mouth 2 times daily (with meals).  atorvastatin (LIPITOR) 80 MG tablet Take 80 mg by mouth nightly.  clopidogrel (PLAVIX) 75 MG tablet Take 75 mg by mouth daily.  lisinopril (PRINIVIL;ZESTRIL) 10 MG tablet Take 10 mg by mouth daily. No current facility-administered medications for this visit. I will continue his current medication regimen  which is part of the above treatment schedule. It has been helping with Mr. John Joyner chronic  medical problems which for this visit include:   Diagnoses of Chronic pain syndrome, BWC Lumbar sprain, initial encounter, bwc-Prolapsed lumbosacral intervertebral disc, BWC Sprain of lumbar region, initial encounter, BWC Low back sprain, initial encounter, and BWC Sprain of low back, initial encounter were pertinent to this visit. Risks and benefits of the medications and other alternative treatments  including no treatment were discussed with the patient. The common side effects of these medications were also explained to the patient. Informed verbal consent was obtained. Goals of current treatment regimen include improvement in pain, restoration of functioning- with focus on improvement in physical performance, general activity, work or disability,emotional distress, health care utilization and  decreased medication consumption. Will continue to monitor progress towards achieving/maintaining therapeutic goals with special emphasis on  1. Improvement in perceived interfernce  of pain with ADL's. Ability to do home exercises independently. Ability to do household chores indoor and/or outdoor work and social and leisure activities. Improve psychosocial and physical functioning. - he is showing progression towards this treatment goal with the current regimen.      He was advised against drinking alcohol with the narcotic pain medicines, advised against driving or handling machinery while adjusting the dose of medicines or if having cognitive  issues related to the current medications. Risk of overdose and death, if medicines not taken as prescribed, were also discussed. If the patient develops new symptoms or if the symptoms worsen, the patient should call the office. While transcribing every attempt was made to maintain the accuracy of the note in terms of it's contents,there may have been some errors made inadvertently. Thank you for allowing me to participate in the care of this patient.     Toño Nelson MD.    Cc: Aditya Roger MD

## 2021-05-21 ENCOUNTER — PATIENT MESSAGE (OUTPATIENT)
Dept: PAIN MANAGEMENT | Age: 56
End: 2021-05-21

## 2021-05-21 DIAGNOSIS — S33.5XXA SPRAIN OF LUMBAR REGION, INITIAL ENCOUNTER: ICD-10-CM

## 2021-05-21 DIAGNOSIS — S33.5XXA LUMBAR SPRAIN, INITIAL ENCOUNTER: ICD-10-CM

## 2021-05-21 DIAGNOSIS — S33.5XXA LOW BACK SPRAIN, INITIAL ENCOUNTER: ICD-10-CM

## 2021-05-21 DIAGNOSIS — S33.5XXA SPRAIN OF LOW BACK, INITIAL ENCOUNTER: ICD-10-CM

## 2021-05-21 DIAGNOSIS — G89.4 CHRONIC PAIN SYNDROME: ICD-10-CM

## 2021-05-21 DIAGNOSIS — M51.27 PROLAPSED LUMBOSACRAL INTERVERTEBRAL DISC: ICD-10-CM

## 2021-05-25 ENCOUNTER — TELEPHONE (OUTPATIENT)
Dept: PAIN MANAGEMENT | Age: 56
End: 2021-05-25

## 2021-05-25 NOTE — TELEPHONE ENCOUNTER
Called patient regards to my chart message, patient did not answer LVM to call us back.  See my chart message from 5/21/21

## 2021-06-07 NOTE — TELEPHONE ENCOUNTER
Called pt and advised pt to call on Friday and we will send in the remaining 14 pills to the pharmacy . The insurance may pay or may not but it should just be a 10-15 out of pocket if he has problems to call the office. . we give pt a good rx coupon and see if that would help with out of pocket expensive pt voiced his understanding.

## 2021-06-07 NOTE — TELEPHONE ENCOUNTER
From: Yuan Restrepo  To: Mee Rene MD  Sent: 5/21/2021 1:17 PM EDT  Subject: Prescription Question    Hi  Following up on my previous questions. I spoke to the pharmacy and they explained that due to the way the script  was sent they only filled 70 tablets. The script reads max 2 to 3 a day and I have been on a script stating up to  3 a day for several years. Can someone please get this corrected and let me know via phone?   [phone]

## 2021-06-11 RX ORDER — HYDROCODONE BITARTRATE AND ACETAMINOPHEN 7.5; 325 MG/1; MG/1
1 TABLET ORAL EVERY 6 HOURS PRN
Qty: 14 TABLET | Refills: 0 | Status: SHIPPED | OUTPATIENT
Start: 2021-06-11 | End: 2021-06-17 | Stop reason: SDUPTHER

## 2021-06-17 ENCOUNTER — VIRTUAL VISIT (OUTPATIENT)
Dept: PAIN MANAGEMENT | Age: 56
End: 2021-06-17
Payer: COMMERCIAL

## 2021-06-17 DIAGNOSIS — M51.27 PROLAPSED LUMBOSACRAL INTERVERTEBRAL DISC: ICD-10-CM

## 2021-06-17 DIAGNOSIS — S33.5XXA SPRAIN OF LUMBAR REGION, INITIAL ENCOUNTER: ICD-10-CM

## 2021-06-17 DIAGNOSIS — S33.5XXA SPRAIN OF LOW BACK, INITIAL ENCOUNTER: ICD-10-CM

## 2021-06-17 DIAGNOSIS — S33.5XXA LUMBAR SPRAIN, INITIAL ENCOUNTER: ICD-10-CM

## 2021-06-17 DIAGNOSIS — G89.4 CHRONIC PAIN SYNDROME: ICD-10-CM

## 2021-06-17 DIAGNOSIS — S33.5XXA LOW BACK SPRAIN, INITIAL ENCOUNTER: ICD-10-CM

## 2021-06-17 PROCEDURE — 99214 OFFICE O/P EST MOD 30 MIN: CPT | Performed by: INTERNAL MEDICINE

## 2021-06-17 RX ORDER — MELOXICAM 15 MG/1
15 TABLET ORAL DAILY PRN
Qty: 30 TABLET | Refills: 1 | Status: SHIPPED | OUTPATIENT
Start: 2021-06-17 | End: 2021-10-11 | Stop reason: SDUPTHER

## 2021-06-17 RX ORDER — MAGNESIUM OXIDE 400 MG/1
TABLET ORAL
Qty: 60 TABLET | Refills: 1 | Status: SHIPPED | OUTPATIENT
Start: 2021-06-17 | End: 2021-10-11 | Stop reason: SDUPTHER

## 2021-06-17 RX ORDER — DULOXETIN HYDROCHLORIDE 30 MG/1
30 CAPSULE, DELAYED RELEASE ORAL DAILY
Qty: 30 CAPSULE | Refills: 1 | Status: SHIPPED | OUTPATIENT
Start: 2021-06-17 | End: 2022-01-03 | Stop reason: SDUPTHER

## 2021-06-17 RX ORDER — HYDROCODONE BITARTRATE AND ACETAMINOPHEN 7.5; 325 MG/1; MG/1
1 TABLET ORAL EVERY 6 HOURS PRN
Qty: 87 TABLET | Refills: 0 | Status: SHIPPED | OUTPATIENT
Start: 2021-06-17 | End: 2021-07-15 | Stop reason: SDUPTHER

## 2021-06-17 RX ORDER — QUETIAPINE FUMARATE 25 MG/1
25-50 TABLET, FILM COATED ORAL NIGHTLY
Qty: 60 TABLET | Refills: 1 | Status: SHIPPED | OUTPATIENT
Start: 2021-06-17 | End: 2021-08-12 | Stop reason: SDUPTHER

## 2021-06-17 RX ORDER — MORPHINE SULFATE 30 MG/1
30 CAPSULE, EXTENDED RELEASE ORAL DAILY
Qty: 29 CAPSULE | Refills: 0 | Status: SHIPPED | OUTPATIENT
Start: 2021-06-17 | End: 2021-07-15 | Stop reason: SDUPTHER

## 2021-06-17 NOTE — PROGRESS NOTES
TELE HEALTH VISIT (AUDIO-VISUAL)    Pursuant to the emergency declaration under the [facility] later [facility] waiver authority and the [employer] Act, this Virtual  Visit was conducted, with patient's/legal guardian's consent, to reduce the patient's risk of exposure to COVID-19 and provide continuity of care for an established patient. Service is  provided through a video synchronous discussion virtually to substitute for in-person clinic visit. Due to this being a TeleHealth encounter (During AAYGJ-58 public health emergency), evaluation of the following organ systems was limited: Vitals/Constitutional/EENT/Resp/CV/GI//MS/Neuro/Skin/Xiyk-Uorj-Jfo. Russel Mercer  1965  [id number]    Mr. Henri Brody is being seen virtually for a follow up visit using one of the following techniques  Google Duo, Face time or Doxy. me  Informed verbal consent to the virtual visit was obtained from Mr. Henri Brody. Risks associated with HIPPA compliance with the virtual visit was explained to the patient. Mr. Henri Brody is at his residence and Dr. Benjie Lozoya is in his office. HISTORY OF PRESENT ILLNESS:  Mr. Henri Brody is a 60 y.o. male returns for a follow up visit for multiple medical problems. His current presenting problems are   1. BWC Lumbar sprain, initial encounter    2. bwc-Prolapsed lumbosacral intervertebral disc    3. BWC Sprain of lumbar region, initial encounter    4. BWC Low back sprain, initial encounter    5. BWC Sprain of low back, initial encounter    . As per information/history obtained from the PADT(patient assessment and documentation tool) - He complains of pain in the lower back with radiation to the hips Left, upper leg Left and knees Left He rates the pain 7/10 and describes it as aching, burning. Pain is made worse by: movement. Current treatment regimen has helped relieve about 60% of the pain.   He denies side effects from the current pain regimen. Patient reports that since the last follow up visit the physical functioning is unchanged, family/social relationships are unchanged, mood is unchanged and sleep patterns are unchanged, and that the overall functioning is unchanged. Patient denies neurological bowel or bladder. Patient denies misusing/abusing his narcotic pain medications or using any illegal drugs. There are No indicators for possible drug abuse, addiction or diversion problems. Upon obtaining the medical history from Mr. Denis Farris regarding today's office visit for his presenting problems, patient states states he has been doing about the same. He mentions he is working full time still, but has been sick for a few days. He says he is using Laura along with Norco 3 per day, which is helping with the pain. Patient states his sleep is fair. Has fairly normal sleep latency. Averages about 4-6 hours of sleep a night. Denies any signs of sleep apnea. Feels somewhat rested in the morning. He says she is on Seroquel. Patient's  subjective report of his mood is fair. he describes occasional symptoms of depression, occasional  irritability and some mood swings. Describes his mood as being neutral and reports some pleasure in his daily activities. Reports  fair  appetite, energy and concentration. Able to function well in different aspects of his daily activities. Denies suicidal or homicidal ideation. Denies any complaints of increased tension, does   Worry sometimes and occasional  irritability  he denies any c/o increased anxiety, No c/o panic attacks or symptoms of PTSD. He mentions he is using Cymbalta. He sates he is weight has been stable and he is managing his ADLS. ALLERGIES/PAST MED/FAM/SOC HISTORY: Mr. Denis Farris allergies, past medical, family and social history were reviewed in the chart.       Mr. Denis Farris current medications are   Outpatient Medications Prior to Visit   Medication Sig Dispense Refill    morphine (LAURA) 30 MG extended release capsule Take 1 capsule by mouth daily for 28 days. 28 capsule 0    meloxicam (MOBIC) 15 MG tablet Take 1 tablet by mouth daily as needed for Pain 30 tablet 1    QUEtiapine (SEROQUEL) 25 MG tablet Take 1-2 tablets by mouth nightly 60 tablet 1    DULoxetine (CYMBALTA) 30 MG extended release capsule Take 1 capsule by mouth daily 30 capsule 1    magnesium oxide (MAG-OX) 400 MG tablet Take one tablet Po BID 60 tablet 1    NOVOLOG 100 UNIT/ML injection continuous. Insulin pump averages 50-80 units daily      aspirin 325 MG tablet Take 325 mg by mouth daily.  carvedilol (COREG) 6.25 MG tablet Take 6.25 mg by mouth 2 times daily (with meals).  atorvastatin (LIPITOR) 80 MG tablet Take 80 mg by mouth nightly.  clopidogrel (PLAVIX) 75 MG tablet Take 75 mg by mouth daily.  lisinopril (PRINIVIL;ZESTRIL) 10 MG tablet Take 10 mg by mouth daily. No facility-administered medications prior to visit. REVIEW OF SYSTEMS:     Respiratory: Negative for shortness of breath. Cardiovascular: Negative for chest pain, palpitations  Gastrointestinal: Negative for blood in stool, abdominal distention, nausea, vomiting, abdominal pain, diarrhea,constipation. Neurological: Negative for speech difficulty, weakness and light-headedness, dizziness, tremors, sleepiness  Psychiatric/Behavioral: Negative for suicidal ideas, hallucinations, behavioral problems, self-injury, decreased concentration/cognition, agitation, confusion. PHYSICAL EXAM:   Nursing note and vitals reviewed. There were no vitals taken for this visit. General Appearance: Patient is well nourished, well developed, well groomed and in no acute distress. Skin: Skin is warm and dry, good turgor . No rash or lesions noted. He is not diaphoretic. Pulmonary/Chest: Effort normal. No respiratory distress or use of accessory muscles. Auscultation revealing normal air entry. He does not have wheezes in the lung fields.  He and meditation techniques. Referral to psychologist for CBT was also discussed with patient   -Continue with Cymbalta 60 mg   -Walking/Stretching exercises as advised   Mr. Juancho Cordova will be prescribed  the medications  listed below which are for treatment of his presenting  medical problems which for this visit include:   Diagnoses of BWC Lumbar sprain, initial encounter, bwc-Prolapsed lumbosacral intervertebral disc, BWC Sprain of lumbar region, initial encounter, BWC Low back sprain, initial encounter, and BWC Sprain of low back, initial encounter were pertinent to this visit. Medications/orders associated with this visit:    Current Outpatient Medications   Medication Sig Dispense Refill    morphine (LAURA) 30 MG extended release capsule Take 1 capsule by mouth daily for 28 days. 28 capsule 0    meloxicam (MOBIC) 15 MG tablet Take 1 tablet by mouth daily as needed for Pain 30 tablet 1    QUEtiapine (SEROQUEL) 25 MG tablet Take 1-2 tablets by mouth nightly 60 tablet 1    DULoxetine (CYMBALTA) 30 MG extended release capsule Take 1 capsule by mouth daily 30 capsule 1    magnesium oxide (MAG-OX) 400 MG tablet Take one tablet Po BID 60 tablet 1    NOVOLOG 100 UNIT/ML injection continuous. Insulin pump averages 50-80 units daily      aspirin 325 MG tablet Take 325 mg by mouth daily.  carvedilol (COREG) 6.25 MG tablet Take 6.25 mg by mouth 2 times daily (with meals).  atorvastatin (LIPITOR) 80 MG tablet Take 80 mg by mouth nightly.  clopidogrel (PLAVIX) 75 MG tablet Take 75 mg by mouth daily.  lisinopril (PRINIVIL;ZESTRIL) 10 MG tablet Take 10 mg by mouth daily. No current facility-administered medications for this visit. Goals of current treatment regimen include improvement in pain, restoration of functioning- with focus on improvement in physical performance, general activity, work or disability,emotional distress, health care utilization and  decreased medication consumption.

## 2021-07-15 ENCOUNTER — OFFICE VISIT (OUTPATIENT)
Dept: PAIN MANAGEMENT | Age: 56
End: 2021-07-15
Payer: COMMERCIAL

## 2021-07-15 VITALS
BODY MASS INDEX: 28.41 KG/M2 | SYSTOLIC BLOOD PRESSURE: 147 MMHG | WEIGHT: 198 LBS | DIASTOLIC BLOOD PRESSURE: 90 MMHG | HEART RATE: 82 BPM

## 2021-07-15 DIAGNOSIS — S33.5XXA SPRAIN OF LOW BACK, INITIAL ENCOUNTER: ICD-10-CM

## 2021-07-15 DIAGNOSIS — S33.5XXA LUMBAR SPRAIN, INITIAL ENCOUNTER: ICD-10-CM

## 2021-07-15 DIAGNOSIS — G89.4 CHRONIC PAIN SYNDROME: ICD-10-CM

## 2021-07-15 DIAGNOSIS — S33.5XXA SPRAIN OF LUMBAR REGION, INITIAL ENCOUNTER: ICD-10-CM

## 2021-07-15 DIAGNOSIS — M51.27 PROLAPSED LUMBOSACRAL INTERVERTEBRAL DISC: ICD-10-CM

## 2021-07-15 DIAGNOSIS — S33.5XXA LOW BACK SPRAIN, INITIAL ENCOUNTER: ICD-10-CM

## 2021-07-15 PROCEDURE — 99214 OFFICE O/P EST MOD 30 MIN: CPT | Performed by: INTERNAL MEDICINE

## 2021-07-15 RX ORDER — HYDROCODONE BITARTRATE AND ACETAMINOPHEN 7.5; 325 MG/1; MG/1
1 TABLET ORAL EVERY 6 HOURS PRN
Qty: 84 TABLET | Refills: 0 | Status: SHIPPED | OUTPATIENT
Start: 2021-07-15 | End: 2021-08-12 | Stop reason: SDUPTHER

## 2021-07-15 RX ORDER — MORPHINE SULFATE 30 MG/1
30 CAPSULE, EXTENDED RELEASE ORAL DAILY
Qty: 28 CAPSULE | Refills: 0 | Status: SHIPPED | OUTPATIENT
Start: 2021-07-15 | End: 2021-08-12 | Stop reason: SDUPTHER

## 2021-07-15 NOTE — PROGRESS NOTES
Brittanie Araceli  1965  [id number]    HISTORY OF PRESENT ILLNESS:  Mr. Conner Willett is a 60 y.o. male returns for a follow up visit for multiple medical problems. His current presenting problems are   1. BWC Lumbar sprain, initial encounter    2. bwc-Prolapsed lumbosacral intervertebral disc    3. BWC Sprain of lumbar region, initial encounter    4. BWC Low back sprain, initial encounter    5. BWC Sprain of low back, initial encounter    . As per information/history obtained from the PADT(patient assessment and documentation tool) - He complains of pain in the lower back with radiation to the buttocks, hips Left, upper leg Left and knees Left He rates the pain 7/10 and describes it as sharp, burning, numbness. Pain is made worse by: movement, walking, standing, bending, lifting. Current treatment regimen has helped relieve about 60% of the pain. He denies side effects from the current pain regimen. Patient reports that since the last follow up visit the physical functioning is unchanged, family/social relationships are unchanged, mood is unchanged and sleep patterns are unchanged, and that the overall functioning is unchanged. Patient denies neurological bowel or bladder. Patient denies misusing/abusing his narcotic pain medications or using any illegal drugs. There are No indicators for possible drug abuse, addiction or diversion problems. Upon obtaining the medical history from Mr. Conner Willett regarding today's office visit for his presenting problems, patient states he has been doing fair, pain has been baseline, managing with the medications. He says he is using Jackie 30 mg along with Norco 3 per day. He denies any constipation symptoms. He mentions he is working full time. He denies any constipation symptoms. Patient states he sleeps well. Has normal sleep latency. Averages about 5-7 hours of sleep a night. Denies any signs of sleep apnea. Feels rested in the AM. Denies any sleep attacks during the day. Medication regimen helps with maintaining/regulating sleep. He says he is on Seroquel. Patient's  subjective report of his mood is fair. he describes occasional symptoms of depression, occasional  irritability and some mood swings. Describes his mood as being neutral and reports some pleasure in his daily activities. Reports  fair  appetite, energy and concentration. Able to function well in different aspects of his daily activities. Denies suicidal or homicidal ideation. Denies any complaints of increased tension, does   Worry sometimes and occasional  irritability  he denies any c/o increased anxiety, No c/o panic attacks or symptoms of PTSD. He reports she is using Mobic still 1 per day as needed. ALLERGIES/PAST MED/FAM/SOC HISTORY: Mr. Asif Paige allergies, past medical, family and social history were reviewed in the chart. Mr. Asif Paige current medications are   Outpatient Medications Prior to Visit   Medication Sig Dispense Refill    morphine (LAURA) 30 MG extended release capsule Take 1 capsule by mouth daily for 29 days. 29 capsule 0    meloxicam (MOBIC) 15 MG tablet Take 1 tablet by mouth daily as needed for Pain 30 tablet 1    QUEtiapine (SEROQUEL) 25 MG tablet Take 1-2 tablets by mouth nightly 60 tablet 1    DULoxetine (CYMBALTA) 30 MG extended release capsule Take 1 capsule by mouth daily 30 capsule 1    magnesium oxide (MAG-OX) 400 MG tablet Take one tablet Po BID 60 tablet 1    HYDROcodone-acetaminophen (NORCO) 7.5-325 MG per tablet Take 1 tablet by mouth every 6 hours as needed for Pain (max 3 per day) for up to 29 days. 87 tablet 0    NOVOLOG 100 UNIT/ML injection continuous. Insulin pump averages 50-80 units daily      aspirin 325 MG tablet Take 325 mg by mouth daily.  carvedilol (COREG) 6.25 MG tablet Take 6.25 mg by mouth 2 times daily (with meals).  atorvastatin (LIPITOR) 80 MG tablet Take 80 mg by mouth nightly.       clopidogrel (PLAVIX) 75 MG tablet Take 75 mg by mouth daily.      lisinopril (PRINIVIL;ZESTRIL) 10 MG tablet Take 10 mg by mouth daily. No facility-administered medications prior to visit. REVIEW OF SYSTEMS:     Respiratory: Negative for shortness of breath. Cardiovascular: Negative for chest pain, palpitations  Gastrointestinal: Negative for blood in stool, abdominal distention, nausea, vomiting, abdominal pain, diarrhea,constipation. Neurological: Negative for speech difficulty, weakness and light-headedness, dizziness, tremors, sleepiness  Psychiatric/Behavioral: Negative for suicidal ideas, hallucinations, behavioral problems, self-injury, decreased concentration/cognition, agitation, confusion. PHYSICAL EXAM:   Nursing note and vitals reviewed. BP (!) 147/90   Pulse 82   Wt 198 lb (89.8 kg)   BMI 28.41 kg/m²   General Appearance: Patient is well nourished, well developed, well groomed and in no acute distress. Skin: Skin is warm and dry, good turgor . No rash or lesions noted. He is not diaphoretic. Pulmonary/Chest: Effort normal. No respiratory distress or use of accessory muscles. Auscultation revealing normal air entry. He does not have wheezes in the lung fields. He has no rales. Cardiovascular: Normal rate, regular rhythm, normal heart sounds, and does not have murmur. Exam reveals no gallop and no friction rub. Musculoskeletal / Extremities: Range of motion is normal. Gait is normal, assistive devices use: none. He exhibits edema: none, and no tenderness. Neurological/Psychiatric:He is alert and oriented to person, place, and time. Coordination is  normal.   Judgement and Insight is normal  His mood is Appropriate and affect is Flat/blunted and Anxious . His behavior is normal.   thought content normal.        IMPRESSION:     1. BWC Lumbar sprain, initial encounter    2. bwc-Prolapsed lumbosacral intervertebral disc    3. BWC Sprain of lumbar region, initial encounter    4. BWC Low back sprain, initial encounter    5. BWC Sprain of low back, initial encounter        PLAN:  Informed verbal consent was obtained. -He was advised weight reduction, diet changes- 800-1200 vicki diet, diet diary, exercising, nutritional  consult increased physical activity as tolerated   -ROM/Stretching exercises as advised   -Continue with Jackie along with Norco 3 per day  -he was advised proper sleep hygiene-told to avoid:use of caffeine or other stimulants after noon, alcohol use near bedtime, long or frequent naps during the day, erratic sleep schedule, heavy meals near bedtime, vigorous exercise near bedtime and use of electronic devices near bedtime.   -Continue with Seroquel   -Adv Biofeedback, relaxation and meditation techniques. Referral to psychologist for CBT was also discussed with patient   -Continue with Cymbalta 60 mg   -Labs to be done next month   -He was advised to increase fluids ( 5-7  glasses of fluid daily), limit caffeine, avoid cheese products, increase dietary fiber, increase activity and exercise as tolerated and relax regularly and enjoy meals     Mr. Tracie Craft will be prescribed  the medications  listed below which are for treatment of his presenting  medical problems which for this visit include:   Diagnoses of BWC Lumbar sprain, initial encounter, bwc-Prolapsed lumbosacral intervertebral disc, BWC Sprain of lumbar region, initial encounter, BWC Low back sprain, initial encounter, and BWC Sprain of low back, initial encounter were pertinent to this visit. Medications/orders associated with this visit:    Current Outpatient Medications   Medication Sig Dispense Refill    morphine (JACKIE) 30 MG extended release capsule Take 1 capsule by mouth daily for 29 days.  29 capsule 0    meloxicam (MOBIC) 15 MG tablet Take 1 tablet by mouth daily as needed for Pain 30 tablet 1    QUEtiapine (SEROQUEL) 25 MG tablet Take 1-2 tablets by mouth nightly 60 tablet 1    DULoxetine (CYMBALTA) 30 MG extended release capsule Take 1 capsule by mouth daily 30 capsule 1    magnesium oxide (MAG-OX) 400 MG tablet Take one tablet Po BID 60 tablet 1    HYDROcodone-acetaminophen (NORCO) 7.5-325 MG per tablet Take 1 tablet by mouth every 6 hours as needed for Pain (max 3 per day) for up to 29 days. 87 tablet 0    NOVOLOG 100 UNIT/ML injection continuous. Insulin pump averages 50-80 units daily      aspirin 325 MG tablet Take 325 mg by mouth daily.  carvedilol (COREG) 6.25 MG tablet Take 6.25 mg by mouth 2 times daily (with meals).  atorvastatin (LIPITOR) 80 MG tablet Take 80 mg by mouth nightly.  clopidogrel (PLAVIX) 75 MG tablet Take 75 mg by mouth daily.  lisinopril (PRINIVIL;ZESTRIL) 10 MG tablet Take 10 mg by mouth daily. No current facility-administered medications for this visit. Goals of current treatment regimen include improvement in pain, restoration of functioning- with focus on improvement in physical performance, general activity, work or disability,emotional distress, health care utilization and  decreased medication consumption. Will continue to monitor progress towards achieving/maintaining therapeutic goals with special emphasis on  1. Improvement in perceived interfernce  of pain with ADL's. Ability to do home exercises independently. Ability to do household chores indoor and/or outdoor work and social and leisure activities. To increase flexibility/ROM, strength and endurance. Improve psychosocial and physical functioning.- he is showing progression towards this treatment goal with the current regimen. 2. Improving sleep to 6-7 hours a night. Improve mood/ anxiety and depression symptoms such as crying spells, low energy, problems with concentration, motivation.- he is showing progression towards this treatment goal with the current regimen. 3. Reduction of reliance on opioid analgesia/more appropriate opioid use. - he is showing progression towards this treatment goal with the current regimen.    4. Ability to focus/concentrate at work and perform the duties required of him at work  Sit through a workday without lower extremity symptoms. Stand 30-60 minutes without lower extremity symptoms. Ability to lift up to 10-20 lbs. Ability to go up and down stairs. Sit 30-60 minutes  Without having to stand up frequently. - he is maintaining/progressing towards his work related goals with the current regimen. Risks and benefits of the medications and other alternative treatments have been/were  discussed with the patient. Any questions on the  common side effects of these medications were also answered. He was advised against drinking alcohol with the narcotic pain medicines, advised against driving or handling machinery when  starting or adjusting the dose of medicines, feeling groggy or drowsy, or if having any cognitive issues related to the current medications. Heis fully aware of the risk of overdose and death, if medicines are misused and not taken as prescribed. If he develops new symptoms or if the symptoms worsen, he was told to call the office. .  Thank you for allowing me to participate in the care of this patient.     Yulia House MD.    Cc: Dom Crespo MD

## 2021-08-05 ENCOUNTER — TELEPHONE (OUTPATIENT)
Dept: PAIN MANAGEMENT | Age: 56
End: 2021-08-05

## 2021-08-05 NOTE — TELEPHONE ENCOUNTER
Our Practice manager spoke to the billing department for the date of July 15th to get this switched over to [facility], she then spoke to the patient to let him know that the problem was resolved.

## 2021-08-12 ENCOUNTER — OFFICE VISIT (OUTPATIENT)
Dept: PAIN MANAGEMENT | Age: 56
End: 2021-08-12
Payer: COMMERCIAL

## 2021-08-12 VITALS
DIASTOLIC BLOOD PRESSURE: 72 MMHG | BODY MASS INDEX: 28.55 KG/M2 | HEART RATE: 71 BPM | WEIGHT: 199 LBS | SYSTOLIC BLOOD PRESSURE: 123 MMHG

## 2021-08-12 DIAGNOSIS — S33.5XXA LOW BACK SPRAIN, INITIAL ENCOUNTER: ICD-10-CM

## 2021-08-12 DIAGNOSIS — G89.4 CHRONIC PAIN SYNDROME: ICD-10-CM

## 2021-08-12 DIAGNOSIS — S33.5XXA LUMBAR SPRAIN, INITIAL ENCOUNTER: ICD-10-CM

## 2021-08-12 DIAGNOSIS — M51.27 PROLAPSED LUMBOSACRAL INTERVERTEBRAL DISC: ICD-10-CM

## 2021-08-12 DIAGNOSIS — S33.5XXA SPRAIN OF LOW BACK, INITIAL ENCOUNTER: ICD-10-CM

## 2021-08-12 DIAGNOSIS — S33.5XXA SPRAIN OF LUMBAR REGION, INITIAL ENCOUNTER: ICD-10-CM

## 2021-08-12 PROCEDURE — 99213 OFFICE O/P EST LOW 20 MIN: CPT | Performed by: INTERNAL MEDICINE

## 2021-08-12 RX ORDER — QUETIAPINE FUMARATE 25 MG/1
25-50 TABLET, FILM COATED ORAL NIGHTLY
Qty: 60 TABLET | Refills: 1 | Status: SHIPPED | OUTPATIENT
Start: 2021-08-12 | End: 2021-10-11 | Stop reason: SDUPTHER

## 2021-08-12 RX ORDER — MORPHINE SULFATE 30 MG/1
30 CAPSULE, EXTENDED RELEASE ORAL DAILY
Qty: 30 CAPSULE | Refills: 0 | Status: SHIPPED | OUTPATIENT
Start: 2021-08-12 | End: 2021-09-10 | Stop reason: SDUPTHER

## 2021-08-12 RX ORDER — HYDROCODONE BITARTRATE AND ACETAMINOPHEN 7.5; 325 MG/1; MG/1
1 TABLET ORAL EVERY 6 HOURS PRN
Qty: 90 TABLET | Refills: 0 | Status: SHIPPED | OUTPATIENT
Start: 2021-08-12 | End: 2021-09-10 | Stop reason: SDUPTHER

## 2021-08-12 NOTE — PROGRESS NOTES
Medications Prior to Visit   Medication Sig Dispense Refill    morphine (LAURA) 30 MG extended release capsule Take 1 capsule by mouth daily for 28 days. 28 capsule 0    HYDROcodone-acetaminophen (NORCO) 7.5-325 MG per tablet Take 1 tablet by mouth every 6 hours as needed for Pain (max 3 per day) for up to 28 days. 84 tablet 0    meloxicam (MOBIC) 15 MG tablet Take 1 tablet by mouth daily as needed for Pain 30 tablet 1    QUEtiapine (SEROQUEL) 25 MG tablet Take 1-2 tablets by mouth nightly 60 tablet 1    DULoxetine (CYMBALTA) 30 MG extended release capsule Take 1 capsule by mouth daily 30 capsule 1    magnesium oxide (MAG-OX) 400 MG tablet Take one tablet Po BID 60 tablet 1    NOVOLOG 100 UNIT/ML injection continuous. Insulin pump averages 50-80 units daily      aspirin 325 MG tablet Take 325 mg by mouth daily.  carvedilol (COREG) 6.25 MG tablet Take 6.25 mg by mouth 2 times daily (with meals).  atorvastatin (LIPITOR) 80 MG tablet Take 80 mg by mouth nightly.  clopidogrel (PLAVIX) 75 MG tablet Take 75 mg by mouth daily.  lisinopril (PRINIVIL;ZESTRIL) 10 MG tablet Take 10 mg by mouth daily. No facility-administered medications prior to visit. REVIEW OF SYSTEMS:    Respiratory: Negative for apnea, chest tightness and shortness of breath or change in baseline breathing. PHYSICAL EXAM:   Nursing note and vitals reviewed. /72   Pulse 71   Wt 199 lb (90.3 kg)   BMI 28.55 kg/m²   Constitutional: He appears well-developed and well-nourished. No acute distress. Cardiovascular: Normal rate, regular rhythm, normal heart sounds, and does not have murmur. Pulmonary/Chest: Effort normal. No respiratory distress. He does not have wheezes in the lung fields. He has no rales. Neurological/Psychiatric:He is alert and oriented to person, place, and time. Coordination is  normal.  His mood isAppropriate and affect is Flat/blunted and Anxious . IMPRESSION:   1.  [address] [facility] Lumbar sprain, initial encounter    2. bwc-Prolapsed lumbosacral intervertebral disc    3. BWC Sprain of lumbar region, initial encounter    4. BWC Low back sprain, initial encounter    5. BWC Sprain of low back, initial encounter    6. Chronic pain syndrome    7. Lumbar sprain, initial encounter    8. Sprain of low back, initial encounter        PLAN:  Informed verbal consent was obtained  -Continue with current opioid regimen Ms Contin with Percocet 3 per day   -He was advised to increase fluids ( 5-7  glasses of fluid daily), limit caffeine, avoid cheese products, increase dietary fiber, increase activity and exercise as tolerated and relax regularly and enjoy meals   -Labs ordered CBC, CMP, and TSH.   -Continue with all other adjuvants as before    Current Outpatient Medications   Medication Sig Dispense Refill    morphine (LAURA) 30 MG extended release capsule Take 1 capsule by mouth daily for 28 days. 28 capsule 0    HYDROcodone-acetaminophen (NORCO) 7.5-325 MG per tablet Take 1 tablet by mouth every 6 hours as needed for Pain (max 3 per day) for up to 28 days. 84 tablet 0    meloxicam (MOBIC) 15 MG tablet Take 1 tablet by mouth daily as needed for Pain 30 tablet 1    QUEtiapine (SEROQUEL) 25 MG tablet Take 1-2 tablets by mouth nightly 60 tablet 1    DULoxetine (CYMBALTA) 30 MG extended release capsule Take 1 capsule by mouth daily 30 capsule 1    magnesium oxide (MAG-OX) 400 MG tablet Take one tablet Po BID 60 tablet 1    NOVOLOG 100 UNIT/ML injection continuous. Insulin pump averages 50-80 units daily      aspirin 325 MG tablet Take 325 mg by mouth daily.  carvedilol (COREG) 6.25 MG tablet Take 6.25 mg by mouth 2 times daily (with meals).  atorvastatin (LIPITOR) 80 MG tablet Take 80 mg by mouth nightly.  clopidogrel (PLAVIX) 75 MG tablet Take 75 mg by mouth daily.  lisinopril (PRINIVIL;ZESTRIL) 10 MG tablet Take 10 mg by mouth daily.        No current facility-administered medications for this visit. I will continue his current medication regimen  which is part of the above treatment schedule. It has been helping with Mr. Galen Ng chronic  medical problems which for this visit include:   Diagnoses of BWC Lumbar sprain, initial encounter, bwc-Prolapsed lumbosacral intervertebral disc, BWC Sprain of lumbar region, initial encounter, BWC Low back sprain, initial encounter, and BWC Sprain of low back, initial encounter were pertinent to this visit. Risks and benefits of the medications and other alternative treatments  including no treatment were discussed with the patient. The common side effects of these medications were also explained to the patient. Informed verbal consent was obtained. Goals of current treatment regimen include improvement in pain, restoration of functioning- with focus on improvement in physical performance, general activity, work or disability,emotional distress, health care utilization and  decreased medication consumption. Will continue to monitor progress towards achieving/maintaining therapeutic goals with special emphasis on  1. Improvement in perceived interfernce  of pain with ADL's. Ability to do home exercises independently. Ability to do household chores indoor and/or outdoor work and social and leisure activities. Improve psychosocial and physical functioning. - he is showing progression towards this treatment goal with the current regimen. He was advised against drinking alcohol with the narcotic pain medicines, advised against driving or handling machinery while adjusting the dose of medicines or if having cognitive  issues related to the current medications. Risk of overdose and death, if medicines not taken as prescribed, were also discussed. If the patient develops new symptoms or if the symptoms worsen, the patient should call the office.     While transcribing every attempt was made to maintain the accuracy of the note in terms of it's contents,there may have been some errors made inadvertently. Thank you for allowing me to participate in the care of this patient.     Harriet Saunders MD.    Cc: Annie Mari MD

## 2021-09-07 LAB
ALBUMIN SERPL-MCNC: 4.1 G/DL (ref 3.5–5.7)
ALP BLD-CCNC: 69 IU/L (ref 35–135)
ALT SERPL-CCNC: 11 IU/L (ref 10–60)
ANION GAP SERPL CALCULATED.3IONS-SCNC: 7 MMOL/L
AST SERPL-CCNC: 15 IU/L (ref 10–40)
BILIRUB SERPL-MCNC: 0.5 MG/DL (ref 0–1.2)
BUN BLDV-MCNC: 13 MG/DL (ref 8–26)
CALCIUM SERPL-MCNC: 9.2 MG/DL (ref 8.5–10.4)
CHLORIDE BLD-SCNC: 101 MEQ/L (ref 98–111)
CO2: 28 MMOL/L (ref 21–31)
CREAT SERPL-MCNC: 0.7 MG/DL (ref 0.7–1.3)
GFR AFRICAN AMERICAN: 118 ML/MIN/1.73 M2
GFR NON-AFRICAN AMERICAN: 102 ML/MIN/1.73 M2
GLUCOSE BLD-MCNC: 244 MG/DL (ref 70–99)
HCT VFR BLD CALC: 44.2 % (ref 40–51)
HEMOGLOBIN: 14.5 G/DL (ref 13.7–17.5)
MCH RBC QN AUTO: 30.9 PG (ref 26–34)
MCHC RBC AUTO-ENTMCNC: 32.8 G/DL (ref 30.7–35.5)
MCV RBC AUTO: 94 FL (ref 80–100)
PDW BLD-RTO: 12 %
PLATELET # BLD: 235 X10(3)/MCL (ref 155–369)
PMV BLD AUTO: 11.1 FL (ref 8.8–12.5)
POTASSIUM SERPL-SCNC: 4.5 MEQ/L (ref 3.6–5.1)
RBC # BLD: 4.7 X10(6)/MCL (ref 4.6–6.1)
SODIUM BLD-SCNC: 136 MEQ/L (ref 135–145)
TOTAL PROTEIN: 6.8 G/DL (ref 6–8)
WBC # BLD: 8.4 X10(3)/MCL (ref 3.7–10.3)

## 2021-09-10 ENCOUNTER — OFFICE VISIT (OUTPATIENT)
Dept: PAIN MANAGEMENT | Age: 56
End: 2021-09-10
Payer: COMMERCIAL

## 2021-09-10 VITALS
OXYGEN SATURATION: 98 % | SYSTOLIC BLOOD PRESSURE: 125 MMHG | WEIGHT: 198.4 LBS | BODY MASS INDEX: 28.47 KG/M2 | HEART RATE: 78 BPM | TEMPERATURE: 98.4 F | DIASTOLIC BLOOD PRESSURE: 76 MMHG

## 2021-09-10 DIAGNOSIS — G89.4 CHRONIC PAIN SYNDROME: ICD-10-CM

## 2021-09-10 DIAGNOSIS — S33.5XXA LUMBAR SPRAIN, INITIAL ENCOUNTER: ICD-10-CM

## 2021-09-10 DIAGNOSIS — S33.5XXA SPRAIN OF LUMBAR REGION, INITIAL ENCOUNTER: ICD-10-CM

## 2021-09-10 DIAGNOSIS — S33.5XXA SPRAIN OF LOW BACK, INITIAL ENCOUNTER: ICD-10-CM

## 2021-09-10 DIAGNOSIS — S33.5XXA LOW BACK SPRAIN, INITIAL ENCOUNTER: ICD-10-CM

## 2021-09-10 DIAGNOSIS — M51.27 PROLAPSED LUMBOSACRAL INTERVERTEBRAL DISC: ICD-10-CM

## 2021-09-10 PROCEDURE — 99214 OFFICE O/P EST MOD 30 MIN: CPT | Performed by: INTERNAL MEDICINE

## 2021-09-10 RX ORDER — HYDROCODONE BITARTRATE AND ACETAMINOPHEN 7.5; 325 MG/1; MG/1
1 TABLET ORAL EVERY 6 HOURS PRN
Qty: 84 TABLET | Refills: 0 | Status: SHIPPED | OUTPATIENT
Start: 2021-09-10 | End: 2021-10-06 | Stop reason: SDUPTHER

## 2021-09-10 RX ORDER — MORPHINE SULFATE 30 MG/1
30 CAPSULE, EXTENDED RELEASE ORAL DAILY
Qty: 28 CAPSULE | Refills: 0 | Status: SHIPPED | OUTPATIENT
Start: 2021-09-10 | End: 2021-10-06 | Stop reason: SDUPTHER

## 2021-10-11 ENCOUNTER — OFFICE VISIT (OUTPATIENT)
Dept: PAIN MANAGEMENT | Age: 56
End: 2021-10-11
Payer: COMMERCIAL

## 2021-10-11 VITALS
WEIGHT: 196 LBS | HEART RATE: 77 BPM | SYSTOLIC BLOOD PRESSURE: 136 MMHG | BODY MASS INDEX: 28.12 KG/M2 | TEMPERATURE: 97.5 F | OXYGEN SATURATION: 97 % | DIASTOLIC BLOOD PRESSURE: 86 MMHG

## 2021-10-11 DIAGNOSIS — G89.4 CHRONIC PAIN SYNDROME: ICD-10-CM

## 2021-10-11 DIAGNOSIS — S33.5XXA LUMBAR SPRAIN, INITIAL ENCOUNTER: ICD-10-CM

## 2021-10-11 DIAGNOSIS — S33.5XXA SPRAIN OF LUMBAR REGION, INITIAL ENCOUNTER: ICD-10-CM

## 2021-10-11 DIAGNOSIS — M51.27 PROLAPSED LUMBOSACRAL INTERVERTEBRAL DISC: ICD-10-CM

## 2021-10-11 DIAGNOSIS — S33.5XXA SPRAIN OF LOW BACK, INITIAL ENCOUNTER: ICD-10-CM

## 2021-10-11 DIAGNOSIS — S33.5XXA LOW BACK SPRAIN, INITIAL ENCOUNTER: ICD-10-CM

## 2021-10-11 PROCEDURE — 99213 OFFICE O/P EST LOW 20 MIN: CPT | Performed by: INTERNAL MEDICINE

## 2021-10-11 RX ORDER — QUETIAPINE FUMARATE 25 MG/1
25-50 TABLET, FILM COATED ORAL NIGHTLY
Qty: 60 TABLET | Refills: 1 | Status: SHIPPED | OUTPATIENT
Start: 2021-10-11 | End: 2021-12-06 | Stop reason: SDUPTHER

## 2021-10-11 RX ORDER — MAGNESIUM OXIDE 400 MG/1
400 TABLET ORAL 2 TIMES DAILY
Qty: 60 TABLET | Refills: 1 | Status: SHIPPED | OUTPATIENT
Start: 2021-10-11 | End: 2022-01-03 | Stop reason: SDUPTHER

## 2021-10-11 RX ORDER — MELOXICAM 15 MG/1
15 TABLET ORAL DAILY PRN
Qty: 30 TABLET | Refills: 1 | Status: SHIPPED | OUTPATIENT
Start: 2021-10-11 | End: 2021-12-06 | Stop reason: SDUPTHER

## 2021-10-11 RX ORDER — HYDROCODONE BITARTRATE AND ACETAMINOPHEN 7.5; 325 MG/1; MG/1
1 TABLET ORAL EVERY 6 HOURS PRN
Qty: 84 TABLET | Refills: 0 | Status: SHIPPED | OUTPATIENT
Start: 2021-10-11 | End: 2021-11-08 | Stop reason: SDUPTHER

## 2021-10-11 RX ORDER — MORPHINE SULFATE 30 MG/1
30 CAPSULE, EXTENDED RELEASE ORAL DAILY
Qty: 28 CAPSULE | Refills: 0 | Status: SHIPPED | OUTPATIENT
Start: 2021-10-11 | End: 2021-11-08 | Stop reason: SDUPTHER

## 2021-10-11 NOTE — PROGRESS NOTES
Natasha Altamirano  1965  [id number]      HISTORY OF PRESENT ILLNESS:  Mr. Juan Manuel Cha is a 60 y.o. male returns for a follow up visit for pain management  He has a diagnosis of   1. BWC Lumbar sprain, initial encounter    2. bwc-Prolapsed lumbosacral intervertebral disc    3. BWC Sprain of lumbar region, initial encounter    4. BWC Low back sprain, initial encounter    5. BWC Sprain of low back, initial encounter    . He complains of pain in the lower back with radiation to the left knee He rates the pain 7/10 and describes it as aching, burning, numbness. Current treatment regimen has helped relieve about 60% of the pain. He denies any side effects from the current pain regimen. Patient reports that since the last follow up visit the physical functioning is worse, family/social relationships are unchanged, mood is unchanged sleep patterns are worse, and that the overall functioning is unchanged. Patient denies misusing/abusing his narcotic pain medications or using any illegal drugs. There are No indicators for possible drug abuse, addiction or diversion problems, patient states he has been doing fair. Mr. Juan Manuel Cha reports he is working full time still. He states he is using Jackie along with Norco 2-3 per day. Patient denies any constipation symptoms. He states he is using Mobic also along with the other adjuvants. ALLERGIES: Patients list of allergies were reviewed     MEDICATIONS: Mr. Juan Manuel Cha list of medications were reviewed. His current medications are   Outpatient Medications Prior to Visit   Medication Sig Dispense Refill    QUEtiapine (SEROQUEL) 25 MG tablet Take 1-2 tablets by mouth nightly 60 tablet 1    meloxicam (MOBIC) 15 MG tablet Take 1 tablet by mouth daily as needed for Pain 30 tablet 1    DULoxetine (CYMBALTA) 30 MG extended release capsule Take 1 capsule by mouth daily 30 capsule 1    magnesium oxide (MAG-OX) 400 MG tablet Take one tablet Po BID 60 tablet 1    NOVOLOG 100 UNIT/ML injection continuous. Insulin pump averages 50-80 units daily      aspirin 325 MG tablet Take 325 mg by mouth daily.  carvedilol (COREG) 6.25 MG tablet Take 6.25 mg by mouth 2 times daily (with meals).  atorvastatin (LIPITOR) 80 MG tablet Take 80 mg by mouth nightly.  clopidogrel (PLAVIX) 75 MG tablet Take 75 mg by mouth daily.  lisinopril (PRINIVIL;ZESTRIL) 10 MG tablet Take 10 mg by mouth daily. No facility-administered medications prior to visit. REVIEW OF SYSTEMS:    Respiratory: Negative for apnea, chest tightness and shortness of breath or change in baseline breathing. PHYSICAL EXAM:   Nursing note and vitals reviewed. /86   Pulse 77   Temp 97.5 °F (36.4 °C) (Temporal)   Wt 196 lb (88.9 kg)   SpO2 97%   BMI 28.12 kg/m²   Constitutional: He appears well-developed and well-nourished. No acute distress. Cardiovascular: Normal rate, regular rhythm, normal heart sounds, and does not have murmur. Pulmonary/Chest: Effort normal. No respiratory distress. He does not have wheezes in the lung fields. He has no rales. Neurological/Psychiatric:He is alert and oriented to person, place, and time. Coordination is  normal.  His mood isAppropriate and affect is Neutral/Euthymic(normal) . His    IMPRESSION:   1. BWC Lumbar sprain, initial encounter    2. bwc-Prolapsed lumbosacral intervertebral disc    3. BWC Sprain of lumbar region, initial encounter    4. BWC Low back sprain, initial encounter    5. BWC Sprain of low back, initial encounter    6. Chronic pain syndrome    7. Lumbar sprain, initial encounter    8. Sprain of low back, initial encounter        PLAN:  Informed verbal consent was obtained  -[place] was obtained and reviewed  for the last one year and no indicators of drug misuse  were found. Any other controlled substance prescriptions  seen on the record have been accounted for, I am aware of the patient receiving these medications. Gavin Rai  [place] record will be rechecked as part of office protocol. -ROM/Stretching exercises as advised   -He was advised to increase fluids ( 5-7  glasses of fluid daily), limit caffeine, avoid cheese products, increase dietary fiber, increase activity and exercise as tolerated and relax regularly and enjoy meals   -Walking 20 minutes daily as tolerated   -Adv Biofeedback, relaxation and meditation techniques. Referral to psychologist for CBT was also discussed with patient   -Continue with Jackie along with Norco 3 per day     Current Outpatient Medications   Medication Sig Dispense Refill    QUEtiapine (SEROQUEL) 25 MG tablet Take 1-2 tablets by mouth nightly 60 tablet 1    meloxicam (MOBIC) 15 MG tablet Take 1 tablet by mouth daily as needed for Pain 30 tablet 1    DULoxetine (CYMBALTA) 30 MG extended release capsule Take 1 capsule by mouth daily 30 capsule 1    magnesium oxide (MAG-OX) 400 MG tablet Take one tablet Po BID 60 tablet 1    NOVOLOG 100 UNIT/ML injection continuous. Insulin pump averages 50-80 units daily      aspirin 325 MG tablet Take 325 mg by mouth daily.  carvedilol (COREG) 6.25 MG tablet Take 6.25 mg by mouth 2 times daily (with meals).  atorvastatin (LIPITOR) 80 MG tablet Take 80 mg by mouth nightly.  clopidogrel (PLAVIX) 75 MG tablet Take 75 mg by mouth daily.  lisinopril (PRINIVIL;ZESTRIL) 10 MG tablet Take 10 mg by mouth daily. No current facility-administered medications for this visit. I will continue his current medication regimen  which is part of the above treatment schedule. It has been helping with Mr. Cobian Lower chronic  medical problems which for this visit include:   Diagnoses of BWC Lumbar sprain, initial encounter, bwc-Prolapsed lumbosacral intervertebral disc, BWC Sprain of lumbar region, initial encounter, BWC Low back sprain, initial encounter, and BWC Sprain of low back, initial encounter were pertinent to this visit.    Risks and benefits of the medications and other alternative treatments  including no treatment were discussed with the patient. The common side effects of these medications were also explained to the patient. Informed verbal consent was obtained. Goals of current treatment regimen include improvement in pain, restoration of functioning- with focus on improvement in physical performance, general activity, work or disability,emotional distress, health care utilization and  decreased medication consumption. Will continue to monitor progress towards achieving/maintaining therapeutic goals with special emphasis on  1. Improvement in perceived interfernce  of pain with ADL's. Ability to do home exercises independently. Ability to do household chores indoor and/or outdoor work and social and leisure activities. Improve psychosocial and physical functioning. - he is showing progression towards this treatment goal with the current regimen. He was advised against drinking alcohol with the narcotic pain medicines, advised against driving or handling machinery while adjusting the dose of medicines or if having cognitive  issues related to the current medications. Risk of overdose and death, if medicines not taken as prescribed, were also discussed. If the patient develops new symptoms or if the symptoms worsen, the patient should call the office. While transcribing every attempt was made to maintain the accuracy of the note in terms of it's contents,there may have been some errors made inadvertently. Thank you for allowing me to participate in the care of this patient.     Tonie Grayson MD.    Cc: yMah Mcmillan MD

## 2021-11-08 ENCOUNTER — OFFICE VISIT (OUTPATIENT)
Dept: PAIN MANAGEMENT | Age: 56
End: 2021-11-08
Payer: COMMERCIAL

## 2021-11-08 VITALS
DIASTOLIC BLOOD PRESSURE: 77 MMHG | BODY MASS INDEX: 27.75 KG/M2 | OXYGEN SATURATION: 97 % | HEART RATE: 71 BPM | WEIGHT: 193.4 LBS | SYSTOLIC BLOOD PRESSURE: 123 MMHG

## 2021-11-08 DIAGNOSIS — M51.27 PROLAPSED LUMBOSACRAL INTERVERTEBRAL DISC: ICD-10-CM

## 2021-11-08 DIAGNOSIS — S33.5XXA SPRAIN OF LOW BACK, INITIAL ENCOUNTER: ICD-10-CM

## 2021-11-08 DIAGNOSIS — G89.4 CHRONIC PAIN SYNDROME: ICD-10-CM

## 2021-11-08 DIAGNOSIS — S33.5XXA LUMBAR SPRAIN, INITIAL ENCOUNTER: ICD-10-CM

## 2021-11-08 DIAGNOSIS — S33.5XXA SPRAIN OF LUMBAR REGION, INITIAL ENCOUNTER: ICD-10-CM

## 2021-11-08 DIAGNOSIS — S33.5XXA LOW BACK SPRAIN, INITIAL ENCOUNTER: ICD-10-CM

## 2021-11-08 PROCEDURE — 99214 OFFICE O/P EST MOD 30 MIN: CPT | Performed by: INTERNAL MEDICINE

## 2021-11-08 RX ORDER — HYDROCODONE BITARTRATE AND ACETAMINOPHEN 7.5; 325 MG/1; MG/1
1 TABLET ORAL EVERY 6 HOURS PRN
Qty: 84 TABLET | Refills: 0 | Status: SHIPPED | OUTPATIENT
Start: 2021-11-08 | End: 2021-12-06 | Stop reason: SDUPTHER

## 2021-11-08 RX ORDER — MORPHINE SULFATE 30 MG/1
30 CAPSULE, EXTENDED RELEASE ORAL DAILY
Qty: 28 CAPSULE | Refills: 0 | Status: SHIPPED | OUTPATIENT
Start: 2021-11-08 | End: 2021-12-06 | Stop reason: SDUPTHER

## 2021-11-08 NOTE — PROGRESS NOTES
Nataliya Kin  1965  [id number]    HISTORY OF PRESENT ILLNESS:  Mr. Almita Vences is a 60 y.o. male returns for a follow up visit for multiple medical problems. His current presenting problems are   1. BWC Lumbar sprain, initial encounter    2. BWC Low back sprain, initial encounter    3. bwc-Prolapsed lumbosacral intervertebral disc    4. BWC Sprain of low back, initial encounter    5. BWC Sprain of lumbar region, initial encounter    . As per information/history obtained from the PADT(patient assessment and documentation tool) - He complains of pain in the lower back with radiation to the lower leg Left He rates the pain 6/10 and describes it as burning, numbness, pins and needles. Pain is made worse by: movement, walking, standing, bending, lifting. Current treatment regimen has helped relieve about 60% of the pain. He denies side effects from the current pain regimen. Patient reports that since the last follow up visit the physical functioning is unchanged, family/social relationships are unchanged, mood is unchanged and sleep patterns are worse, and that the overall functioning is worse. Patient denies neurological bowel or bladder. Patient denies misusing/abusing his narcotic pain medications or using any illegal drugs. There are No indicators for possible drug abuse, addiction or diversion problems. Upon obtaining the medical history from Mr. Almita Vences regarding today's office visit for his presenting problems, patient states he is having increase pain. He complains of increased pain with changes in weather. Extreme temperatures- cold and damp weather causes increased pain. He says he is working full time. He mentions he using Jackie along with Norco and also on Mobic. He reports he is trying to loose weight. Patient states his sleep is fair. Has fairly normal sleep latency. Averages about 4-6 hours of sleep a night. Denies any signs of sleep apnea. Feels somewhat rested in the morning.   He states he is using Seroquel 25 mg 1-2 at night. Patient's  subjective report of his mood is fair. he describes occasional symptoms of depression, occasional  irritability and some mood swings. Describes his mood as being neutral and reports some pleasure in his daily activities. Reports  fair  appetite, energy and concentration. Able to function well in different aspects of his daily activities. Denies suicidal or homicidal ideation. Denies any complaints of increased tension, does   Worry sometimes and occasional  irritability  he denies any c/o increased anxiety, No c/o panic attacks or symptoms of PTSD. He mentions his on Cymbalta 30 mg per day. ALLERGIES/PAST MED/FAM/SOC HISTORY: Mr. Almita Vences allergies, past medical, family and social history were reviewed in the chart. Mr. Almita Vences current medications are   Outpatient Medications Prior to Visit   Medication Sig Dispense Refill    morphine (LAURA) 30 MG extended release capsule Take 1 capsule by mouth daily for 28 days. 28 capsule 0    HYDROcodone-acetaminophen (NORCO) 7.5-325 MG per tablet Take 1 tablet by mouth every 6 hours as needed for Pain (max 3 per day) for up to 28 days. 84 tablet 0    QUEtiapine (SEROQUEL) 25 MG tablet Take 1-2 tablets by mouth nightly 60 tablet 1    meloxicam (MOBIC) 15 MG tablet Take 1 tablet by mouth daily as needed for Pain 30 tablet 1    magnesium oxide (MAG-OX) 400 MG tablet Take 1 tablet by mouth 2 times daily 60 tablet 1    DULoxetine (CYMBALTA) 30 MG extended release capsule Take 1 capsule by mouth daily 30 capsule 1    NOVOLOG 100 UNIT/ML injection continuous. Insulin pump averages 50-80 units daily      aspirin 325 MG tablet Take 325 mg by mouth daily.  carvedilol (COREG) 6.25 MG tablet Take 6.25 mg by mouth 2 times daily (with meals).  atorvastatin (LIPITOR) 80 MG tablet Take 80 mg by mouth nightly.  clopidogrel (PLAVIX) 75 MG tablet Take 75 mg by mouth daily.       lisinopril (PRINIVIL;ZESTRIL) 10 MG tablet Take 10 mg by mouth daily. No facility-administered medications prior to visit. REVIEW OF SYSTEMS:     Respiratory: Negative for shortness of breath. Cardiovascular: Negative for chest pain, palpitations  Gastrointestinal: Negative for blood in stool, abdominal distention, nausea, vomiting, abdominal pain, diarrhea,constipation. Neurological: Negative for speech difficulty, weakness and light-headedness, dizziness, tremors, sleepiness  Psychiatric/Behavioral: Negative for suicidal ideas, hallucinations, behavioral problems, self-injury, decreased concentration/cognition, agitation, confusion. PHYSICAL EXAM:   Nursing note and vitals reviewed. /77   Pulse 71   Wt 193 lb 6.4 oz (87.7 kg)   SpO2 97%   BMI 27.75 kg/m²   General Appearance: Patient is well nourished, well developed, well groomed and in no acute distress. Skin: Skin is warm and dry, good turgor . No rash or lesions noted. He is not diaphoretic. Pulmonary/Chest: Effort normal. No respiratory distress or use of accessory muscles. Auscultation revealing normal air entry. He does not have wheezes in the lung fields. He has no rales. Cardiovascular: Normal rate, regular rhythm, normal heart sounds, and does not have murmur. Exam reveals no gallop and no friction rub. Musculoskeletal / Extremities: Range of motion is normal. Gait is normal, assistive devices use: none. He exhibits edema: none, and no tenderness. Neurological/Psychiatric:He is alert and oriented to person, place, and time. Coordination is  normal.   Judgement and Insight is normal  His mood is Appropriate and affect is Neutral/Euthymic(normal) . His behavior is normal.   thought content normal.        IMPRESSION:     1. BWC Lumbar sprain, initial encounter    2. BWC Low back sprain, initial encounter    3. bwc-Prolapsed lumbosacral intervertebral disc    4. BWC Sprain of low back, initial encounter    5.  BWC Sprain of lumbar region, initial encounter PLAN:  Informed verbal consent was obtained. -ROM/Stretching exercises as advised   -He was advised to increase fluids ( 5-7  glasses of fluid daily), limit caffeine, avoid cheese products, increase dietary fiber, increase activity and exercise as tolerated and relax regularly and enjoy meals   -he was advised proper sleep hygiene-told to avoid:use of caffeine or other stimulants after noon, alcohol use near bedtime, long or frequent naps during the day, erratic sleep schedule, heavy meals near bedtime, vigorous exercise near bedtime and use of electronic devices near bedtime   -Continue with Seroquel   -CBT techniques- relaxation therapies such as biofeedback, mindfulness based stress reduction, imagery, cognitive restructuring, problem solving discussed with patient   -Continue with Cymbalta  And Mobic 15 mg   -Continue with Jackie with Percocet for BTP 3 per day    Mr. Ankur Nesbitt will be prescribed  the medications  listed below which are for treatment of his presenting  medical problems which for this visit include:   Diagnoses of BWC Lumbar sprain, initial encounter, BWC Low back sprain, initial encounter, bwc-Prolapsed lumbosacral intervertebral disc, BWC Sprain of low back, initial encounter, and BWC Sprain of lumbar region, initial encounter were pertinent to this visit. Medications/orders associated with this visit:    Current Outpatient Medications   Medication Sig Dispense Refill    morphine (JACKIE) 30 MG extended release capsule Take 1 capsule by mouth daily for 28 days. 28 capsule 0    HYDROcodone-acetaminophen (NORCO) 7.5-325 MG per tablet Take 1 tablet by mouth every 6 hours as needed for Pain (max 3 per day) for up to 28 days.  84 tablet 0    QUEtiapine (SEROQUEL) 25 MG tablet Take 1-2 tablets by mouth nightly 60 tablet 1    meloxicam (MOBIC) 15 MG tablet Take 1 tablet by mouth daily as needed for Pain 30 tablet 1    magnesium oxide (MAG-OX) 400 MG tablet Take 1 tablet by mouth 2 times daily 60 tablet 1    DULoxetine (CYMBALTA) 30 MG extended release capsule Take 1 capsule by mouth daily 30 capsule 1    NOVOLOG 100 UNIT/ML injection continuous. Insulin pump averages 50-80 units daily      aspirin 325 MG tablet Take 325 mg by mouth daily.  carvedilol (COREG) 6.25 MG tablet Take 6.25 mg by mouth 2 times daily (with meals).  atorvastatin (LIPITOR) 80 MG tablet Take 80 mg by mouth nightly.  clopidogrel (PLAVIX) 75 MG tablet Take 75 mg by mouth daily.  lisinopril (PRINIVIL;ZESTRIL) 10 MG tablet Take 10 mg by mouth daily. No current facility-administered medications for this visit. Goals of current treatment regimen include improvement in pain, restoration of functioning- with focus on improvement in physical performance, general activity, work or disability,emotional distress, health care utilization and  decreased medication consumption. Will continue to monitor progress towards achieving/maintaining therapeutic goals with special emphasis on  1. Improvement in perceived interfernce  of pain with ADL's. Ability to do home exercises independently. Ability to do household chores indoor and/or outdoor work and social and leisure activities. To increase flexibility/ROM, strength and endurance. Improve psychosocial and physical functioning.- he is showing progression towards this treatment goal with the current regimen. 2. Improving sleep to 6-7 hours a night. Improve mood/ anxiety and depression symptoms such as crying spells, low energy, problems with concentration, motivation.- he is showing progression towards this treatment goal with the current regimen. 3. Reduction of reliance on opioid analgesia/more appropriate opioid use. - he is showing progression towards this treatment goal with the current regimen. 4. Ability to focus/concentrate at work and perform the duties required of him at work  Sit through a workday without lower extremity symptoms.   Stand 30-60 minutes without lower extremity symptoms. Ability to lift up to 10-20 lbs. Ability to go up and down stairs. Sit 30-60 minutes  Without having to stand up frequently. - he is maintaining/progressing towards his work related goals with the current regimen. Risks and benefits of the medications and other alternative treatments have been/were  discussed with the patient. Any questions on the  common side effects of these medications were also answered. He was advised against drinking alcohol with the narcotic pain medicines, advised against driving or handling machinery when  starting or adjusting the dose of medicines, feeling groggy or drowsy, or if having any cognitive issues related to the current medications. Heis fully aware of the risk of overdose and death, if medicines are misused and not taken as prescribed. If he develops new symptoms or if the symptoms worsen, he was told to call the office. .  Thank you for allowing me to participate in the care of this patient.     Marya Toledo MD.    Cc: Luzmaria Patino MD

## 2021-12-06 ENCOUNTER — OFFICE VISIT (OUTPATIENT)
Dept: PAIN MANAGEMENT | Age: 56
End: 2021-12-06
Payer: COMMERCIAL

## 2021-12-06 VITALS
WEIGHT: 194 LBS | HEIGHT: 70 IN | OXYGEN SATURATION: 98 % | HEART RATE: 75 BPM | SYSTOLIC BLOOD PRESSURE: 138 MMHG | BODY MASS INDEX: 27.77 KG/M2 | DIASTOLIC BLOOD PRESSURE: 82 MMHG | TEMPERATURE: 98.1 F

## 2021-12-06 DIAGNOSIS — S33.5XXA LUMBAR SPRAIN, INITIAL ENCOUNTER: ICD-10-CM

## 2021-12-06 DIAGNOSIS — S33.5XXA SPRAIN OF LUMBAR REGION, INITIAL ENCOUNTER: ICD-10-CM

## 2021-12-06 DIAGNOSIS — S33.5XXA SPRAIN OF LOW BACK, INITIAL ENCOUNTER: ICD-10-CM

## 2021-12-06 DIAGNOSIS — M51.27 PROLAPSED LUMBOSACRAL INTERVERTEBRAL DISC: ICD-10-CM

## 2021-12-06 DIAGNOSIS — S33.5XXA LOW BACK SPRAIN, INITIAL ENCOUNTER: ICD-10-CM

## 2021-12-06 DIAGNOSIS — G89.4 CHRONIC PAIN SYNDROME: ICD-10-CM

## 2021-12-06 PROCEDURE — 99214 OFFICE O/P EST MOD 30 MIN: CPT | Performed by: INTERNAL MEDICINE

## 2021-12-06 RX ORDER — MORPHINE SULFATE 30 MG/1
30 CAPSULE, EXTENDED RELEASE ORAL DAILY
Qty: 28 CAPSULE | Refills: 0 | Status: SHIPPED | OUTPATIENT
Start: 2021-12-06 | End: 2022-01-03 | Stop reason: SDUPTHER

## 2021-12-06 RX ORDER — HYDROCODONE BITARTRATE AND ACETAMINOPHEN 7.5; 325 MG/1; MG/1
1 TABLET ORAL EVERY 6 HOURS PRN
Qty: 84 TABLET | Refills: 0 | Status: SHIPPED | OUTPATIENT
Start: 2021-12-06 | End: 2022-01-03 | Stop reason: SDUPTHER

## 2021-12-06 RX ORDER — QUETIAPINE FUMARATE 25 MG/1
25-50 TABLET, FILM COATED ORAL NIGHTLY
Qty: 60 TABLET | Refills: 1 | Status: SHIPPED | OUTPATIENT
Start: 2021-12-06 | End: 2022-01-03 | Stop reason: SDUPTHER

## 2021-12-06 RX ORDER — MELOXICAM 15 MG/1
15 TABLET ORAL DAILY PRN
Qty: 30 TABLET | Refills: 1 | Status: SHIPPED | OUTPATIENT
Start: 2021-12-06 | End: 2022-01-03 | Stop reason: SDUPTHER

## 2021-12-06 RX ORDER — GABAPENTIN 400 MG/1
400 CAPSULE ORAL 3 TIMES DAILY
Qty: 90 CAPSULE | Refills: 0 | Status: SHIPPED | OUTPATIENT
Start: 2021-12-06 | End: 2022-01-03 | Stop reason: SDUPTHER

## 2021-12-06 NOTE — PROGRESS NOTES
Jamal Manning  1965  [id number]    HISTORY OF PRESENT ILLNESS:  Mr. Ramin Agustin is a 60 y.o. male returns for a follow up visit for multiple medical problems. His current presenting problems are   1. BWC Lumbar sprain, initial encounter    2. BWC Sprain of low back, initial encounter    3. BWC Low back sprain, initial encounter    4. BWC Sprain of lumbar region, initial encounter    5. bwc-Prolapsed lumbosacral intervertebral disc    . As per information/history obtained from the PADT(patient assessment and documentation tool) - He complains of pain in the lower back and knees Left with radiation to the knees Left He rates the pain 8/10 and describes it as aching, burning and stabbing. Pain is made worse by: walking, standing, bending, lifting. Current treatment regimen has helped relieve about 50% of the pain. He denies side effects from the current pain regimen. Patient reports that since the last follow up visit the physical functioning is worse, family/social relationships are unchanged, mood is unchanged and sleep patterns are worse, and that the overall functioning is worse. Patient denies neurological bowel or bladder. Patient denies misusing/abusing his narcotic pain medications or using any illegal drugs. There are No indicators for possible drug abuse, addiction or diversion problems. Upon obtaining the medical history from Mr. Ramin Agustin regarding today's office visit for his presenting problems, patient states he is having excruciating pain in the mid back going across the chest wall, it is present on both sides. Mr. Ramin Agustin states his pain gets worse on movement. There is no evidence of any skin lesion. He mentions he is using Jackie along with Norco and Mobic. Patient reports he saw his PCP who ordered CT scan of the lungs, all was okay. Patient states his sleep is fair. Has fairly normal sleep latency. Averages about 4-6 hours of sleep a night. Denies any signs of sleep apnea.  Feels somewhat rested in the morning. Patient's  subjective report of his mood is fair. he describes occasional symptoms of depression, occasional  irritability and some mood swings. Describes his mood as being neutral and reports some pleasure in his daily activities. Reports  fair  appetite, energy and concentration. Able to function well in different aspects of his daily activities. Denies suicidal or homicidal ideation. Denies any complaints of increased tension, does   Worry sometimes and occasional  irritability  he denies any c/o increased anxiety, No c/o panic attacks or symptoms of PTSD. Patient has a history of Cardiac issues. ALLERGIES/PAST MED/FAM/SOC HISTORY: Mr. Steven Ba allergies, past medical, family and social history were reviewed in the chart. Mr. Steven Ba current medications are   Outpatient Medications Prior to Visit   Medication Sig Dispense Refill    morphine (LAURA) 30 MG extended release capsule Take 1 capsule by mouth daily for 28 days. 28 capsule 0    HYDROcodone-acetaminophen (NORCO) 7.5-325 MG per tablet Take 1 tablet by mouth every 6 hours as needed for Pain (max 3 per day) for up to 28 days. 84 tablet 0    QUEtiapine (SEROQUEL) 25 MG tablet Take 1-2 tablets by mouth nightly 60 tablet 1    meloxicam (MOBIC) 15 MG tablet Take 1 tablet by mouth daily as needed for Pain 30 tablet 1    magnesium oxide (MAG-OX) 400 MG tablet Take 1 tablet by mouth 2 times daily 60 tablet 1    DULoxetine (CYMBALTA) 30 MG extended release capsule Take 1 capsule by mouth daily 30 capsule 1    NOVOLOG 100 UNIT/ML injection continuous. Insulin pump averages 50-80 units daily      aspirin 325 MG tablet Take 325 mg by mouth daily.  carvedilol (COREG) 6.25 MG tablet Take 6.25 mg by mouth 2 times daily (with meals).  atorvastatin (LIPITOR) 80 MG tablet Take 80 mg by mouth nightly.  clopidogrel (PLAVIX) 75 MG tablet Take 75 mg by mouth daily.       lisinopril (PRINIVIL;ZESTRIL) 10 MG tablet Take 10 mg by mouth daily.       No facility-administered medications prior to visit. REVIEW OF SYSTEMS:     Respiratory: Negative for shortness of breath. Cardiovascular: Negative for chest pain, palpitations  Gastrointestinal: Negative for blood in stool, abdominal distention, nausea, vomiting, abdominal pain, diarrhea,constipation. Neurological: Negative for speech difficulty, weakness and light-headedness, dizziness, tremors, sleepiness  Psychiatric/Behavioral: Negative for suicidal ideas, hallucinations, behavioral problems, self-injury, decreased concentration/cognition, agitation, confusion. PHYSICAL EXAM:   Nursing note and vitals reviewed. /82   Pulse 75   Temp 98.1 °F (36.7 °C)   Ht 5' 10\" (1.778 m)   Wt 194 lb (88 kg)   SpO2 98%   BMI 27.84 kg/m²   General Appearance: Patient is well nourished, well developed, well groomed and in no acute distress. Skin: Skin is warm and dry, good turgor . No rash or lesions noted. He is not diaphoretic. Pulmonary/Chest: Effort normal. No respiratory distress or use of accessory muscles. Auscultation revealing normal air entry. He does not have wheezes in the lung fields. He has no rales. Cardiovascular: Normal rate, regular rhythm, normal heart sounds, and does not have murmur. Exam reveals no gallop and no friction rub. Musculoskeletal / Extremities: Range of motion is normal. Gait is normal, assistive devices use: none. He exhibits edema: none, and no tenderness. Neurological/Psychiatric:He is alert and oriented to person, place, and time. Coordination is  normal.   Judgement and Insight is normal  His mood is Appropriate and affect is Neutral/Euthymic(normal) . His behavior is normal.   thought content normal.        IMPRESSION:     1. BWC Lumbar sprain, initial encounter    2. BWC Sprain of low back, initial encounter    3. BWC Low back sprain, initial encounter    4.  BWC Sprain of lumbar region, initial encounter    5. bwc-Prolapsed lumbosacral intervertebral disc    6. Chronic pain syndrome    7. Lumbar sprain, initial encounter    8. Sprain of low back, initial encounter        PLAN:  Informed verbal consent was obtained.  -Interm history reviewed    -CT of lungs reviewed   -Order xray of T/L spine for evaluation   -Follow up with Cardiology also regarding roll out angina   -Start Neurontin 400 mg then increase to 1200 mg  -He was advised to increase fluids ( 5-7  glasses of fluid daily), limit caffeine, avoid cheese products, increase dietary fiber, increase activity and exercise as tolerated and relax regularly and enjoy meals   -he was advised proper sleep hygiene-told to avoid:use of caffeine or other stimulants after noon, alcohol use near bedtime, long or frequent naps during the day, erratic sleep schedule, heavy meals near bedtime, vigorous exercise near bedtime and use of electronic devices near bedtime   -Continue with Seroquel   -CBT techniques- relaxation therapies such as biofeedback, mindfulness based stress reduction, imagery, cognitive restructuring, problem solving discussed with patient   -Continue with Alicia    Mr. Flori Velazquez will be prescribed  the medications  listed below which are for treatment of his presenting  medical problems which for this visit include:   Diagnoses of BWC Lumbar sprain, initial encounter, BWC Sprain of low back, initial encounter, BWC Low back sprain, initial encounter, BWC Sprain of lumbar region, initial encounter, and bwc-Prolapsed lumbosacral intervertebral disc were pertinent to this visit. Medications/orders associated with this visit:    Current Outpatient Medications   Medication Sig Dispense Refill    morphine (LAURA) 30 MG extended release capsule Take 1 capsule by mouth daily for 28 days. 28 capsule 0    HYDROcodone-acetaminophen (NORCO) 7.5-325 MG per tablet Take 1 tablet by mouth every 6 hours as needed for Pain (max 3 per day) for up to 28 days.  84 tablet 0    QUEtiapine (SEROQUEL) 25 MG tablet Take 1-2 tablets by mouth nightly 60 tablet 1    meloxicam (MOBIC) 15 MG tablet Take 1 tablet by mouth daily as needed for Pain 30 tablet 1    magnesium oxide (MAG-OX) 400 MG tablet Take 1 tablet by mouth 2 times daily 60 tablet 1    DULoxetine (CYMBALTA) 30 MG extended release capsule Take 1 capsule by mouth daily 30 capsule 1    NOVOLOG 100 UNIT/ML injection continuous. Insulin pump averages 50-80 units daily      aspirin 325 MG tablet Take 325 mg by mouth daily.  carvedilol (COREG) 6.25 MG tablet Take 6.25 mg by mouth 2 times daily (with meals).  atorvastatin (LIPITOR) 80 MG tablet Take 80 mg by mouth nightly.  clopidogrel (PLAVIX) 75 MG tablet Take 75 mg by mouth daily.  lisinopril (PRINIVIL;ZESTRIL) 10 MG tablet Take 10 mg by mouth daily. No current facility-administered medications for this visit. Goals of current treatment regimen include improvement in pain, restoration of functioning- with focus on improvement in physical performance, general activity, work or disability,emotional distress, health care utilization and  decreased medication consumption. Will continue to monitor progress towards achieving/maintaining therapeutic goals with special emphasis on  1. Improvement in perceived interfernce  of pain with ADL's. Ability to do home exercises independently. Ability to do household chores indoor and/or outdoor work and social and leisure activities. To increase flexibility/ROM, strength and endurance. Improve psychosocial and physical functioning.- he is not showing any significant progress/or showing regression  towards this goal and reassessment and adjustment of goals/treatment have been made. 2. Improving sleep to 6-7 hours a night. Improve mood/ anxiety and depression symptoms such as crying spells, low energy, problems with concentration, motivation.- he is showing progression towards this treatment goal with the current regimen.    3. Reduction of reliance on opioid analgesia/more appropriate opioid use. - he is showing progression towards this treatment goal with the current regimen. Risks and benefits of the medications and other alternative treatments have been/were  discussed with the patient. Any questions on the  common side effects of these medications were also answered. He was advised against drinking alcohol with the narcotic pain medicines, advised against driving or handling machinery when  starting or adjusting the dose of medicines, feeling groggy or drowsy, or if having any cognitive issues related to the current medications. Heis fully aware of the risk of overdose and death, if medicines are misused and not taken as prescribed. If he develops new symptoms or if the symptoms worsen, he was told to call the office. .  Thank you for allowing me to participate in the care of this patient.     Apollo Levy MD.    Cc: Claire Ratliff MD

## 2022-01-03 ENCOUNTER — OFFICE VISIT (OUTPATIENT)
Dept: PAIN MANAGEMENT | Age: 57
End: 2022-01-03
Payer: COMMERCIAL

## 2022-01-03 VITALS
BODY MASS INDEX: 28.06 KG/M2 | OXYGEN SATURATION: 98 % | HEIGHT: 70 IN | HEART RATE: 70 BPM | RESPIRATION RATE: 16 BRPM | WEIGHT: 196 LBS | DIASTOLIC BLOOD PRESSURE: 82 MMHG | SYSTOLIC BLOOD PRESSURE: 143 MMHG

## 2022-01-03 DIAGNOSIS — S33.5XXA LUMBAR SPRAIN, INITIAL ENCOUNTER: ICD-10-CM

## 2022-01-03 DIAGNOSIS — S33.5XXA SPRAIN OF LOW BACK, INITIAL ENCOUNTER: ICD-10-CM

## 2022-01-03 DIAGNOSIS — M51.27 PROLAPSED LUMBOSACRAL INTERVERTEBRAL DISC: ICD-10-CM

## 2022-01-03 DIAGNOSIS — S33.5XXA LOW BACK SPRAIN, INITIAL ENCOUNTER: ICD-10-CM

## 2022-01-03 DIAGNOSIS — G89.4 CHRONIC PAIN SYNDROME: ICD-10-CM

## 2022-01-03 DIAGNOSIS — S33.5XXA SPRAIN OF LUMBAR REGION, INITIAL ENCOUNTER: ICD-10-CM

## 2022-01-03 PROCEDURE — 99213 OFFICE O/P EST LOW 20 MIN: CPT | Performed by: INTERNAL MEDICINE

## 2022-01-03 RX ORDER — DULOXETIN HYDROCHLORIDE 30 MG/1
30 CAPSULE, DELAYED RELEASE ORAL DAILY
Qty: 30 CAPSULE | Refills: 1 | Status: SHIPPED | OUTPATIENT
Start: 2022-01-03 | End: 2022-01-31 | Stop reason: SDUPTHER

## 2022-01-03 RX ORDER — ESZOPICLONE 2 MG/1
2 TABLET, FILM COATED ORAL NIGHTLY
Qty: 30 TABLET | Refills: 0 | Status: SHIPPED | OUTPATIENT
Start: 2022-01-03 | End: 2022-01-31

## 2022-01-03 RX ORDER — GABAPENTIN 400 MG/1
400 CAPSULE ORAL 3 TIMES DAILY
Qty: 90 CAPSULE | Refills: 0 | Status: SHIPPED | OUTPATIENT
Start: 2022-01-03 | End: 2022-04-18 | Stop reason: SDUPTHER

## 2022-01-03 RX ORDER — MAGNESIUM OXIDE 400 MG/1
400 TABLET ORAL 2 TIMES DAILY
Qty: 60 TABLET | Refills: 1 | Status: SHIPPED | OUTPATIENT
Start: 2022-01-03 | End: 2022-04-18 | Stop reason: SDUPTHER

## 2022-01-03 RX ORDER — HYDROCODONE BITARTRATE AND ACETAMINOPHEN 7.5; 325 MG/1; MG/1
1 TABLET ORAL EVERY 6 HOURS PRN
Qty: 84 TABLET | Refills: 0 | Status: SHIPPED | OUTPATIENT
Start: 2022-01-03 | End: 2022-01-31 | Stop reason: SDUPTHER

## 2022-01-03 RX ORDER — MORPHINE SULFATE 30 MG/1
30 CAPSULE, EXTENDED RELEASE ORAL DAILY
Qty: 28 CAPSULE | Refills: 0 | Status: SHIPPED | OUTPATIENT
Start: 2022-01-03 | End: 2022-01-31 | Stop reason: SDUPTHER

## 2022-01-03 RX ORDER — MELOXICAM 15 MG/1
15 TABLET ORAL DAILY PRN
Qty: 30 TABLET | Refills: 1 | Status: SHIPPED | OUTPATIENT
Start: 2022-01-03 | End: 2022-04-18 | Stop reason: SDUPTHER

## 2022-01-03 RX ORDER — QUETIAPINE FUMARATE 25 MG/1
25-50 TABLET, FILM COATED ORAL NIGHTLY
Qty: 60 TABLET | Refills: 1 | Status: SHIPPED | OUTPATIENT
Start: 2022-01-03 | End: 2022-02-18 | Stop reason: SDUPTHER

## 2022-01-03 NOTE — PROGRESS NOTES
Dallie Gowers  1965  [id number]    HISTORY OF PRESENT ILLNESS:  Mr. Cinthia Dasilva is a 60 y.o. male returns for a follow up visit for multiple medical problems. His current presenting problems are   1. BWC Lumbar sprain, initial encounter    2. BWC Low back sprain, initial encounter    3. bwc-Prolapsed lumbosacral intervertebral disc    4. Chronic pain syndrome    5. BWC Sprain of lumbar region, initial encounter    6. BWC Sprain of low back, initial encounter    . As per information/history obtained from the PADT(patient assessment and documentation tool) - He complains of pain in the lower back and knees Left with radiation to the none He rates the pain 7/10 and describes it as aching, burning, numbness. Pain is made worse by: movement, walking, standing, bending. Current treatment regimen has helped relieve about 50% of the pain. He denies side effects from the current pain regimen. Patient reports that since the last follow up visit the physical functioning is unchanged, family/social relationships are unchanged, mood is unchanged and sleep patterns are worse, and that the overall functioning is worse. Patient denies neurological bowel or bladder. Patient denies misusing/abusing his narcotic pain medications or using any illegal drugs. There are No indicators for possible drug abuse, addiction or diversion problems. Upon obtaining the medical history from Mr. Cinthia Dasilva regarding today's office visit for his presenting problems, patient states he has been doing about the same. He says he is using Seroquel, which is not helping much with sleep. Sleep is poor,  poor sleep latency, averages 2-3 hours of sleep at night. Intermittent, non restorative. Does not feel rested in AM. Complains of feeling sleepy  during the day. He mentions she is using Jackie along with Norco for btp. Patient's  subjective report of his mood is fair.  he describes occasional symptoms of depression, occasional  irritability and some mood swings. Describes his mood as being neutral and reports some pleasure in his daily activities. Reports  fair  appetite, energy and concentration. Able to function well in different aspects of his daily activities. Denies suicidal or homicidal ideation. Denies any complaints of increased tension, does   Worry sometimes and occasional  irritability  he denies any c/o increased anxiety, No c/o panic attacks or symptoms of PTSD He says he is using Neurontin along with Mobic, He reports he is working full time. ALLERGIES/PAST MED/FAM/SOC HISTORY: Mr. Juliette Patel allergies, past medical, family and social history were reviewed in the chart. Mr. Juliette Patel current medications are   Outpatient Medications Prior to Visit   Medication Sig Dispense Refill    morphine (LAURA) 30 MG extended release capsule Take 1 capsule by mouth daily for 28 days. 28 capsule 0    HYDROcodone-acetaminophen (NORCO) 7.5-325 MG per tablet Take 1 tablet by mouth every 6 hours as needed for Pain (max 3 per day) for up to 28 days. 84 tablet 0    QUEtiapine (SEROQUEL) 25 MG tablet Take 1-2 tablets by mouth nightly 60 tablet 1    meloxicam (MOBIC) 15 MG tablet Take 1 tablet by mouth daily as needed for Pain 30 tablet 1    gabapentin (NEURONTIN) 400 MG capsule Take 1 capsule by mouth 3 times daily for 30 days. 90 capsule 0    magnesium oxide (MAG-OX) 400 MG tablet Take 1 tablet by mouth 2 times daily 60 tablet 1    DULoxetine (CYMBALTA) 30 MG extended release capsule Take 1 capsule by mouth daily 30 capsule 1    NOVOLOG 100 UNIT/ML injection continuous. Insulin pump averages 50-80 units daily      aspirin 325 MG tablet Take 325 mg by mouth daily.  carvedilol (COREG) 6.25 MG tablet Take 6.25 mg by mouth 2 times daily (with meals).  atorvastatin (LIPITOR) 80 MG tablet Take 80 mg by mouth nightly.  clopidogrel (PLAVIX) 75 MG tablet Take 75 mg by mouth daily.       lisinopril (PRINIVIL;ZESTRIL) 10 MG tablet Take 10 mg by mouth daily.       No facility-administered medications prior to visit. REVIEW OF SYSTEMS:     Respiratory: Negative for shortness of breath. Cardiovascular: Negative for chest pain, palpitations  Gastrointestinal: Negative for blood in stool, abdominal distention, nausea, vomiting, abdominal pain, diarrhea,constipation. Neurological: Negative for speech difficulty, weakness and light-headedness, dizziness, tremors, sleepiness  Psychiatric/Behavioral: Negative for suicidal ideas, hallucinations, behavioral problems, self-injury, decreased concentration/cognition, agitation, confusion. PHYSICAL EXAM:   Nursing note and vitals reviewed. BP (!) 143/82   Pulse 70   Resp 16   Ht 5' 10\" (1.778 m)   Wt 196 lb (88.9 kg)   SpO2 98%   BMI 28.12 kg/m²   General Appearance: Patient is well nourished, well developed, well groomed and in no acute distress. Skin: Skin is warm and dry, good turgor . No rash or lesions noted. He is not diaphoretic. Pulmonary/Chest: Effort normal. No respiratory distress or use of accessory muscles. Auscultation revealing normal air entry. He does not have wheezes in the lung fields. He has no rales. Cardiovascular: Normal rate, regular rhythm, normal heart sounds, and does not have murmur. Exam reveals no gallop and no friction rub. Musculoskeletal / Extremities: Range of motion is normal. Gait is normal, assistive devices use: none. He exhibits edema: none, and no tenderness. Neurological/Psychiatric:He is alert and oriented to person, place, and time. Coordination is  normal.   Judgement and Insight is normal  His mood is Appropriate and affect is Neutral/Euthymic(normal) . His behavior is normal.   thought content normal.        IMPRESSION:     1. BWC Lumbar sprain, initial encounter    2. BWC Low back sprain, initial encounter    3. bwc-Prolapsed lumbosacral intervertebral disc    4. Chronic pain syndrome    5. BWC Sprain of lumbar region, initial encounter    6. BWC Sprain of low back, initial encounter        PLAN:  Informed verbal consent was obtained. -he was advised proper sleep hygiene-told to avoid:use of caffeine or other stimulants after noon, alcohol use near bedtime, long or frequent naps during the day, erratic sleep schedule, heavy meals near bedtime, vigorous exercise near bedtime and use of electronic devices near bedtime   -Discontinue Seroquel, Start Luensta 2 mgs at night   -Continue with Laura along with Norco   -CBT techniques- relaxation therapies such as biofeedback, mindfulness based stress reduction, imagery, cognitive restructuring, problem solving discussed with patient   -Continue with Cymbalta   -Continue with Neurontin and Mobic   -He was advised to increase fluids ( 5-7  glasses of fluid daily), limit caffeine, avoid cheese products, increase dietary fiber, increase activity and exercise as tolerated and relax regularly and enjoy meals   -Urine drug screen with GC/MS for opiates and drugs of abuse was ordered and will follow up on results. Mr. Salvador Rene will be prescribed  the medications  listed below which are for treatment of his presenting  medical problems which for this visit include:   Diagnoses of BWC Lumbar sprain, initial encounter, BWC Low back sprain, initial encounter, bwc-Prolapsed lumbosacral intervertebral disc, Chronic pain syndrome, BWC Sprain of lumbar region, initial encounter, and BWC Sprain of low back, initial encounter were pertinent to this visit. Medications/orders associated with this visit:    Current Outpatient Medications   Medication Sig Dispense Refill    morphine (LAURA) 30 MG extended release capsule Take 1 capsule by mouth daily for 28 days. 28 capsule 0    HYDROcodone-acetaminophen (NORCO) 7.5-325 MG per tablet Take 1 tablet by mouth every 6 hours as needed for Pain (max 3 per day) for up to 28 days.  84 tablet 0    QUEtiapine (SEROQUEL) 25 MG tablet Take 1-2 tablets by mouth nightly 60 tablet 1    meloxicam (MOBIC) 15 MG tablet Take 1 tablet by mouth daily as needed for Pain 30 tablet 1    gabapentin (NEURONTIN) 400 MG capsule Take 1 capsule by mouth 3 times daily for 30 days. 90 capsule 0    magnesium oxide (MAG-OX) 400 MG tablet Take 1 tablet by mouth 2 times daily 60 tablet 1    DULoxetine (CYMBALTA) 30 MG extended release capsule Take 1 capsule by mouth daily 30 capsule 1    NOVOLOG 100 UNIT/ML injection continuous. Insulin pump averages 50-80 units daily      aspirin 325 MG tablet Take 325 mg by mouth daily.  carvedilol (COREG) 6.25 MG tablet Take 6.25 mg by mouth 2 times daily (with meals).  atorvastatin (LIPITOR) 80 MG tablet Take 80 mg by mouth nightly.  clopidogrel (PLAVIX) 75 MG tablet Take 75 mg by mouth daily.  lisinopril (PRINIVIL;ZESTRIL) 10 MG tablet Take 10 mg by mouth daily. No current facility-administered medications for this visit. Goals of current treatment regimen include improvement in pain, restoration of functioning- with focus on improvement in physical performance, general activity, work or disability,emotional distress, health care utilization and  decreased medication consumption. Will continue to monitor progress towards achieving/maintaining therapeutic goals with special emphasis on  1. Improvement in perceived interfernce  of pain with ADL's. Ability to do home exercises independently. Ability to do household chores indoor and/or outdoor work and social and leisure activities. To increase flexibility/ROM, strength and endurance. Improve psychosocial and physical functioning.- he is showing progression towards this treatment goal with the current regimen. 2. Improving sleep to 6-7 hours a night.  Improve mood/ anxiety and depression symptoms such as crying spells, low energy, problems with concentration, motivation.- he is not showing any significant progress/or showing regression  towards this goal and reassessment and adjustment of goals/treatment have been made. 3. Reduction of reliance on opioid analgesia/more appropriate opioid use. - he is showing progression towards this treatment goal with the current regimen. Risks and benefits of the medications and other alternative treatments have been/were  discussed with the patient. Any questions on the  common side effects of these medications were also answered. He was advised against drinking alcohol with the narcotic pain medicines, advised against driving or handling machinery when  starting or adjusting the dose of medicines, feeling groggy or drowsy, or if having any cognitive issues related to the current medications. Heis fully aware of the risk of overdose and death, if medicines are misused and not taken as prescribed. If he develops new symptoms or if the symptoms worsen, he was told to call the office. .  Thank you for allowing me to participate in the care of this patient.     Annalee Sherwood MD.    Cc: Davy Carranza MD

## 2022-01-31 ENCOUNTER — OFFICE VISIT (OUTPATIENT)
Dept: PAIN MANAGEMENT | Age: 57
End: 2022-01-31
Payer: COMMERCIAL

## 2022-01-31 VITALS
BODY MASS INDEX: 28.2 KG/M2 | WEIGHT: 197 LBS | DIASTOLIC BLOOD PRESSURE: 81 MMHG | SYSTOLIC BLOOD PRESSURE: 158 MMHG | OXYGEN SATURATION: 98 % | HEART RATE: 72 BPM | TEMPERATURE: 97.5 F | HEIGHT: 70 IN

## 2022-01-31 DIAGNOSIS — S33.5XXA SPRAIN OF LOW BACK, INITIAL ENCOUNTER: ICD-10-CM

## 2022-01-31 DIAGNOSIS — S33.5XXA LOW BACK SPRAIN, INITIAL ENCOUNTER: ICD-10-CM

## 2022-01-31 DIAGNOSIS — S33.5XXA LUMBAR SPRAIN, INITIAL ENCOUNTER: ICD-10-CM

## 2022-01-31 DIAGNOSIS — M51.27 PROLAPSED LUMBOSACRAL INTERVERTEBRAL DISC: ICD-10-CM

## 2022-01-31 DIAGNOSIS — S33.5XXA SPRAIN OF LUMBAR REGION, INITIAL ENCOUNTER: ICD-10-CM

## 2022-01-31 DIAGNOSIS — G89.4 CHRONIC PAIN SYNDROME: ICD-10-CM

## 2022-01-31 PROCEDURE — 99213 OFFICE O/P EST LOW 20 MIN: CPT | Performed by: INTERNAL MEDICINE

## 2022-01-31 RX ORDER — DULOXETIN HYDROCHLORIDE 30 MG/1
30 CAPSULE, DELAYED RELEASE ORAL DAILY
Qty: 30 CAPSULE | Refills: 1 | Status: SHIPPED | OUTPATIENT
Start: 2022-01-31 | End: 2022-04-18 | Stop reason: SDUPTHER

## 2022-01-31 RX ORDER — HYDROCODONE BITARTRATE AND ACETAMINOPHEN 7.5; 325 MG/1; MG/1
1 TABLET ORAL EVERY 6 HOURS PRN
Qty: 84 TABLET | Refills: 0 | Status: SHIPPED | OUTPATIENT
Start: 2022-01-31 | End: 2022-02-28 | Stop reason: SDUPTHER

## 2022-01-31 RX ORDER — MORPHINE SULFATE 30 MG/1
30 CAPSULE, EXTENDED RELEASE ORAL DAILY
Qty: 28 CAPSULE | Refills: 0 | Status: SHIPPED | OUTPATIENT
Start: 2022-01-31 | End: 2022-02-28 | Stop reason: SDUPTHER

## 2022-01-31 NOTE — PROGRESS NOTES
Girishlukasz Alexander  1965  [id number]    HISTORY OF PRESENT ILLNESS:  Mr. Juancho Cordova is a 60 y.o. male returns for a follow up visit for multiple medical problems. His current presenting problems are   1. BWC Sprain of lumbar region, initial encounter    2. BWC Sprain of low back, initial encounter    3. BWC Low back sprain, initial encounter    4. BWC Lumbar sprain, initial encounter    5. bwc-Prolapsed lumbosacral intervertebral disc    . As per information/history obtained from the PADT(patient assessment and documentation tool) - He complains of pain in the lower back and buttocks with radiation to the hips Left, upper leg Left and knees Left He rates the pain 8/10 and describes it as aching, burning, pins and needles. Pain is made worse by: movement, walking, standing, bending, lifting. Current treatment regimen has helped relieve about 60% of the pain. He denies side effects from the current pain regimen. Patient reports that since the last follow up visit the physical functioning is worse, family/social relationships are unchanged, mood is unchanged and sleep patterns are unchanged, and that the overall functioning is worse. Patient denies neurological bowel or bladder. Patient denies misusing/abusing his narcotic pain medications or using any illegal drugs. There are No indicators for possible drug abuse, addiction or diversion problems. Upon obtaining the medical history from Mr. Juancho Cordova regarding today's office visit for his presenting problems, patient states he has not been feeling well, he has been sick for the last few days. Mr. Juancho Cordova mentions he is using Jackie along with Norco 3 per day. He states he tried the [place] which did not work, he is back on Seroquel. Patient reports he has been working part time still. Patient denies any side effects with the medications. ALLERGIES: Patients list of allergies were reviewed     MEDICATIONS: Mr. Juancho Cordova list of medications were reviewed. His current medications are   Outpatient Medications Prior to Visit   Medication Sig Dispense Refill    QUEtiapine (SEROQUEL) 25 MG tablet Take 1-2 tablets by mouth nightly 60 tablet 1    meloxicam (MOBIC) 15 MG tablet Take 1 tablet by mouth daily as needed for Pain 30 tablet 1    gabapentin (NEURONTIN) 400 MG capsule Take 1 capsule by mouth 3 times daily for 30 days. 90 capsule 0    magnesium oxide (MAG-OX) 400 MG tablet Take 1 tablet by mouth 2 times daily 60 tablet 1    NOVOLOG 100 UNIT/ML injection continuous. Insulin pump averages 50-80 units daily      aspirin 325 MG tablet Take 325 mg by mouth daily.  carvedilol (COREG) 6.25 MG tablet Take 6.25 mg by mouth 2 times daily (with meals).  atorvastatin (LIPITOR) 80 MG tablet Take 80 mg by mouth nightly.  clopidogrel (PLAVIX) 75 MG tablet Take 75 mg by mouth daily.  lisinopril (PRINIVIL;ZESTRIL) 10 MG tablet Take 10 mg by mouth daily.  morphine (LAURA) 30 MG extended release capsule Take 1 capsule by mouth daily for 28 days. 28 capsule 0    HYDROcodone-acetaminophen (NORCO) 7.5-325 MG per tablet Take 1 tablet by mouth every 6 hours as needed for Pain (max 3 per day) for up to 28 days. 84 tablet 0    DULoxetine (CYMBALTA) 30 MG extended release capsule Take 1 capsule by mouth daily 30 capsule 1    eszopiclone (LUNESTA) 2 MG TABS Take 1 tablet by mouth nightly. 30 tablet 0     No facility-administered medications prior to visit. REVIEW OF SYSTEMS:    Respiratory: Negative for apnea, chest tightness and shortness of breath or change in baseline breathing. PHYSICAL EXAM:   Nursing note and vitals reviewed. BP (!) 158/81   Pulse 72   Temp 97.5 °F (36.4 °C)   Ht 5' 10\" (1.778 m)   Wt 197 lb (89.4 kg)   SpO2 98%   BMI 28.27 kg/m²   Constitutional: He appears well-developed and well-nourished. No acute distress. Cardiovascular: Normal rate, regular rhythm, normal heart sounds, and does not have murmur.      Pulmonary/Chest: Effort normal. No respiratory distress. He does not have wheezes in the lung fields. He has no rales. Neurological/Psychiatric:He is alert and oriented to person, place, and time. Coordination is  normal.  His mood isAppropriate and affect is Neutral/Euthymic(normal) . IMPRESSION:   1. BWC Sprain of lumbar region, initial encounter    2. BWC Sprain of low back, initial encounter    3. BWC Low back sprain, initial encounter    4. BWC Lumbar sprain, initial encounter    5. bwc-Prolapsed lumbosacral intervertebral disc    6. Chronic pain syndrome    7. Lumbar sprain, initial encounter    8. Sprain of low back, initial encounter        PLAN:  Informed verbal consent was obtained  -[place] was obtained and reviewed  for the last one year and no indicators of drug misuse  were found. Any other controlled substance prescriptions  seen on the record have been accounted for, I am aware of the patient receiving these medications. Brett Christensen [place] record will be rechecked as part of office protocol. -ROM/Stretching exercises as advised   -Continue with Laura Norco 3 per day  -Walking 20 minutes daily as tolerated   -Patient's urine drug screen results with GC/MS confirmation were obtained and reviewed and were negative for any illicit drugs. Prescribed medications were within acceptable range. Current Outpatient Medications   Medication Sig Dispense Refill    morphine (LAURA) 30 MG extended release capsule Take 1 capsule by mouth daily for 28 days. 28 capsule 0    HYDROcodone-acetaminophen (NORCO) 7.5-325 MG per tablet Take 1 tablet by mouth every 6 hours as needed for Pain (max 3 per day) for up to 28 days.  84 tablet 0    DULoxetine (CYMBALTA) 30 MG extended release capsule Take 1 capsule by mouth daily 30 capsule 1    QUEtiapine (SEROQUEL) 25 MG tablet Take 1-2 tablets by mouth nightly 60 tablet 1    meloxicam (MOBIC) 15 MG tablet Take 1 tablet by mouth daily as needed for Pain 30 tablet 1    gabapentin (NEURONTIN) 400 MG capsule Take 1 capsule by mouth 3 times daily for 30 days. 90 capsule 0    magnesium oxide (MAG-OX) 400 MG tablet Take 1 tablet by mouth 2 times daily 60 tablet 1    NOVOLOG 100 UNIT/ML injection continuous. Insulin pump averages 50-80 units daily      aspirin 325 MG tablet Take 325 mg by mouth daily.  carvedilol (COREG) 6.25 MG tablet Take 6.25 mg by mouth 2 times daily (with meals).  atorvastatin (LIPITOR) 80 MG tablet Take 80 mg by mouth nightly.  clopidogrel (PLAVIX) 75 MG tablet Take 75 mg by mouth daily.  lisinopril (PRINIVIL;ZESTRIL) 10 MG tablet Take 10 mg by mouth daily. No current facility-administered medications for this visit. I will continue his current medication regimen  which is part of the above treatment schedule. It has been helping with Mr. Jodie Brownlee chronic  medical problems which for this visit include:   Diagnoses of BWC Sprain of lumbar region, initial encounter, BWC Sprain of low back, initial encounter, BWC Low back sprain, initial encounter, BWC Lumbar sprain, initial encounter, bwc-Prolapsed lumbosacral intervertebral disc, Chronic pain syndrome, Lumbar sprain, initial encounter, and Sprain of low back, initial encounter were pertinent to this visit. Risks and benefits of the medications and other alternative treatments  including no treatment were discussed with the patient. The common side effects of these medications were also explained to the patient. Informed verbal consent was obtained. Goals of current treatment regimen include improvement in pain, restoration of functioning- with focus on improvement in physical performance, general activity, work or disability,emotional distress, health care utilization and  decreased medication consumption. Will continue to monitor progress towards achieving/maintaining therapeutic goals with special emphasis on  1. Improvement in perceived interfernce  of pain with ADL's.  Ability to do home exercises independently. Ability to do household chores indoor and/or outdoor work and social and leisure activities. Improve psychosocial and physical functioning. - he is showing progression towards this treatment goal with the current regimen. He was advised against drinking alcohol with the narcotic pain medicines, advised against driving or handling machinery while adjusting the dose of medicines or if having cognitive  issues related to the current medications. Risk of overdose and death, if medicines not taken as prescribed, were also discussed. If the patient develops new symptoms or if the symptoms worsen, the patient should call the office. While transcribing every attempt was made to maintain the accuracy of the note in terms of it's contents,there may have been some errors made inadvertently. Thank you for allowing me to participate in the care of this patient.     Ashley Baker MD.    Cc: Taran Cooney MD

## 2022-02-01 ENCOUNTER — PATIENT MESSAGE (OUTPATIENT)
Dept: PAIN MANAGEMENT | Age: 57
End: 2022-02-01

## 2022-02-01 DIAGNOSIS — G89.4 CHRONIC PAIN SYNDROME: ICD-10-CM

## 2022-02-01 DIAGNOSIS — S33.5XXA LUMBAR SPRAIN, INITIAL ENCOUNTER: ICD-10-CM

## 2022-02-01 DIAGNOSIS — S33.5XXA SPRAIN OF LUMBAR REGION, INITIAL ENCOUNTER: ICD-10-CM

## 2022-02-01 DIAGNOSIS — S33.5XXA LOW BACK SPRAIN, INITIAL ENCOUNTER: ICD-10-CM

## 2022-02-01 DIAGNOSIS — M51.27 PROLAPSED LUMBOSACRAL INTERVERTEBRAL DISC: ICD-10-CM

## 2022-02-01 DIAGNOSIS — S33.5XXA SPRAIN OF LOW BACK, INITIAL ENCOUNTER: ICD-10-CM

## 2022-02-18 RX ORDER — QUETIAPINE FUMARATE 25 MG/1
25-50 TABLET, FILM COATED ORAL NIGHTLY
Qty: 60 TABLET | Refills: 0 | Status: SHIPPED | OUTPATIENT
Start: 2022-02-18 | End: 2022-02-28 | Stop reason: SDUPTHER

## 2022-02-28 ENCOUNTER — OFFICE VISIT (OUTPATIENT)
Dept: PAIN MANAGEMENT | Age: 57
End: 2022-02-28
Payer: COMMERCIAL

## 2022-02-28 VITALS
SYSTOLIC BLOOD PRESSURE: 152 MMHG | WEIGHT: 191 LBS | DIASTOLIC BLOOD PRESSURE: 82 MMHG | HEART RATE: 79 BPM | BODY MASS INDEX: 27.41 KG/M2 | OXYGEN SATURATION: 97 %

## 2022-02-28 DIAGNOSIS — S33.5XXA LUMBAR SPRAIN, INITIAL ENCOUNTER: ICD-10-CM

## 2022-02-28 DIAGNOSIS — G89.4 CHRONIC PAIN SYNDROME: ICD-10-CM

## 2022-02-28 DIAGNOSIS — S33.5XXA LOW BACK SPRAIN, INITIAL ENCOUNTER: ICD-10-CM

## 2022-02-28 DIAGNOSIS — S33.5XXA SPRAIN OF LOW BACK, INITIAL ENCOUNTER: ICD-10-CM

## 2022-02-28 DIAGNOSIS — S33.5XXA SPRAIN OF LUMBAR REGION, INITIAL ENCOUNTER: ICD-10-CM

## 2022-02-28 DIAGNOSIS — M51.27 PROLAPSED LUMBOSACRAL INTERVERTEBRAL DISC: ICD-10-CM

## 2022-02-28 PROCEDURE — 99214 OFFICE O/P EST MOD 30 MIN: CPT | Performed by: INTERNAL MEDICINE

## 2022-02-28 RX ORDER — QUETIAPINE FUMARATE 25 MG/1
25-50 TABLET, FILM COATED ORAL NIGHTLY
Qty: 60 TABLET | Refills: 1 | Status: SHIPPED | OUTPATIENT
Start: 2022-02-28 | End: 2022-03-28 | Stop reason: SDUPTHER

## 2022-02-28 RX ORDER — MORPHINE SULFATE 30 MG/1
30 CAPSULE, EXTENDED RELEASE ORAL DAILY
Qty: 28 CAPSULE | Refills: 0 | Status: SHIPPED | OUTPATIENT
Start: 2022-02-28 | End: 2022-03-28 | Stop reason: SDUPTHER

## 2022-02-28 RX ORDER — HYDROCODONE BITARTRATE AND ACETAMINOPHEN 7.5; 325 MG/1; MG/1
1 TABLET ORAL EVERY 6 HOURS PRN
Qty: 84 TABLET | Refills: 0 | Status: SHIPPED | OUTPATIENT
Start: 2022-02-28 | End: 2022-03-28 | Stop reason: SDUPTHER

## 2022-02-28 NOTE — PROGRESS NOTES
Azar Big  1965  [id number]    HISTORY OF PRESENT ILLNESS:  Mr. Sherryle Drew is a 60 y.o. male returns for a follow up visit for multiple medical problems. His current presenting problems are   1. BWC Lumbar sprain, initial encounter    2. BWC Sprain of lumbar region, initial encounter    3. BWC Low back sprain, initial encounter    4. bwc-Prolapsed lumbosacral intervertebral disc    5. BWC Sprain of low back, initial encounter    6. Chronic pain syndrome    . As per information/history obtained from the PADT(patient assessment and documentation tool) - He complains of pain in the lower back and knees Left. He rates the pain 7/10 and describes it as aching, burning, stabbing. Pain is made worse by: movement, walking, standing, bending, lifting. Current treatment regimen has helped relieve about 60% of the pain. He denies side effects from the current pain regimen. Patient reports that since the last follow up visit the physical functioning is worse, family/social relationships are unchanged, mood is worse and sleep patterns are worse, and that the overall functioning is unchanged. Patient denies neurological bowel or bladder. Patient denies misusing/abusing his narcotic pain medications or using any illegal drugs. There are No indicators for possible drug abuse, addiction or diversion problems. Upon obtaining the medical history from Mr. Sherryle Drew regarding today's office visit for his presenting problems, patient states he had a booster Covid injection done, he is having increase pain since and has been working part time since also. Patient denies any constipation symptoms. Patient states he is using Seroquel which is helping with sleep, Patient states his sleep is fair. Has fairly normal sleep latency. Averages about 4-6 hours of sleep a night. Denies any signs of sleep apnea. Feels somewhat rested in the morning. Patient's  subjective report of his mood is fair.  he describes occasional symptoms of depression, occasional  irritability and some mood swings. Describes his mood as being neutral and reports some pleasure in his daily activities. Reports  fair  appetite, energy and concentration. Able to function well in different aspects of his daily activities. Denies suicidal or homicidal ideation. Denies any complaints of increased tension, does   Worry sometimes and occasional  irritability  he denies any c/o increased anxiety, No c/o panic attacks or symptoms of PTSD, she is on Cymbalta 60 mg. Patient reports his blood sugar has been okay. ALLERGIES/PAST MED/FAM/SOC HISTORY: Mr. Juliette Patel allergies, past medical, family and social history were reviewed in the chart. Mr. Juliette Patel current medications are   Outpatient Medications Prior to Visit   Medication Sig Dispense Refill    DULoxetine (CYMBALTA) 30 MG extended release capsule Take 1 capsule by mouth daily 30 capsule 1    meloxicam (MOBIC) 15 MG tablet Take 1 tablet by mouth daily as needed for Pain 30 tablet 1    gabapentin (NEURONTIN) 400 MG capsule Take 1 capsule by mouth 3 times daily for 30 days. 90 capsule 0    magnesium oxide (MAG-OX) 400 MG tablet Take 1 tablet by mouth 2 times daily 60 tablet 1    NOVOLOG 100 UNIT/ML injection continuous. Insulin pump averages 50-80 units daily      aspirin 325 MG tablet Take 325 mg by mouth daily.  carvedilol (COREG) 6.25 MG tablet Take 6.25 mg by mouth 2 times daily (with meals).  atorvastatin (LIPITOR) 80 MG tablet Take 80 mg by mouth nightly.  clopidogrel (PLAVIX) 75 MG tablet Take 75 mg by mouth daily.  lisinopril (PRINIVIL;ZESTRIL) 10 MG tablet Take 10 mg by mouth daily.  QUEtiapine (SEROQUEL) 25 MG tablet Take 1-2 tablets by mouth nightly 60 tablet 0    morphine (LAURA) 30 MG extended release capsule Take 1 capsule by mouth daily for 28 days.  28 capsule 0    HYDROcodone-acetaminophen (NORCO) 7.5-325 MG per tablet Take 1 tablet by mouth every 6 hours as needed for Pain (max 3 per day) for up to 28 days. 84 tablet 0     No facility-administered medications prior to visit. REVIEW OF SYSTEMS:     Respiratory: Negative for shortness of breath. Cardiovascular: Negative for chest pain, palpitations  Gastrointestinal: Negative for blood in stool, abdominal distention, nausea, vomiting, abdominal pain, diarrhea,constipation. Neurological: Negative for speech difficulty, weakness and light-headedness, dizziness, tremors, sleepiness  Psychiatric/Behavioral: Negative for suicidal ideas, hallucinations, behavioral problems, self-injury, decreased concentration/cognition, agitation, confusion. PHYSICAL EXAM:   Nursing note and vitals reviewed. BP (!) 152/82   Pulse 79   Wt 191 lb (86.6 kg)   SpO2 97%   BMI 27.41 kg/m²   General Appearance: Patient is well nourished, well developed, well groomed and in no acute distress. Skin: Skin is warm and dry, good turgor . No rash or lesions noted. He is not diaphoretic. Pulmonary/Chest: Effort normal. No respiratory distress or use of accessory muscles. Auscultation revealing normal air entry. He does not have wheezes in the lung fields. He has no rales. Cardiovascular: Normal rate, regular rhythm, normal heart sounds, and does not have murmur. Exam reveals no gallop and no friction rub. Musculoskeletal / Extremities: Range of motion is normal. Gait is normal, assistive devices use: none. He exhibits edema: none, and no tenderness. Neurological/Psychiatric:He is alert and oriented to person, place, and time. Coordination is  normal.   Judgement and Insight is normal  His mood is Appropriate and affect is Neutral/Euthymic(normal) . His behavior is normal.   thought content normal.        IMPRESSION:     1. BWC Lumbar sprain, initial encounter    2. BWC Sprain of lumbar region, initial encounter    3. BWC Low back sprain, initial encounter    4. bwc-Prolapsed lumbosacral intervertebral disc    5.  BWC Sprain of low back, initial encounter    6. Chronic pain syndrome    7. Lumbar sprain, initial encounter    8. Sprain of low back, initial encounter        PLAN:  Informed verbal consent was obtained. -ROM/Stretching exercises as advised   -He was advised to increase fluids ( 5-7  glasses of fluid daily), limit caffeine, avoid cheese products, increase dietary fiber, increase activity and exercise as tolerated and relax regularly and enjoy meals   -Continue with Laura along with Norco 1-2 per day  -Walking as tolerated    -he was advised proper sleep hygiene-told to avoid:use of caffeine or other stimulants after noon, alcohol use near bedtime, long or frequent naps during the day, erratic sleep schedule, heavy meals near bedtime, vigorous exercise near bedtime and use of electronic devices near bedtime   -Continue with Seroquel   -CBT techniques- relaxation therapies such as biofeedback, mindfulness based stress reduction, imagery, cognitive restructuring, problem solving discussed with patient   -Continue with Cymbalta 60 mg   -Continue with Neurontin same dose     Mr. Jamie Cleaning will be prescribed  the medications  listed below which are for treatment of his presenting  medical problems which for this visit include:   Diagnoses of BWC Lumbar sprain, initial encounter, BWC Sprain of lumbar region, initial encounter, BWC Low back sprain, initial encounter, bwc-Prolapsed lumbosacral intervertebral disc, BWC Sprain of low back, initial encounter, Chronic pain syndrome, Lumbar sprain, initial encounter, and Sprain of low back, initial encounter were pertinent to this visit. Medications/orders associated with this visit:    Current Outpatient Medications   Medication Sig Dispense Refill    morphine (LAURA) 30 MG extended release capsule Take 1 capsule by mouth daily for 28 days.  28 capsule 0    HYDROcodone-acetaminophen (NORCO) 7.5-325 MG per tablet Take 1 tablet by mouth every 6 hours as needed for Pain (max 3 per day) for up to 28 days. 84 tablet 0    QUEtiapine (SEROQUEL) 25 MG tablet Take 1-2 tablets by mouth nightly 60 tablet 1    DULoxetine (CYMBALTA) 30 MG extended release capsule Take 1 capsule by mouth daily 30 capsule 1    meloxicam (MOBIC) 15 MG tablet Take 1 tablet by mouth daily as needed for Pain 30 tablet 1    gabapentin (NEURONTIN) 400 MG capsule Take 1 capsule by mouth 3 times daily for 30 days. 90 capsule 0    magnesium oxide (MAG-OX) 400 MG tablet Take 1 tablet by mouth 2 times daily 60 tablet 1    NOVOLOG 100 UNIT/ML injection continuous. Insulin pump averages 50-80 units daily      aspirin 325 MG tablet Take 325 mg by mouth daily.  carvedilol (COREG) 6.25 MG tablet Take 6.25 mg by mouth 2 times daily (with meals).  atorvastatin (LIPITOR) 80 MG tablet Take 80 mg by mouth nightly.  clopidogrel (PLAVIX) 75 MG tablet Take 75 mg by mouth daily.  lisinopril (PRINIVIL;ZESTRIL) 10 MG tablet Take 10 mg by mouth daily. No current facility-administered medications for this visit. Goals of current treatment regimen include improvement in pain, restoration of functioning- with focus on improvement in physical performance, general activity, work or disability,emotional distress, health care utilization and  decreased medication consumption. Will continue to monitor progress towards achieving/maintaining therapeutic goals with special emphasis on  1. Improvement in perceived interfernce  of pain with ADL's. Ability to do home exercises independently. Ability to do household chores indoor and/or outdoor work and social and leisure activities. To increase flexibility/ROM, strength and endurance. Improve psychosocial and physical functioning.- he is not showing any significant progress/or showing regression  towards this goal and reassessment and adjustment of goals/treatment have been made. 2. Improving sleep to 6-7 hours a night.  Improve mood/ anxiety and depression symptoms such as crying spells, low energy, problems with concentration, motivation.- he is showing progression towards this treatment goal with the current regimen. 3. Reduction of reliance on opioid analgesia/more appropriate opioid use. - he is showing progression towards this treatment goal with the current regimen. 4. Ability to focus/concentrate at work and perform the duties required of him at work  Sit through a workday without lower extremity symptoms. Stand 30-60 minutes without lower extremity symptoms. Ability to lift up to 10-20 lbs. Ability to go up and down stairs. Sit 30-60 minutes  Without having to stand up frequently. - he is maintaining/progressing towards his work related goals with the current regimen. Risks and benefits of the medications and other alternative treatments have been/were  discussed with the patient. Any questions on the  common side effects of these medications were also answered. He was advised against drinking alcohol with the narcotic pain medicines, advised against driving or handling machinery when  starting or adjusting the dose of medicines, feeling groggy or drowsy, or if having any cognitive issues related to the current medications. Heis fully aware of the risk of overdose and death, if medicines are misused and not taken as prescribed. If he develops new symptoms or if the symptoms worsen, he was told to call the office. .  Thank you for allowing me to participate in the care of this patient.     Viki Tucker MD.    Cc: Melissa Zarco MD

## 2022-03-28 ENCOUNTER — OFFICE VISIT (OUTPATIENT)
Dept: PAIN MANAGEMENT | Age: 57
End: 2022-03-28
Payer: COMMERCIAL

## 2022-03-28 VITALS
WEIGHT: 197.2 LBS | BODY MASS INDEX: 28.3 KG/M2 | SYSTOLIC BLOOD PRESSURE: 155 MMHG | HEART RATE: 63 BPM | DIASTOLIC BLOOD PRESSURE: 84 MMHG | OXYGEN SATURATION: 98 %

## 2022-03-28 DIAGNOSIS — S33.5XXA SPRAIN OF LOW BACK, INITIAL ENCOUNTER: ICD-10-CM

## 2022-03-28 DIAGNOSIS — G89.4 CHRONIC PAIN SYNDROME: ICD-10-CM

## 2022-03-28 DIAGNOSIS — S33.5XXA LOW BACK SPRAIN, INITIAL ENCOUNTER: ICD-10-CM

## 2022-03-28 DIAGNOSIS — S33.5XXA LUMBAR SPRAIN, INITIAL ENCOUNTER: ICD-10-CM

## 2022-03-28 DIAGNOSIS — M51.27 PROLAPSED LUMBOSACRAL INTERVERTEBRAL DISC: ICD-10-CM

## 2022-03-28 DIAGNOSIS — S33.5XXA SPRAIN OF LUMBAR REGION, INITIAL ENCOUNTER: ICD-10-CM

## 2022-03-28 PROCEDURE — 99213 OFFICE O/P EST LOW 20 MIN: CPT | Performed by: INTERNAL MEDICINE

## 2022-03-28 RX ORDER — QUETIAPINE FUMARATE 25 MG/1
25-50 TABLET, FILM COATED ORAL NIGHTLY
Qty: 60 TABLET | Refills: 1 | Status: SHIPPED | OUTPATIENT
Start: 2022-03-28 | End: 2022-04-18 | Stop reason: SDUPTHER

## 2022-03-28 RX ORDER — MORPHINE SULFATE 30 MG/1
30 CAPSULE, EXTENDED RELEASE ORAL DAILY
Qty: 28 CAPSULE | Refills: 0 | Status: SHIPPED | OUTPATIENT
Start: 2022-03-28 | End: 2022-04-18 | Stop reason: SDUPTHER

## 2022-03-28 RX ORDER — HYDROCODONE BITARTRATE AND ACETAMINOPHEN 7.5; 325 MG/1; MG/1
1 TABLET ORAL EVERY 6 HOURS PRN
Qty: 84 TABLET | Refills: 0 | Status: SHIPPED | OUTPATIENT
Start: 2022-03-28 | End: 2022-04-18 | Stop reason: SDUPTHER

## 2022-03-28 NOTE — PROGRESS NOTES
Ethan Jurado  1965  [id number]      HISTORY OF PRESENT ILLNESS:  Mr. Joanna Odonnell is a 60 y.o. male returns for a follow up visit for pain management  He has a diagnosis of   1. BWC Lumbar sprain, initial encounter    2. BWC Low back sprain, initial encounter    3. BWC Sprain of low back, initial encounter    4. BWC Sprain of lumbar region, initial encounter    5. bwc-Prolapsed lumbosacral intervertebral disc    6. Chronic pain syndrome    7. Lumbar sprain, initial encounter    8. Sprain of low back, initial encounter    . He complains of pain in the lower back with radiation to the left knee  He rates the pain 7/10 and describes it as sharp, aching, burning, pins and needles. Current treatment regimen has helped relieve about 60% of the pain. He denies any side effects from the current pain regimen. Patient reports that since the last follow up visit the physical functioning is worse, family/social relationships are unchanged, mood is worse sleep patterns are worse, and that the overall functioning is worse. Patient denies misusing/abusing his narcotic pain medications or using any illegal drugs. There are No indicators for possible drug abuse, addiction or diversion problems, patient states he has been sick for a few days, he is not feeling well today. Mr. Joanna Odonnell mentions he is using all the adjuvants. Patient denies any constipation symptoms. Patient reports he is working full time still. ALLERGIES: Patients list of allergies were reviewed     MEDICATIONS: Mr. Joanna Odonnell list of medications were reviewed. His current medications are   Outpatient Medications Prior to Visit   Medication Sig Dispense Refill    DULoxetine (CYMBALTA) 30 MG extended release capsule Take 1 capsule by mouth daily 30 capsule 1    meloxicam (MOBIC) 15 MG tablet Take 1 tablet by mouth daily as needed for Pain 30 tablet 1    magnesium oxide (MAG-OX) 400 MG tablet Take 1 tablet by mouth 2 times daily 60 tablet 1    NOVOLOG 100 UNIT/ML injection continuous. Insulin pump averages 50-80 units daily      aspirin 325 MG tablet Take 325 mg by mouth daily.  carvedilol (COREG) 6.25 MG tablet Take 6.25 mg by mouth 2 times daily (with meals).  atorvastatin (LIPITOR) 80 MG tablet Take 80 mg by mouth nightly.  clopidogrel (PLAVIX) 75 MG tablet Take 75 mg by mouth daily.  lisinopril (PRINIVIL;ZESTRIL) 10 MG tablet Take 10 mg by mouth daily.  morphine (LAURA) 30 MG extended release capsule Take 1 capsule by mouth daily for 28 days. 28 capsule 0    HYDROcodone-acetaminophen (NORCO) 7.5-325 MG per tablet Take 1 tablet by mouth every 6 hours as needed for Pain (max 3 per day) for up to 28 days. 84 tablet 0    QUEtiapine (SEROQUEL) 25 MG tablet Take 1-2 tablets by mouth nightly 60 tablet 1    gabapentin (NEURONTIN) 400 MG capsule Take 1 capsule by mouth 3 times daily for 30 days. 90 capsule 0     No facility-administered medications prior to visit. REVIEW OF SYSTEMS:    Respiratory: Negative for apnea, chest tightness and shortness of breath or change in baseline breathing. PHYSICAL EXAM:   Nursing note and vitals reviewed. BP (!) 155/84   Pulse 63   Wt 197 lb 3.2 oz (89.4 kg)   SpO2 98%   BMI 28.30 kg/m²   Constitutional: He appears well-developed and well-nourished. No acute distress. Cardiovascular: Normal rate, regular rhythm, normal heart sounds, and does not have murmur. Pulmonary/Chest: Effort normal. No respiratory distress. He does not have wheezes in the lung fields. He has no rales. Neurological/Psychiatric:He is alert and oriented to person, place, and time. Coordination is  normal.  His mood isAppropriate and affect is Neutral/Euthymic(normal) . His    IMPRESSION:   1. BWC Lumbar sprain, initial encounter    2. BWC Low back sprain, initial encounter    3. BWC Sprain of low back, initial encounter    4.  BWC Sprain of lumbar region, initial encounter    5. bwc-Prolapsed lumbosacral intervertebral disc    6. Chronic pain syndrome    7. Lumbar sprain, initial encounter    8. Sprain of low back, initial encounter        PLAN:  Informed verbal consent was obtained  -ROM/Stretching exercises as advised   -He was advised to increase fluids ( 5-7  glasses of fluid daily), limit caffeine, avoid cheese products, increase dietary fiber, increase activity and exercise as tolerated and relax regularly and enjoy meals   -Walking as tolerated    -Continue with Laura along with Norco 3 per day for btp  -Continue with all other adjuvant medications as before      Current Outpatient Medications   Medication Sig Dispense Refill    morphine (LAURA) 30 MG extended release capsule Take 1 capsule by mouth daily for 28 days. 28 capsule 0    HYDROcodone-acetaminophen (NORCO) 7.5-325 MG per tablet Take 1 tablet by mouth every 6 hours as needed for Pain (max 3 per day) for up to 28 days. 84 tablet 0    QUEtiapine (SEROQUEL) 25 MG tablet Take 1-2 tablets by mouth nightly 60 tablet 1    DULoxetine (CYMBALTA) 30 MG extended release capsule Take 1 capsule by mouth daily 30 capsule 1    meloxicam (MOBIC) 15 MG tablet Take 1 tablet by mouth daily as needed for Pain 30 tablet 1    magnesium oxide (MAG-OX) 400 MG tablet Take 1 tablet by mouth 2 times daily 60 tablet 1    NOVOLOG 100 UNIT/ML injection continuous. Insulin pump averages 50-80 units daily      aspirin 325 MG tablet Take 325 mg by mouth daily.  carvedilol (COREG) 6.25 MG tablet Take 6.25 mg by mouth 2 times daily (with meals).  atorvastatin (LIPITOR) 80 MG tablet Take 80 mg by mouth nightly.  clopidogrel (PLAVIX) 75 MG tablet Take 75 mg by mouth daily.  lisinopril (PRINIVIL;ZESTRIL) 10 MG tablet Take 10 mg by mouth daily.  gabapentin (NEURONTIN) 400 MG capsule Take 1 capsule by mouth 3 times daily for 30 days. 90 capsule 0     No current facility-administered medications for this visit.      I will continue his current medication regimen  which is part of the above treatment schedule. It has been helping with Mr. Vj Key chronic  medical problems which for this visit include:   Diagnoses of BWC Lumbar sprain, initial encounter, BWC Low back sprain, initial encounter, BWC Sprain of low back, initial encounter, BWC Sprain of lumbar region, initial encounter, bwc-Prolapsed lumbosacral intervertebral disc, Chronic pain syndrome, Lumbar sprain, initial encounter, and Sprain of low back, initial encounter were pertinent to this visit. Risks and benefits of the medications and other alternative treatments  including no treatment were discussed with the patient. The common side effects of these medications were also explained to the patient. Informed verbal consent was obtained. Goals of current treatment regimen include improvement in pain, restoration of functioning- with focus on improvement in physical performance, general activity, work or disability,emotional distress, health care utilization and  decreased medication consumption. Will continue to monitor progress towards achieving/maintaining therapeutic goals with special emphasis on  1. Improvement in perceived interfernce  of pain with ADL's. Ability to do home exercises independently. Ability to do household chores indoor and/or outdoor work and social and leisure activities. Improve psychosocial and physical functioning. - he is showing progression towards this treatment goal with the current regimen. He was advised against drinking alcohol with the narcotic pain medicines, advised against driving or handling machinery while adjusting the dose of medicines or if having cognitive  issues related to the current medications. Risk of overdose and death, if medicines not taken as prescribed, were also discussed. If the patient develops new symptoms or if the symptoms worsen, the patient should call the office.     While transcribing every attempt was made to maintain the accuracy of the note in terms of it's contents,there may have been some errors made inadvertently. Thank you for allowing me to participate in the care of this patient.     Hussain Buckley MD.    Cc: Diego Belcher MD

## 2022-04-18 ENCOUNTER — OFFICE VISIT (OUTPATIENT)
Dept: PAIN MANAGEMENT | Age: 57
End: 2022-04-18
Payer: COMMERCIAL

## 2022-04-18 VITALS
HEIGHT: 70 IN | HEART RATE: 74 BPM | BODY MASS INDEX: 28.2 KG/M2 | WEIGHT: 197 LBS | OXYGEN SATURATION: 97 % | DIASTOLIC BLOOD PRESSURE: 88 MMHG | SYSTOLIC BLOOD PRESSURE: 156 MMHG

## 2022-04-18 DIAGNOSIS — G89.4 CHRONIC PAIN SYNDROME: ICD-10-CM

## 2022-04-18 DIAGNOSIS — S33.5XXA SPRAIN OF LUMBAR REGION, INITIAL ENCOUNTER: ICD-10-CM

## 2022-04-18 DIAGNOSIS — S33.5XXA LOW BACK SPRAIN, INITIAL ENCOUNTER: ICD-10-CM

## 2022-04-18 DIAGNOSIS — S33.5XXA SPRAIN OF LOW BACK, INITIAL ENCOUNTER: ICD-10-CM

## 2022-04-18 DIAGNOSIS — S33.5XXA LUMBAR SPRAIN, INITIAL ENCOUNTER: ICD-10-CM

## 2022-04-18 DIAGNOSIS — M51.27 PROLAPSED LUMBOSACRAL INTERVERTEBRAL DISC: ICD-10-CM

## 2022-04-18 PROCEDURE — 99214 OFFICE O/P EST MOD 30 MIN: CPT | Performed by: INTERNAL MEDICINE

## 2022-04-18 RX ORDER — MORPHINE SULFATE 30 MG/1
30 CAPSULE, EXTENDED RELEASE ORAL DAILY
Qty: 35 CAPSULE | Refills: 0 | Status: SHIPPED | OUTPATIENT
Start: 2022-04-18 | End: 2022-05-23 | Stop reason: SDUPTHER

## 2022-04-18 RX ORDER — GABAPENTIN 400 MG/1
400 CAPSULE ORAL 3 TIMES DAILY
Qty: 90 CAPSULE | Refills: 1 | Status: SHIPPED | OUTPATIENT
Start: 2022-04-18 | End: 2022-06-20 | Stop reason: SDUPTHER

## 2022-04-18 RX ORDER — MAGNESIUM OXIDE 400 MG/1
400 TABLET ORAL 2 TIMES DAILY
Qty: 60 TABLET | Refills: 1 | Status: SHIPPED | OUTPATIENT
Start: 2022-04-18 | End: 2022-06-20 | Stop reason: SDUPTHER

## 2022-04-18 RX ORDER — DULOXETIN HYDROCHLORIDE 60 MG/1
60 CAPSULE, DELAYED RELEASE ORAL DAILY
Qty: 30 CAPSULE | Refills: 0 | Status: SHIPPED | OUTPATIENT
Start: 2022-04-18 | End: 2022-06-20 | Stop reason: SDUPTHER

## 2022-04-18 RX ORDER — MELOXICAM 15 MG/1
15 TABLET ORAL DAILY PRN
Qty: 30 TABLET | Refills: 1 | Status: SHIPPED | OUTPATIENT
Start: 2022-04-18 | End: 2022-06-20 | Stop reason: SDUPTHER

## 2022-04-18 RX ORDER — QUETIAPINE FUMARATE 25 MG/1
25-50 TABLET, FILM COATED ORAL NIGHTLY
Qty: 60 TABLET | Refills: 1 | Status: SHIPPED | OUTPATIENT
Start: 2022-04-18 | End: 2022-06-20 | Stop reason: SDUPTHER

## 2022-04-18 RX ORDER — DULOXETIN HYDROCHLORIDE 30 MG/1
30 CAPSULE, DELAYED RELEASE ORAL DAILY
Qty: 30 CAPSULE | Refills: 1 | Status: SHIPPED
Start: 2022-04-18 | End: 2022-04-18 | Stop reason: CLARIF

## 2022-04-18 RX ORDER — HYDROCODONE BITARTRATE AND ACETAMINOPHEN 7.5; 325 MG/1; MG/1
1 TABLET ORAL EVERY 6 HOURS PRN
Qty: 105 TABLET | Refills: 0 | Status: SHIPPED | OUTPATIENT
Start: 2022-04-18 | End: 2022-05-23 | Stop reason: SDUPTHER

## 2022-04-18 NOTE — PROGRESS NOTES
[facility]  1965  [id number]    HISTORY OF PRESENT ILLNESS:  Mr. Fco Braden is a 59 y.o. male returns for a follow up visit for multiple medical problems. His  presenting problems are   1. Chronic pain syndrome    . As per information/history obtained from the PADT(patient assessment and documentation tool) -  He complains of pain in the lower back with radiation to the buttocks, hips Left, upper leg Left and knees Left He rates the pain 6/10 and describes it as sharp, aching, pins and needles. Pain is made worse by: movement, walking, standing, bending, lifting. Current treatment regimen has helped relieve about 60% of the pain. He denies side effects from the current pain regimen. Patient reports that since the last follow up visit the physical functioning is worse, family/social relationships are unchanged, mood is worse and sleep patterns are worse, and that the overall functioning is worse. Patient denies neurological bowel or bladder. Patient denies misusing/abusing his narcotic pain medications or using any illegal drugs. There are No indicators for possible drug abuse, addiction or diversion problems. Upon obtaining the medical history from Mr. Fco Braden regarding today's office visit for his presenting problems, patient states he has been doing fair. Mr. Fco Braden says his back has been sore lately. Patient states his shoulders and elbows are hurting more. He mentions he is using Mobic also PRN. He complains of increased pain with changes in weather. Extreme temperatures- cold and damp weather causes increased pain. He mentions he is using Jackie and Norco 3 per day. Patient denies any constipation symptoms. Patient states his sleep is fair. Has fairly normal sleep latency. Averages about 4-6 hours of sleep a night. Denies any signs of sleep apnea. Feels somewhat rested in the morning, he is on Seroquel. Patient's  subjective report of his mood is fair.  he describes occasional symptoms of depression, occasional  irritability and some mood swings. Describes his mood as being neutral and reports some pleasure in his daily activities. Reports  fair  appetite, energy and concentration. Able to function well in different aspects of his daily activities. Denies suicidal or homicidal ideation. Denies any complaints of increased tension, does   Worry sometimes and occasional  irritability  he denies any c/o increased anxiety, No c/o panic attacks or symptoms of PTSD, he is on Cymbalta 30 mg. ALLERGIES/PAST MED/FAM/SOC HISTORY: Mr. Annabella Vega allergies, past medical, family and social history were reviewed in the chart. Mr. Annabella Vega current medications are   Outpatient Medications Prior to Visit   Medication Sig Dispense Refill    morphine (LAURA) 30 MG extended release capsule Take 1 capsule by mouth daily for 28 days. 28 capsule 0    HYDROcodone-acetaminophen (NORCO) 7.5-325 MG per tablet Take 1 tablet by mouth every 6 hours as needed for Pain (max 3 per day) for up to 28 days. 84 tablet 0    QUEtiapine (SEROQUEL) 25 MG tablet Take 1-2 tablets by mouth nightly 60 tablet 1    DULoxetine (CYMBALTA) 30 MG extended release capsule Take 1 capsule by mouth daily 30 capsule 1    meloxicam (MOBIC) 15 MG tablet Take 1 tablet by mouth daily as needed for Pain 30 tablet 1    magnesium oxide (MAG-OX) 400 MG tablet Take 1 tablet by mouth 2 times daily 60 tablet 1    NOVOLOG 100 UNIT/ML injection continuous. Insulin pump averages 50-80 units daily      aspirin 325 MG tablet Take 325 mg by mouth daily.  carvedilol (COREG) 6.25 MG tablet Take 6.25 mg by mouth 2 times daily (with meals).  atorvastatin (LIPITOR) 80 MG tablet Take 80 mg by mouth nightly.  clopidogrel (PLAVIX) 75 MG tablet Take 75 mg by mouth daily.  lisinopril (PRINIVIL;ZESTRIL) 10 MG tablet Take 10 mg by mouth daily.  gabapentin (NEURONTIN) 400 MG capsule Take 1 capsule by mouth 3 times daily for 30 days.  90 capsule 0     No facility-administered medications prior to visit. REVIEW OF SYSTEMS: .   Respiratory: Negative for shortness of breath. Cardiovascular: Negative for chest pain, palpitations  Gastrointestinal: Negative for blood in stool, abdominal distention, nausea, vomiting, abdominal pain, diarrhea,constipation. Neurological: Negative for speech difficulty, weakness and light-headedness, dizziness, tremors, sleepiness  Psychiatric/Behavioral: Negative for suicidal ideas, hallucinations, behavioral problems, self-injury, decreased concentration/cognition, agitation, confusion. PHYSICAL EXAM:   Nursing note and vitals reviewed. BP (!) 156/88 (Site: Left Upper Arm, Position: Sitting)   Pulse 74   Ht 5' 10\" (1.778 m)   Wt 197 lb (89.4 kg)   SpO2 97%   BMI 28.27 kg/m²   General Appearance: Patient is well nourished, well developed, well groomed and in no acute distress. Skin: Skin is warm and dry, good turgor . No rash or lesions noted. He is not diaphoretic. Pulmonary/Chest: Effort normal. No respiratory distress or use of accessory muscles. Auscultation revealing normal air entry. He does not have wheezes in the lung fields. He has no rales. Cardiovascular: Normal rate, regular rhythm, normal heart sounds, and does not have murmur. Exam reveals no gallop and no friction rub. Musculoskeletal / Extremities: Range of motion is normal. Gait is normal, assistive devices use: none. He exhibits edema: none, and no tenderness. Neurological/Psychiatric:He is alert and oriented to person, place, and time. Coordination is  normal.   Judgement and Insight is normal  His mood is Appropriate and affect is Neutral/Euthymic(normal) . His behavior is normal.   thought content normal.        IMPRESSION:     1. Chronic pain syndrome    2. C Lumbar sprain, initial encounter    3. C Low back sprain, initial encounter    4. C Sprain of low back, initial encounter    5.  C Sprain of lumbar region, initial encounter    6. bwc-Prolapsed lumbosacral intervertebral disc    7. Lumbar sprain, initial encounter    8. Sprain of low back, initial encounter        PLAN:  Informed verbal consent was obtained. -Warm water exercises as advised    -Increase Cymbalta to 60 mg   -He was advised to increase fluids ( 5-7  glasses of fluid daily), limit caffeine, avoid cheese products, increase dietary fiber, increase activity and exercise as tolerated and relax regularly and enjoy meals   -Continue with Norco 3 per day along with Jackie 30 mg   -he was advised proper sleep hygiene-told to avoid:use of caffeine or other stimulants after noon, alcohol use near bedtime, long or frequent naps during the day, erratic sleep schedule, heavy meals near bedtime, vigorous exercise near bedtime and use of electronic devices near bedtime   -Continue with Seroquel   -Continue with Mobic   -CBT techniques- relaxation therapies such as biofeedback, mindfulness based stress reduction, imagery, cognitive restructuring, problem solving discussed with patient   -Continue with Neurontin same dose     Mr. Jacqueline Ren will be prescribed  the medications  listed below which are for treatment of his presenting  medical problems which for this visit include: The encounter diagnosis was Chronic pain syndrome. Medications/orders associated with this visit:    Current Outpatient Medications   Medication Sig Dispense Refill    morphine (JACKIE) 30 MG extended release capsule Take 1 capsule by mouth daily for 28 days. 28 capsule 0    HYDROcodone-acetaminophen (NORCO) 7.5-325 MG per tablet Take 1 tablet by mouth every 6 hours as needed for Pain (max 3 per day) for up to 28 days.  84 tablet 0    QUEtiapine (SEROQUEL) 25 MG tablet Take 1-2 tablets by mouth nightly 60 tablet 1    DULoxetine (CYMBALTA) 30 MG extended release capsule Take 1 capsule by mouth daily 30 capsule 1    meloxicam (MOBIC) 15 MG tablet Take 1 tablet by mouth daily as needed for Pain 30 tablet 1  magnesium oxide (MAG-OX) 400 MG tablet Take 1 tablet by mouth 2 times daily 60 tablet 1    NOVOLOG 100 UNIT/ML injection continuous. Insulin pump averages 50-80 units daily      aspirin 325 MG tablet Take 325 mg by mouth daily.  carvedilol (COREG) 6.25 MG tablet Take 6.25 mg by mouth 2 times daily (with meals).  atorvastatin (LIPITOR) 80 MG tablet Take 80 mg by mouth nightly.  clopidogrel (PLAVIX) 75 MG tablet Take 75 mg by mouth daily.  lisinopril (PRINIVIL;ZESTRIL) 10 MG tablet Take 10 mg by mouth daily.  gabapentin (NEURONTIN) 400 MG capsule Take 1 capsule by mouth 3 times daily for 30 days. 90 capsule 0     No current facility-administered medications for this visit. Goals of current treatment regimen include improvement in pain, restoration of functioning- with focus on improvement in physical performance, general activity, work or disability,emotional distress, health care utilization and  decreased medication consumption. Will continue to monitor progress towards achieving/maintaining therapeutic goals with special emphasis on  1. Improvement in perceived interfernce  of pain with ADL's. Ability to do home exercises independently. Ability to do household chores indoor and/or outdoor work and social and leisure activities. To increase flexibility/ROM, strength and endurance. Improve psychosocial and physical functioning.- he is not showing any significant progress/or showing regression  towards this goal and reassessment and adjustment of goals/treatment have been made. 2. Improving sleep to 6-7 hours a night. Improve mood/ anxiety and depression symptoms such as crying spells, low energy, problems with concentration, motivation.- he is showing progression towards this treatment goal with the current regimen. 3. Reduction of reliance on opioid analgesia/more appropriate opioid use. - he is showing progression towards this treatment goal with the current regimen.    4. Ability to focus/concentrate at work and perform the duties required of him at work  Sit through a workday without lower extremity symptoms. Stand 30-60 minutes without lower extremity symptoms. Ability to lift up to 10-20 lbs. Ability to go up and down stairs. Sit 30-60 minutes  Without having to stand up frequently. - he is maintaining/progressing towards his work related goals with the current regimen. Risks and benefits of the medications and other alternative treatments have been/were  discussed with the patient. Any questions on the  common side effects of these medications were also answered. He was advised against drinking alcohol with the narcotic pain medicines, advised against driving or handling machinery when  starting or adjusting the dose of medicines, feeling groggy or drowsy, or if having any cognitive issues related to the current medications. Heis fully aware of the risk of overdose and death, if medicines are misused and not taken as prescribed. If he develops new symptoms or if the symptoms worsen, he was told to call the office. .  Thank you for allowing me to participate in the care of this patient.     Sissy Borges MD    Cc: Hamilton Garza MD

## 2022-05-23 ENCOUNTER — OFFICE VISIT (OUTPATIENT)
Dept: PAIN MANAGEMENT | Age: 57
End: 2022-05-23
Payer: COMMERCIAL

## 2022-05-23 VITALS
HEART RATE: 70 BPM | OXYGEN SATURATION: 97 % | DIASTOLIC BLOOD PRESSURE: 98 MMHG | SYSTOLIC BLOOD PRESSURE: 158 MMHG | WEIGHT: 193.4 LBS | BODY MASS INDEX: 27.75 KG/M2

## 2022-05-23 DIAGNOSIS — S33.5XXA SPRAIN OF LOW BACK, INITIAL ENCOUNTER: ICD-10-CM

## 2022-05-23 DIAGNOSIS — S33.5XXA LOW BACK SPRAIN, INITIAL ENCOUNTER: ICD-10-CM

## 2022-05-23 DIAGNOSIS — G89.4 CHRONIC PAIN SYNDROME: ICD-10-CM

## 2022-05-23 DIAGNOSIS — S33.5XXA LUMBAR SPRAIN, INITIAL ENCOUNTER: ICD-10-CM

## 2022-05-23 DIAGNOSIS — S33.5XXA SPRAIN OF LUMBAR REGION, INITIAL ENCOUNTER: ICD-10-CM

## 2022-05-23 DIAGNOSIS — M51.27 PROLAPSED LUMBOSACRAL INTERVERTEBRAL DISC: ICD-10-CM

## 2022-05-23 PROCEDURE — 99214 OFFICE O/P EST MOD 30 MIN: CPT | Performed by: INTERNAL MEDICINE

## 2022-05-23 RX ORDER — HYDROCODONE BITARTRATE AND ACETAMINOPHEN 7.5; 325 MG/1; MG/1
1 TABLET ORAL EVERY 6 HOURS PRN
Qty: 84 TABLET | Refills: 0 | Status: SHIPPED | OUTPATIENT
Start: 2022-05-23 | End: 2022-06-20 | Stop reason: SDUPTHER

## 2022-05-23 RX ORDER — MORPHINE SULFATE 30 MG/1
30 CAPSULE, EXTENDED RELEASE ORAL DAILY
Qty: 28 CAPSULE | Refills: 0 | Status: SHIPPED | OUTPATIENT
Start: 2022-05-23 | End: 2022-06-20 | Stop reason: SDUPTHER

## 2022-05-23 NOTE — PROGRESS NOTES
[facility]  1965  [id number]    HISTORY OF PRESENT ILLNESS:  Mr. Suellen Cameron is a 59 y.o. male returns for a follow up visit for multiple medical problems. His  presenting problems are   1. BWC Low back sprain, initial encounter    2. BWC Sprain of lumbar region, initial encounter    3. BWC Lumbar sprain, initial encounter    4. BWC Sprain of low back, initial encounter    5. bwc-Prolapsed lumbosacral intervertebral disc    . As per information/history obtained from the PADT(patient assessment and documentation tool) -  He complains of pain in the lower back with radiation to the buttocks, hips Left, upper leg Left and knees Left He rates the pain 7/10 and describes it as sharp, aching, numbness. Pain is made worse by: movement, walking, bending, lifting. Current treatment regimen has helped relieve about 60% of the pain. He denies side effects from the current pain regimen. Patient reports that since the last follow up visit the physical functioning is worse, family/social relationships are unchanged, mood is unchanged and sleep patterns are worse, and that the overall functioning is unchanged. Patient denies neurological bowel or bladder. Patient denies misusing/abusing his narcotic pain medications or using any illegal drugs. There are No indicators for possible drug abuse, addiction or diversion problems. Upon obtaining the medical history from Mr. Suellen Cameron regarding today's office visit for his presenting problems, patient states he has been sick for a few days. Mr. Suellen Cameron mentions he is going on vacation for a week. He says he is using Jackie along with Norco for btp. Patient denies any side effects with the medications. Patient reports he is working full time still. Patient states his sleep is fair. Has fairly normal sleep latency. Averages about 4-6 hours of sleep a night. Denies any signs of sleep apnea. Feels somewhat rested in the morning, he is on Seroquel.  Patient's  subjective report of his mood is fair. he describes occasional symptoms of depression, occasional  irritability and some mood swings. Describes his mood as being neutral and reports some pleasure in his daily activities. Reports  fair  appetite, energy and concentration. Able to function well in different aspects of his daily activities. Denies suicidal or homicidal ideation. Denies any complaints of increased tension, does   Worry sometimes and occasional  irritability  he denies any c/o increased anxiety, No c/o panic attacks or symptoms of PTSD, he is on Cymbalta. Patient is complaining of his back hurting worse than his legs. ALLERGIES/PAST MED/FAM/SOC HISTORY: Mr. Kevan Vasquez allergies, past medical, family and social history were reviewed in the chart. Mr. Kevan Vasquez current medications are   Outpatient Medications Prior to Visit   Medication Sig Dispense Refill    morphine (LAURA) 30 MG extended release capsule Take 1 capsule by mouth daily for 35 days. 35 capsule 0    HYDROcodone-acetaminophen (NORCO) 7.5-325 MG per tablet Take 1 tablet by mouth every 6 hours as needed for Pain (max 3 per day) for up to 35 days. 105 tablet 0    QUEtiapine (SEROQUEL) 25 MG tablet Take 1-2 tablets by mouth nightly 60 tablet 1    meloxicam (MOBIC) 15 MG tablet Take 1 tablet by mouth daily as needed for Pain 30 tablet 1    magnesium oxide (MAG-OX) 400 MG tablet Take 1 tablet by mouth 2 times daily 60 tablet 1    DULoxetine (CYMBALTA) 60 MG extended release capsule Take 1 capsule by mouth daily 30 capsule 0    NOVOLOG 100 UNIT/ML injection continuous. Insulin pump averages 50-80 units daily      aspirin 325 MG tablet Take 325 mg by mouth daily.  carvedilol (COREG) 6.25 MG tablet Take 6.25 mg by mouth 2 times daily (with meals).  atorvastatin (LIPITOR) 80 MG tablet Take 80 mg by mouth nightly.  clopidogrel (PLAVIX) 75 MG tablet Take 75 mg by mouth daily.  lisinopril (PRINIVIL;ZESTRIL) 10 MG tablet Take 10 mg by mouth daily.       gabapentin (NEURONTIN) 400 MG capsule Take 1 capsule by mouth 3 times daily for 30 days. 90 capsule 1     No facility-administered medications prior to visit. REVIEW OF SYSTEMS: .   Respiratory: Negative for shortness of breath. Cardiovascular: Negative for chest pain, palpitations  Gastrointestinal: Negative for blood in stool, abdominal distention, nausea, vomiting, abdominal pain, diarrhea,constipation. Neurological: Negative for speech difficulty, weakness and light-headedness, dizziness, tremors, sleepiness  Psychiatric/Behavioral: Negative for suicidal ideas, hallucinations, behavioral problems, self-injury, decreased concentration/cognition, agitation, confusion. PHYSICAL EXAM:   Nursing note and vitals reviewed. BP (!) 158/98   Pulse 70   Wt 193 lb 6.4 oz (87.7 kg)   SpO2 97%   BMI 27.75 kg/m²   General Appearance: Patient is well nourished, well developed, well groomed and in no acute distress. Skin: Skin is warm and dry, good turgor . No rash or lesions noted. He is not diaphoretic. Pulmonary/Chest: Effort normal. No respiratory distress or use of accessory muscles. Auscultation revealing normal air entry. He does not have wheezes in the lung fields. He has no rales. Cardiovascular: Normal rate, regular rhythm, normal heart sounds, and does not have murmur. Exam reveals no gallop and no friction rub. Musculoskeletal / Extremities: Range of motion is normal. Gait is normal, assistive devices use: none. He exhibits edema: none, and no tenderness. Neurological/Psychiatric:He is alert and oriented to person, place, and time. Coordination is  normal.   Judgement and Insight is normal  His mood is Appropriate and affect is Neutral/Euthymic(normal) . His behavior is normal.   thought content normal.        IMPRESSION:     1. BWC Low back sprain, initial encounter    2. BWC Sprain of lumbar region, initial encounter    3. BWC Lumbar sprain, initial encounter    4.  BWC Sprain of low back, initial encounter    5. bwc-Prolapsed lumbosacral intervertebral disc    6. Chronic pain syndrome    7. Lumbar sprain, initial encounter    8. Sprain of low back, initial encounter        PLAN:  Informed verbal consent was obtained. -[place] was obtained and reviewed  for the last one year and no indicators of drug misuse  were found. Any other controlled substance prescriptions  seen on the record have been accounted for, I am aware of the patient receiving these medications. Sari Alcocer [place] record will be rechecked as part of office protocol. -ROM/Stretching exercises as advised   -He was advised to increase fluids ( 5-7  glasses of fluid daily), limit caffeine, avoid cheese products, increase dietary fiber, increase activity and exercise as tolerated and relax regularly and enjoy meals   -Continue with Jackie along with Norco 3 per day for btp  -he was advised proper sleep hygiene-told to avoid:use of caffeine or other stimulants after noon, alcohol use near bedtime, long or frequent naps during the day, erratic sleep schedule, heavy meals near bedtime, vigorous exercise near bedtime and use of electronic devices near bedtime   -Continue with Seroquel   -CBT techniques- relaxation therapies such as biofeedback, mindfulness based stress reduction, imagery, cognitive restructuring, problem solving discussed with patient   -Continue with Cymbalta 60 mg   -Continue with Mobic 15 mg daily     Mr. Noe Rosenbaum will be prescribed  the medications  listed below which are for treatment of his presenting  medical problems which for this visit include:   Diagnoses of BWC Low back sprain, initial encounter, BWC Sprain of lumbar region, initial encounter, BWC Lumbar sprain, initial encounter, BWC Sprain of low back, initial encounter, and bwc-Prolapsed lumbosacral intervertebral disc were pertinent to this visit.   Medications/orders associated with this visit:    Current Outpatient Medications   Medication Sig Dispense Refill    morphine (LAURA) 30 MG extended release capsule Take 1 capsule by mouth daily for 35 days. 35 capsule 0    HYDROcodone-acetaminophen (NORCO) 7.5-325 MG per tablet Take 1 tablet by mouth every 6 hours as needed for Pain (max 3 per day) for up to 35 days. 105 tablet 0    QUEtiapine (SEROQUEL) 25 MG tablet Take 1-2 tablets by mouth nightly 60 tablet 1    meloxicam (MOBIC) 15 MG tablet Take 1 tablet by mouth daily as needed for Pain 30 tablet 1    magnesium oxide (MAG-OX) 400 MG tablet Take 1 tablet by mouth 2 times daily 60 tablet 1    DULoxetine (CYMBALTA) 60 MG extended release capsule Take 1 capsule by mouth daily 30 capsule 0    NOVOLOG 100 UNIT/ML injection continuous. Insulin pump averages 50-80 units daily      aspirin 325 MG tablet Take 325 mg by mouth daily.  carvedilol (COREG) 6.25 MG tablet Take 6.25 mg by mouth 2 times daily (with meals).  atorvastatin (LIPITOR) 80 MG tablet Take 80 mg by mouth nightly.  clopidogrel (PLAVIX) 75 MG tablet Take 75 mg by mouth daily.  lisinopril (PRINIVIL;ZESTRIL) 10 MG tablet Take 10 mg by mouth daily.  gabapentin (NEURONTIN) 400 MG capsule Take 1 capsule by mouth 3 times daily for 30 days. 90 capsule 1     No current facility-administered medications for this visit. Goals of current treatment regimen include improvement in pain, restoration of functioning- with focus on improvement in physical performance, general activity, work or disability,emotional distress, health care utilization and  decreased medication consumption. Will continue to monitor progress towards achieving/maintaining therapeutic goals with special emphasis on  1. Improvement in perceived interfernce  of pain with ADL's. Ability to do home exercises independently. Ability to do household chores indoor and/or outdoor work and social and leisure activities. To increase flexibility/ROM, strength and endurance.  Improve psychosocial and physical functioning.- he is not showing any significant progress/or showing regression  towards this goal and reassessment and adjustment of goals/treatment have been made. 2. Improving sleep to 6-7 hours a night. Improve mood/ anxiety and depression symptoms such as crying spells, low energy, problems with concentration, motivation.- he is showing progression towards this treatment goal with the current regimen. 3. Reduction of reliance on opioid analgesia/more appropriate opioid use. - he is showing progression towards this treatment goal with the current regimen. 4. Ability to focus/concentrate at work and perform the duties required of him at work  Sit through a workday without lower extremity symptoms. Stand 30-60 minutes without lower extremity symptoms. Ability to lift up to 10-20 lbs. Ability to go up and down stairs. Sit 30-60 minutes  Without having to stand up frequently. - he is maintaining/progressing towards his work related goals with the current regimen. Risks and benefits of the medications and other alternative treatments have been/were  discussed with the patient. Any questions on the  common side effects of these medications were also answered. He was advised against drinking alcohol with the narcotic pain medicines, advised against driving or handling machinery when  starting or adjusting the dose of medicines, feeling groggy or drowsy, or if having any cognitive issues related to the current medications. Heis fully aware of the risk of overdose and death, if medicines are misused and not taken as prescribed. If he develops new symptoms or if the symptoms worsen, he was told to call the office. .  Thank you for allowing me to participate in the care of this patient.     Gaby Castro MD    Cc: Jihan Hancock MD

## 2022-06-20 ENCOUNTER — OFFICE VISIT (OUTPATIENT)
Dept: PAIN MANAGEMENT | Age: 57
End: 2022-06-20
Payer: COMMERCIAL

## 2022-06-20 VITALS
BODY MASS INDEX: 27.92 KG/M2 | SYSTOLIC BLOOD PRESSURE: 144 MMHG | WEIGHT: 195 LBS | HEART RATE: 72 BPM | DIASTOLIC BLOOD PRESSURE: 82 MMHG | HEIGHT: 70 IN

## 2022-06-20 DIAGNOSIS — S33.5XXA LOW BACK SPRAIN, INITIAL ENCOUNTER: ICD-10-CM

## 2022-06-20 DIAGNOSIS — S33.5XXA LUMBAR SPRAIN, INITIAL ENCOUNTER: ICD-10-CM

## 2022-06-20 DIAGNOSIS — S33.5XXA SPRAIN OF LUMBAR REGION, INITIAL ENCOUNTER: ICD-10-CM

## 2022-06-20 DIAGNOSIS — M51.27 PROLAPSED LUMBOSACRAL INTERVERTEBRAL DISC: ICD-10-CM

## 2022-06-20 DIAGNOSIS — S33.5XXA SPRAIN OF LOW BACK, INITIAL ENCOUNTER: ICD-10-CM

## 2022-06-20 PROCEDURE — 99213 OFFICE O/P EST LOW 20 MIN: CPT | Performed by: INTERNAL MEDICINE

## 2022-06-20 RX ORDER — MELOXICAM 15 MG/1
15 TABLET ORAL DAILY PRN
Qty: 30 TABLET | Refills: 1 | Status: SHIPPED | OUTPATIENT
Start: 2022-06-20 | End: 2022-08-12 | Stop reason: SDUPTHER

## 2022-06-20 RX ORDER — GABAPENTIN 400 MG/1
400 CAPSULE ORAL 3 TIMES DAILY
Qty: 90 CAPSULE | Refills: 1 | Status: SHIPPED | OUTPATIENT
Start: 2022-06-20 | End: 2022-07-15 | Stop reason: SDUPTHER

## 2022-06-20 RX ORDER — MORPHINE SULFATE 30 MG/1
30 CAPSULE, EXTENDED RELEASE ORAL DAILY
Qty: 28 CAPSULE | Refills: 0 | Status: SHIPPED | OUTPATIENT
Start: 2022-06-20 | End: 2022-07-15 | Stop reason: SDUPTHER

## 2022-06-20 RX ORDER — HYDROCODONE BITARTRATE AND ACETAMINOPHEN 7.5; 325 MG/1; MG/1
1 TABLET ORAL EVERY 6 HOURS PRN
Qty: 84 TABLET | Refills: 0 | Status: SHIPPED | OUTPATIENT
Start: 2022-06-20 | End: 2022-07-15 | Stop reason: SDUPTHER

## 2022-06-20 RX ORDER — QUETIAPINE FUMARATE 25 MG/1
25-50 TABLET, FILM COATED ORAL NIGHTLY
Qty: 60 TABLET | Refills: 1 | Status: SHIPPED | OUTPATIENT
Start: 2022-06-20 | End: 2022-07-15 | Stop reason: SDUPTHER

## 2022-06-20 RX ORDER — MAGNESIUM OXIDE 400 MG/1
400 TABLET ORAL 2 TIMES DAILY
Qty: 60 TABLET | Refills: 1 | Status: SHIPPED | OUTPATIENT
Start: 2022-06-20 | End: 2022-08-12 | Stop reason: SDUPTHER

## 2022-06-20 RX ORDER — DULOXETIN HYDROCHLORIDE 60 MG/1
60 CAPSULE, DELAYED RELEASE ORAL DAILY
Qty: 30 CAPSULE | Refills: 1 | Status: SHIPPED | OUTPATIENT
Start: 2022-06-20 | End: 2022-07-15 | Stop reason: SDUPTHER

## 2022-06-20 NOTE — PROGRESS NOTES
Chasity [place]  1965  [id number]      HISTORY OF PRESENT ILLNESS:  Mr. Rodney Rai is a 59 y.o. male returns for a follow up visit for pain management  He has a diagnosis of   1. BWC Low back sprain, initial encounter    2. BWC Sprain of lumbar region, initial encounter    3. BWC Lumbar sprain, initial encounter    4. BWC Sprain of low back, initial encounter    . He complains of pain in the lower back with radiation to the left knee  He rates the pain 6/10 and describes it as sharp, aching, burning. Current treatment regimen has helped relieve about 60% of the pain. He denies any side effects from the current pain regimen. Patient reports that since the last follow up visit the physical functioning is unchanged, family/social relationships are unchanged, mood is unchanged sleep patterns are unchanged, and that the overall functioning is unchanged. Patient denies misusing/abusing his narcotic pain medications or using any illegal drugs. There are No indicators for possible drug abuse, addiction or diversion problems, patient states he has been doing fair, his pain has been manageable with the medications. Mr. Rodney Rai mention he is using Laura along with Norco 3 per day, he denies any side effects and is managing okay with them. Patient reports he is working full time still. He says he has been using all the adjuvants. ALLERGIES: Patients list of allergies were reviewed     MEDICATIONS: Mr. Rodney Rai list of medications were reviewed. His current medications are   Outpatient Medications Prior to Visit   Medication Sig Dispense Refill    morphine (LAURA) 30 MG extended release capsule Take 1 capsule by mouth daily for 28 days. 28 capsule 0    HYDROcodone-acetaminophen (NORCO) 7.5-325 MG per tablet Take 1 tablet by mouth every 6 hours as needed for Pain (max 3 per day) for up to 28 days.  84 tablet 0    QUEtiapine (SEROQUEL) 25 MG tablet Take 1-2 tablets by mouth nightly 60 tablet 1    meloxicam (MOBIC) 15 MG tablet Take 1 tablet by mouth daily as needed for Pain 30 tablet 1    magnesium oxide (MAG-OX) 400 MG tablet Take 1 tablet by mouth 2 times daily 60 tablet 1    gabapentin (NEURONTIN) 400 MG capsule Take 1 capsule by mouth 3 times daily for 30 days. 90 capsule 1    DULoxetine (CYMBALTA) 60 MG extended release capsule Take 1 capsule by mouth daily 30 capsule 0    NOVOLOG 100 UNIT/ML injection continuous. Insulin pump averages 50-80 units daily      aspirin 325 MG tablet Take 325 mg by mouth daily.  carvedilol (COREG) 6.25 MG tablet Take 6.25 mg by mouth 2 times daily (with meals).  atorvastatin (LIPITOR) 80 MG tablet Take 80 mg by mouth nightly.  clopidogrel (PLAVIX) 75 MG tablet Take 75 mg by mouth daily.  lisinopril (PRINIVIL;ZESTRIL) 10 MG tablet Take 10 mg by mouth daily. No facility-administered medications prior to visit. REVIEW OF SYSTEMS:    Respiratory: Negative for apnea, chest tightness and shortness of breath or change in baseline breathing. PHYSICAL EXAM:   Nursing note and vitals reviewed. BP (!) 144/82 (Site: Right Upper Arm, Position: Sitting)   Pulse 72   Ht 5' 10\" (1.778 m)   Wt 195 lb (88.5 kg)   BMI 27.98 kg/m²   Constitutional: He appears well-developed and well-nourished. No acute distress. Cardiovascular: Normal rate, regular rhythm, normal heart sounds, and does not have murmur. Pulmonary/Chest: Effort normal. No respiratory distress. He does not have wheezes in the lung fields. He has no rales. Neurological/Psychiatric:He is alert and oriented to person, place, and time. Coordination is  normal.  His mood isAppropriate and affect is Neutral/Euthymic(normal) . His  Other: trace edema     IMPRESSION:   1. BWC Low back sprain, initial encounter    2. BWC Sprain of lumbar region, initial encounter    3. BWC Lumbar sprain, initial encounter    4.  BWC Sprain of low back, initial encounter    5. bwc-Prolapsed lumbosacral intervertebral disc PLAN:  Informed verbal consent was obtained  -ROM/Stretching exercises as advised   -He was advised to increase fluids ( 5-7  glasses of fluid daily), limit caffeine, avoid cheese products, increase dietary fiber, increase activity and exercise as tolerated and relax regularly and enjoy meals   -Continue with Laura along with Norco 3 per day  -Continue with all other adjuvant medications as before      Current Outpatient Medications   Medication Sig Dispense Refill    morphine (LAURA) 30 MG extended release capsule Take 1 capsule by mouth daily for 28 days. 28 capsule 0    HYDROcodone-acetaminophen (NORCO) 7.5-325 MG per tablet Take 1 tablet by mouth every 6 hours as needed for Pain (max 3 per day) for up to 28 days. 84 tablet 0    QUEtiapine (SEROQUEL) 25 MG tablet Take 1-2 tablets by mouth nightly 60 tablet 1    meloxicam (MOBIC) 15 MG tablet Take 1 tablet by mouth daily as needed for Pain 30 tablet 1    magnesium oxide (MAG-OX) 400 MG tablet Take 1 tablet by mouth 2 times daily 60 tablet 1    gabapentin (NEURONTIN) 400 MG capsule Take 1 capsule by mouth 3 times daily for 30 days. 90 capsule 1    DULoxetine (CYMBALTA) 60 MG extended release capsule Take 1 capsule by mouth daily 30 capsule 0    NOVOLOG 100 UNIT/ML injection continuous. Insulin pump averages 50-80 units daily      aspirin 325 MG tablet Take 325 mg by mouth daily.  carvedilol (COREG) 6.25 MG tablet Take 6.25 mg by mouth 2 times daily (with meals).  atorvastatin (LIPITOR) 80 MG tablet Take 80 mg by mouth nightly.  clopidogrel (PLAVIX) 75 MG tablet Take 75 mg by mouth daily.  lisinopril (PRINIVIL;ZESTRIL) 10 MG tablet Take 10 mg by mouth daily. No current facility-administered medications for this visit. I will continue his current medication regimen  which is part of the above treatment schedule.  It has been helping with Mr. Galen Ng chronic  medical problems which for this visit include:   Diagnoses of [facility] Low back sprain, initial encounter, C Sprain of lumbar region, initial encounter, [facility] Lumbar sprain, initial encounter, and C Sprain of low back, initial encounter were pertinent to this visit. Risks and benefits of the medications and other alternative treatments  including no treatment were discussed with the patient. The common side effects of these medications were also explained to the patient. Informed verbal consent was obtained. Goals of current treatment regimen include improvement in pain, restoration of functioning- with focus on improvement in physical performance, general activity, work or disability,emotional distress, health care utilization and  decreased medication consumption. Will continue to monitor progress towards achieving/maintaining therapeutic goals with special emphasis on  1. Improvement in perceived interfernce  of pain with ADL's. Ability to do home exercises independently. Ability to do household chores indoor and/or outdoor work and social and leisure activities. Improve psychosocial and physical functioning. - he is showing progression towards this treatment goal with the current regimen. 2. Ability to focus/concentrate at work and perform the duties required of him at work  Sit through a workday without lower extremity symptoms. Stand 30-60 minutes without lower extremity symptoms. Ability to lift up to 10-20 lbs. Ability to go up and down stairs. Sit 30-60 minutes  Without having to stand up frequently. - he is maintaining/progressing towards his work related goals with the current regimen. He was advised against drinking alcohol with the narcotic pain medicines, advised against driving or handling machinery while adjusting the dose of medicines or if having cognitive  issues related to the current medications. Risk of overdose and death, if medicines not taken as prescribed, were also discussed.  If the patient develops new symptoms or if the symptoms worsen, the patient should call the office. While transcribing every attempt was made to maintain the accuracy of the note in terms of it's contents,there may have been some errors made inadvertently. Thank you for allowing me to participate in the care of this patient.     Olive Lowe MD.    Cc: Natalya Bull MD

## 2022-07-15 ENCOUNTER — OFFICE VISIT (OUTPATIENT)
Dept: PAIN MANAGEMENT | Age: 57
End: 2022-07-15
Payer: COMMERCIAL

## 2022-07-15 VITALS
HEART RATE: 93 BPM | BODY MASS INDEX: 27.92 KG/M2 | SYSTOLIC BLOOD PRESSURE: 140 MMHG | DIASTOLIC BLOOD PRESSURE: 84 MMHG | HEIGHT: 70 IN | WEIGHT: 195 LBS | OXYGEN SATURATION: 99 %

## 2022-07-15 DIAGNOSIS — M51.27 PROLAPSED LUMBOSACRAL INTERVERTEBRAL DISC: ICD-10-CM

## 2022-07-15 DIAGNOSIS — S33.5XXA SPRAIN OF LUMBAR REGION, INITIAL ENCOUNTER: ICD-10-CM

## 2022-07-15 DIAGNOSIS — S33.5XXA LUMBAR SPRAIN, INITIAL ENCOUNTER: ICD-10-CM

## 2022-07-15 DIAGNOSIS — S33.5XXA SPRAIN OF LOW BACK, INITIAL ENCOUNTER: ICD-10-CM

## 2022-07-15 DIAGNOSIS — S33.5XXA LOW BACK SPRAIN, INITIAL ENCOUNTER: ICD-10-CM

## 2022-07-15 PROCEDURE — 99214 OFFICE O/P EST MOD 30 MIN: CPT | Performed by: INTERNAL MEDICINE

## 2022-07-15 RX ORDER — QUETIAPINE FUMARATE 25 MG/1
25-50 TABLET, FILM COATED ORAL NIGHTLY
Qty: 60 TABLET | Refills: 1 | Status: SHIPPED | OUTPATIENT
Start: 2022-07-15 | End: 2022-08-12 | Stop reason: SDUPTHER

## 2022-07-15 RX ORDER — GABAPENTIN 400 MG/1
400 CAPSULE ORAL 3 TIMES DAILY
Qty: 90 CAPSULE | Refills: 1 | Status: SHIPPED | OUTPATIENT
Start: 2022-07-15 | End: 2022-08-12 | Stop reason: SDUPTHER

## 2022-07-15 RX ORDER — DULOXETIN HYDROCHLORIDE 60 MG/1
60 CAPSULE, DELAYED RELEASE ORAL DAILY
Qty: 30 CAPSULE | Refills: 1 | Status: SHIPPED | OUTPATIENT
Start: 2022-07-15 | End: 2022-08-12 | Stop reason: SDUPTHER

## 2022-07-15 RX ORDER — HYDROCODONE BITARTRATE AND ACETAMINOPHEN 7.5; 325 MG/1; MG/1
1 TABLET ORAL EVERY 6 HOURS PRN
Qty: 84 TABLET | Refills: 0 | Status: SHIPPED | OUTPATIENT
Start: 2022-07-15 | End: 2022-08-12 | Stop reason: SDUPTHER

## 2022-07-15 RX ORDER — MORPHINE SULFATE 30 MG/1
30 CAPSULE, EXTENDED RELEASE ORAL DAILY
Qty: 28 CAPSULE | Refills: 0 | Status: SHIPPED | OUTPATIENT
Start: 2022-07-15 | End: 2022-08-12 | Stop reason: SDUPTHER

## 2022-07-15 NOTE — PROGRESS NOTES
Jomar [place]  1965  [id number]      HISTORY OF PRESENT ILLNESS:  Mr. Jamal Brown is a 59 y.o. male returns for a follow up visit for pain management  He has a diagnosis of   1. BWC Low back sprain, initial encounter    2. BWC Sprain of lumbar region, initial encounter    3. BWC Lumbar sprain, initial encounter    4. BWC Sprain of low back, initial encounter    5. bwc-Prolapsed lumbosacral intervertebral disc    . He complains of pain in the  right elbow, lower back, left hip, left leg, left knee  He rates the pain 6/10 and describes it as sharp, aching, burning. Current treatment regimen has helped relieve about 60% of the pain. He denies any side effects from the current pain regimen. Patient reports that since the last follow up visit the physical functioning is worse, family/social relationships are unchanged, mood is unchanged sleep patterns are unchanged, and that the overall functioning is worse. Patient denies misusing/abusing his narcotic pain medications or using any illegal drugs. There are No indicators for possible drug abuse, addiction or diversion problems.  reports he has been doing about the same. He complains he is having increase pain in the right elbow and right hip, \" I cant function because of the increase pain\". He mentions he is using Jackie along with Norco and Mobic. He says he is working full times still, having to use his hands a lot. Patient states his sleep is fair. Has fairly normal sleep latency. Averages about 4-6 hours of sleep a night. Denies any signs of sleep apnea. Feels somewhat rested in the morning. He mentions he is on Seroquel. Patient's  subjective report of his mood is fair. he describes occasional symptoms of depression, occasional  irritability and some mood swings. Describes his mood as being neutral and reports some pleasure in his daily activities. Reports  fair  appetite, energy and concentration.  Able to function well in different aspects of his daily activities. Denies suicidal or homicidal ideation. Denies any complaints of increased tension, does   Worry sometimes and occasional  irritability  he denies any c/o increased anxiety, No c/o panic attacks or symptoms of PTSD. He states he is using Cymbalta. ALLERGIES/PAST MED/FAM/SOC HISTORY: Mr. Eric Landers allergies, past medical, family and social history were reviewed in the chart. Mr. Eric Landers current medications are   Outpatient Medications Prior to Visit   Medication Sig Dispense Refill    meloxicam (MOBIC) 15 MG tablet Take 1 tablet by mouth daily as needed for Pain 30 tablet 1    magnesium oxide (MAG-OX) 400 MG tablet Take 1 tablet by mouth 2 times daily 60 tablet 1    NOVOLOG 100 UNIT/ML injection continuous. Insulin pump averages 50-80 units daily      aspirin 325 MG tablet Take 325 mg by mouth daily. carvedilol (COREG) 6.25 MG tablet Take 6.25 mg by mouth 2 times daily (with meals). atorvastatin (LIPITOR) 80 MG tablet Take 80 mg by mouth nightly. clopidogrel (PLAVIX) 75 MG tablet Take 75 mg by mouth daily. lisinopril (PRINIVIL;ZESTRIL) 10 MG tablet Take 10 mg by mouth daily. morphine (LAURA) 30 MG extended release capsule Take 1 capsule by mouth daily for 28 days. 28 capsule 0    HYDROcodone-acetaminophen (NORCO) 7.5-325 MG per tablet Take 1 tablet by mouth every 6 hours as needed for Pain (max 3 per day) for up to 28 days. 84 tablet 0    QUEtiapine (SEROQUEL) 25 MG tablet Take 1-2 tablets by mouth nightly 60 tablet 1    gabapentin (NEURONTIN) 400 MG capsule Take 1 capsule by mouth 3 times daily for 30 days. 90 capsule 1    DULoxetine (CYMBALTA) 60 MG extended release capsule Take 1 capsule by mouth daily 30 capsule 1     No facility-administered medications prior to visit. REVIEW OF SYSTEMS: .   Respiratory: Negative for shortness of breath.     Cardiovascular: Negative for chest pain, palpitations  Gastrointestinal: Negative for blood in stool, abdominal distention, nausea, vomiting, abdominal pain, diarrhea,constipation. Neurological: Negative for speech difficulty, weakness and light-headedness, dizziness, tremors, sleepiness  Psychiatric/Behavioral: Negative for suicidal ideas, hallucinations, behavioral problems, self-injury, decreased concentration/cognition, agitation, confusion. PHYSICAL EXAM:   Nursing note and vitals reviewed. BP (!) 140/84 (Site: Right Upper Arm, Position: Sitting)   Pulse 93   Ht 5' 10\" (1.778 m)   Wt 195 lb (88.5 kg)   SpO2 99%   BMI 27.98 kg/m²   General Appearance: Patient is well nourished, well developed, well groomed and in no acute distress. Skin: Skin is warm and dry, good turgor . No rash or lesions noted. He is not diaphoretic. Pulmonary/Chest: Effort normal. No respiratory distress or use of accessory muscles. Auscultation revealing normal air entry. He does not have wheezes in the lung fields. He has no rales. Cardiovascular: Normal rate, regular rhythm, normal heart sounds, and does not have murmur. Exam reveals no gallop and no friction rub. Musculoskeletal / Extremities: Range of motion is normal. Gait is normal, assistive devices use: none. He exhibits edema: none, and + tenderness right elbow lateral condyle    Neurological/Psychiatric:He is alert and oriented to person, place, and time. Coordination is  normal.   Judgement and Insight is normal  His mood is Appropriate and affect is Neutral/Euthymic(normal) . His behavior is normal.   thought content normal.        IMPRESSION:     1. BWC Low back sprain, initial encounter    2. BWC Sprain of lumbar region, initial encounter    3. BWC Lumbar sprain, initial encounter    4. BWC Sprain of low back, initial encounter    5. bwc-Prolapsed lumbosacral intervertebral disc        PLAN:  Informed verbal consent was obtained.   -ROM/Stretching exercises as advised   -Exercises given for right elbow tendonitis and hip tendonitis  -he was advised proper sleep hygiene-told to avoid:use of caffeine or other stimulants after noon, alcohol use near bedtime, long or frequent naps during the day, erratic sleep schedule, heavy meals near bedtime, vigorous exercise near bedtime and use of electronic devices near bedtime   -Continue with Seroquel 25 mg 1-2 at night   -.constpiatione   -CBT techniques- relaxation therapies such as biofeedback, mindfulness based stress reduction, imagery, cognitive restructuring, problem solving discussed with patient   -Walking as tolerated 20-30 daily    Mr. Holly Armando will be prescribed  the medications  listed below which are for treatment of his presenting  medical problems which for this visit include:   Diagnoses of BWC Low back sprain, initial encounter, BWC Sprain of lumbar region, initial encounter, BWC Lumbar sprain, initial encounter, BWC Sprain of low back, initial encounter, and bwc-Prolapsed lumbosacral intervertebral disc were pertinent to this visit. Medications/orders associated with this visit:    Current Outpatient Medications   Medication Sig Dispense Refill    morphine (LAURA) 30 MG extended release capsule Take 1 capsule by mouth in the morning for 28 days. 28 capsule 0    HYDROcodone-acetaminophen (NORCO) 7.5-325 MG per tablet Take 1 tablet by mouth every 6 hours as needed for Pain (max 3 per day) for up to 28 days. 84 tablet 0    QUEtiapine (SEROQUEL) 25 MG tablet Take 1-2 tablets by mouth nightly 60 tablet 1    gabapentin (NEURONTIN) 400 MG capsule Take 1 capsule by mouth in the morning and 1 capsule at noon and 1 capsule before bedtime. Do all this for 30 days. 90 capsule 1    DULoxetine (CYMBALTA) 60 MG extended release capsule Take 1 capsule by mouth in the morning. 30 capsule 1    meloxicam (MOBIC) 15 MG tablet Take 1 tablet by mouth daily as needed for Pain 30 tablet 1    magnesium oxide (MAG-OX) 400 MG tablet Take 1 tablet by mouth 2 times daily 60 tablet 1    NOVOLOG 100 UNIT/ML injection continuous.  Insulin pump averages 50-80 units daily      aspirin 325 MG tablet Take 325 mg by mouth daily. carvedilol (COREG) 6.25 MG tablet Take 6.25 mg by mouth 2 times daily (with meals). atorvastatin (LIPITOR) 80 MG tablet Take 80 mg by mouth nightly. clopidogrel (PLAVIX) 75 MG tablet Take 75 mg by mouth daily. lisinopril (PRINIVIL;ZESTRIL) 10 MG tablet Take 10 mg by mouth daily. cefdinir (OMNICEF) 300 MG capsule Take 1 capsule by mouth in the morning and 1 capsule before bedtime. Do all this for 7 days. 14 capsule 0    phenazopyridine (PYRIDIUM) 100 MG tablet Take 1 tablet by mouth 3 times daily as needed for Pain 20 tablet 0    oxyCODONE-acetaminophen (PERCOCET) 5-325 MG per tablet Take 1 tablet by mouth every 6 hours as needed for Pain for up to 3 days. Intended supply: 3 days. Take lowest dose possible to manage pain 12 tablet 0     No current facility-administered medications for this visit. Goals of current treatment regimen include improvement in pain, restoration of functioning- with focus on improvement in physical performance, general activity, work or disability,emotional distress, health care utilization and  decreased medication consumption. Will continue to monitor progress towards achieving/maintaining therapeutic goals with special emphasis on  1. Improvement in perceived interfernce  of pain with ADL's. Ability to do home exercises independently. Ability to do household chores indoor and/or outdoor work and social and leisure activities. To increase flexibility/ROM, strength and endurance. Improve psychosocial and physical functioning.- he is showing progression towards this treatment goal with the current regimen. 2. Improving sleep to 6-7 hours a night. Improve mood/ anxiety and depression symptoms such as crying spells, low energy, problems with concentration, motivation.- he is showing progression towards this treatment goal with the current regimen.    3. Reduction of reliance on opioid analgesia/more appropriate opioid use. - he is showing progression towards this treatment goal with the current regimen. 4. Ability to focus/concentrate at work and perform the duties required of him at work  Sit through a workday without lower extremity symptoms. Stand 30-60 minutes without lower extremity symptoms. Ability to lift up to 10-20 lbs. Ability to go up and down stairs. Sit 30-60 minutes  Without having to stand up frequently. - he is maintaining/progressing towards his work related goals with the current regimen. Risks and benefits of the medications and other alternative treatments have been/were  discussed with the patient. Any questions on the  common side effects of these medications were also answered. He was advised against drinking alcohol with the narcotic pain medicines, advised against driving or handling machinery when  starting or adjusting the dose of medicines, feeling groggy or drowsy, or if having any cognitive issues related to the current medications. Heis fully aware of the risk of overdose and death, if medicines are misused and not taken as prescribed. If he develops new symptoms or if the symptoms worsen, he was told to call the office. .  Thank you for allowing me to participate in the care of this patient.     Johan Grissom MD    Cc: Caden Hunt MD

## 2022-07-16 ENCOUNTER — HOSPITAL ENCOUNTER (EMERGENCY)
Age: 57
Discharge: HOME | End: 2022-07-16
Payer: COMMERCIAL

## 2022-07-16 ENCOUNTER — APPOINTMENT (OUTPATIENT)
Dept: CT IMAGING | Age: 57
End: 2022-07-16
Payer: COMMERCIAL

## 2022-07-16 VITALS
HEART RATE: 73 BPM | DIASTOLIC BLOOD PRESSURE: 82 MMHG | BODY MASS INDEX: 27.26 KG/M2 | WEIGHT: 190 LBS | SYSTOLIC BLOOD PRESSURE: 143 MMHG | RESPIRATION RATE: 16 BRPM | TEMPERATURE: 98.9 F | OXYGEN SATURATION: 99 %

## 2022-07-16 DIAGNOSIS — R31.0 GROSS HEMATURIA: ICD-10-CM

## 2022-07-16 DIAGNOSIS — N30.01 ACUTE CYSTITIS WITH HEMATURIA: ICD-10-CM

## 2022-07-16 DIAGNOSIS — N32.89 BLADDER MASS: Primary | ICD-10-CM

## 2022-07-16 LAB
A/G RATIO: 1.6 (ref 1.1–2.2)
ALBUMIN SERPL-MCNC: 4.1 G/DL (ref 3.4–5)
ALP BLD-CCNC: 81 U/L (ref 40–129)
ALT SERPL-CCNC: 9 U/L (ref 10–40)
ANION GAP SERPL CALCULATED.3IONS-SCNC: 10 MMOL/L (ref 3–16)
AST SERPL-CCNC: 17 U/L (ref 15–37)
BASOPHILS ABSOLUTE: 0.1 K/UL (ref 0–0.2)
BASOPHILS RELATIVE PERCENT: 1.2 %
BILIRUB SERPL-MCNC: 0.5 MG/DL (ref 0–1)
BILIRUBIN URINE: ABNORMAL
BLOOD, URINE: ABNORMAL
BUN BLDV-MCNC: 19 MG/DL (ref 7–20)
CALCIUM SERPL-MCNC: 9.3 MG/DL (ref 8.3–10.6)
CHLORIDE BLD-SCNC: 102 MMOL/L (ref 99–110)
CLARITY: ABNORMAL
CO2: 28 MMOL/L (ref 21–32)
COLOR: ABNORMAL
CREAT SERPL-MCNC: 0.7 MG/DL (ref 0.9–1.3)
EOSINOPHILS ABSOLUTE: 0.9 K/UL (ref 0–0.6)
EOSINOPHILS RELATIVE PERCENT: 12.7 %
GFR AFRICAN AMERICAN: >60
GFR NON-AFRICAN AMERICAN: >60
GLUCOSE BLD-MCNC: 237 MG/DL (ref 70–99)
GLUCOSE URINE: 100 MG/DL
HCT VFR BLD CALC: 43.2 % (ref 40.5–52.5)
HEMOGLOBIN: 14.6 G/DL (ref 13.5–17.5)
KETONES, URINE: ABNORMAL MG/DL
LEUKOCYTE ESTERASE, URINE: ABNORMAL
LYMPHOCYTES ABSOLUTE: 2.2 K/UL (ref 1–5.1)
LYMPHOCYTES RELATIVE PERCENT: 30.7 %
MCH RBC QN AUTO: 30.9 PG (ref 26–34)
MCHC RBC AUTO-ENTMCNC: 33.9 G/DL (ref 31–36)
MCV RBC AUTO: 91.3 FL (ref 80–100)
MICROSCOPIC EXAMINATION: YES
MONOCYTES ABSOLUTE: 0.6 K/UL (ref 0–1.3)
MONOCYTES RELATIVE PERCENT: 8 %
NEUTROPHILS ABSOLUTE: 3.4 K/UL (ref 1.7–7.7)
NEUTROPHILS RELATIVE PERCENT: 47.4 %
NITRITE, URINE: POSITIVE
PDW BLD-RTO: 12.6 % (ref 12.4–15.4)
PH UA: 6.5 (ref 5–8)
PLATELET # BLD: 219 K/UL (ref 135–450)
PMV BLD AUTO: 8.3 FL (ref 5–10.5)
POTASSIUM SERPL-SCNC: 4.4 MMOL/L (ref 3.5–5.1)
PROTEIN UA: >=300 MG/DL
RBC # BLD: 4.73 M/UL (ref 4.2–5.9)
RBC UA: >100 /HPF (ref 0–4)
SODIUM BLD-SCNC: 140 MMOL/L (ref 136–145)
SPECIFIC GRAVITY UA: 1.02 (ref 1–1.03)
SPECIMEN STATUS: NORMAL
TOTAL PROTEIN: 6.6 G/DL (ref 6.4–8.2)
URINE REFLEX TO CULTURE: ABNORMAL
URINE TYPE: ABNORMAL
UROBILINOGEN, URINE: 1 E.U./DL
WBC # BLD: 7.2 K/UL (ref 4–11)
WBC UA: ABNORMAL /HPF (ref 0–5)

## 2022-07-16 PROCEDURE — 99285 EMERGENCY DEPT VISIT HI MDM: CPT

## 2022-07-16 PROCEDURE — 87086 URINE CULTURE/COLONY COUNT: CPT

## 2022-07-16 PROCEDURE — 85025 COMPLETE CBC W/AUTO DIFF WBC: CPT

## 2022-07-16 PROCEDURE — 6370000000 HC RX 637 (ALT 250 FOR IP): Performed by: NURSE PRACTITIONER

## 2022-07-16 PROCEDURE — 51798 US URINE CAPACITY MEASURE: CPT

## 2022-07-16 PROCEDURE — 74177 CT ABD & PELVIS W/CONTRAST: CPT

## 2022-07-16 PROCEDURE — 80053 COMPREHEN METABOLIC PANEL: CPT

## 2022-07-16 PROCEDURE — 81001 URINALYSIS AUTO W/SCOPE: CPT

## 2022-07-16 PROCEDURE — 6360000004 HC RX CONTRAST MEDICATION: Performed by: NURSE PRACTITIONER

## 2022-07-16 RX ORDER — CEFDINIR 300 MG/1
300 CAPSULE ORAL 2 TIMES DAILY
Qty: 14 CAPSULE | Refills: 0 | Status: SHIPPED | OUTPATIENT
Start: 2022-07-16 | End: 2022-07-23

## 2022-07-16 RX ORDER — OXYCODONE HYDROCHLORIDE AND ACETAMINOPHEN 5; 325 MG/1; MG/1
1 TABLET ORAL EVERY 6 HOURS PRN
Qty: 12 TABLET | Refills: 0 | Status: SHIPPED | OUTPATIENT
Start: 2022-07-16 | End: 2022-07-19

## 2022-07-16 RX ORDER — CEFDINIR 300 MG/1
300 CAPSULE ORAL ONCE
Status: COMPLETED | OUTPATIENT
Start: 2022-07-16 | End: 2022-07-16

## 2022-07-16 RX ORDER — PHENAZOPYRIDINE HYDROCHLORIDE 100 MG/1
100 TABLET, FILM COATED ORAL 3 TIMES DAILY PRN
Qty: 20 TABLET | Refills: 0 | Status: SHIPPED | OUTPATIENT
Start: 2022-07-16 | End: 2023-07-16

## 2022-07-16 RX ADMIN — CEFDINIR 300 MG: 300 CAPSULE ORAL at 14:28

## 2022-07-16 RX ADMIN — IOPAMIDOL 75 ML: 755 INJECTION, SOLUTION INTRAVENOUS at 11:43

## 2022-07-16 ASSESSMENT — PAIN - FUNCTIONAL ASSESSMENT: PAIN_FUNCTIONAL_ASSESSMENT: 0-10

## 2022-07-16 ASSESSMENT — LIFESTYLE VARIABLES: HOW OFTEN DO YOU HAVE A DRINK CONTAINING ALCOHOL: NEVER

## 2022-07-16 ASSESSMENT — PAIN DESCRIPTION - LOCATION: LOCATION: ABDOMEN

## 2022-07-16 ASSESSMENT — PAIN SCALES - GENERAL: PAINLEVEL_OUTOF10: 4

## 2022-07-16 NOTE — ED NOTES
Bladder scan prior to urination, approx 336 ml . Urinated 225 ml, kendrick blood. Bladder scan post-void, approx 116 ml. Denies any complaints.  Darina Oconnor NP updated      Anatoliy Leon RN  07/16/22 [id number]

## 2022-07-16 NOTE — ED PROVIDER NOTES
[facility] Emergency Department    CHIEF COMPLAINT  Hematuria (Woke up this am and was \" pissing blood' then he felt lightheaded)      HISTORY OF PRESENT ILLNESS  Arabella Madison is a 59 y.o. male who presents to the ED complaining of hematuria. Patient reports this morning shortly after waking up he went to use the restroom and noticed that his urine was \"bright red\" as soon as he saw the bloody urine he felt lightheaded. Lightheadedness has since resolved. No syncope. Reports every time he is urinated since it has been bright red or a dark red blood. Patient denies any pain or difficulty urinating. Denies abdominal or flank pain. Denies fever, chills, body aches. Patient denies ever experiencing anything like this before. No other complaints, modifying factors or associated symptoms. Nursing notes reviewed.    Past Medical History:   Diagnosis Date    Bleeding nose 11/18/2010    CAD (coronary artery disease)     Chest pain     Chest pain 11/18/2010    Chronic pain     Diabetes     Displacement of lumbar intervertebral disc without myelopathy     Heart attack [facility])     June 2008    Heart disease     Herniated disc     High blood pressure     Nosebleed(s)     Sprain of lumbar region 10/12/2010    Sprain of lumbar region     Sprain of lumbosacral (joint) (ligament) 10/12/10    Sprain of lumbosacral (joint) (ligament)      Past Surgical History:   Procedure Laterality Date    CARDIAC SURGERY  2008, june 2 stents left and lad     Family History   Problem Relation Age of Onset    Other Father         liver problems    Diabetes Other     Other Other         heart disese     Social History     Socioeconomic History    Marital status:      Spouse name: Not on file    Number of children: Not on file    Years of education: Not on file    Highest education level: Not on file   Occupational History    Not on file   Tobacco Use    Smoking status: Light Smoker     Packs/day: 0.50     Years: 15.00     Pack years: 7.50     Types: E-Cigarettes, Cigarettes    Smokeless tobacco: Former   Substance and Sexual Activity    Alcohol use: Yes     Alcohol/week: 2.0 standard drinks     Types: 2 Cans of beer per week     Comment: occa    Drug use: No    Sexual activity: Not on file   Other Topics Concern    Not on file   Social History Narrative    Not on file     Social Determinants of Health     Financial Resource Strain: Not on file   Food Insecurity: Not on file   Transportation Needs: Not on file   Physical Activity: Not on file   Stress: Not on file   Social Connections: Not on file   Intimate Partner Violence: Not on file   Housing Stability: Not on file     No current facility-administered medications for this encounter. Current Outpatient Medications   Medication Sig Dispense Refill    cefdinir (OMNICEF) 300 MG capsule Take 1 capsule by mouth in the morning and 1 capsule before bedtime. Do all this for 7 days. 14 capsule 0    phenazopyridine (PYRIDIUM) 100 MG tablet Take 1 tablet by mouth 3 times daily as needed for Pain 20 tablet 0    oxyCODONE-acetaminophen (PERCOCET) 5-325 MG per tablet Take 1 tablet by mouth every 6 hours as needed for Pain for up to 3 days. Intended supply: 3 days. Take lowest dose possible to manage pain 12 tablet 0    morphine (LAURA) 30 MG extended release capsule Take 1 capsule by mouth in the morning for 28 days. 28 capsule 0    HYDROcodone-acetaminophen (NORCO) 7.5-325 MG per tablet Take 1 tablet by mouth every 6 hours as needed for Pain (max 3 per day) for up to 28 days. 84 tablet 0    QUEtiapine (SEROQUEL) 25 MG tablet Take 1-2 tablets by mouth nightly 60 tablet 1    gabapentin (NEURONTIN) 400 MG capsule Take 1 capsule by mouth in the morning and 1 capsule at noon and 1 capsule before bedtime. Do all this for 30 days. 90 capsule 1    DULoxetine (CYMBALTA) 60 MG extended release capsule Take 1 capsule by mouth in the morning.  30 capsule 1    meloxicam (MOBIC) 15 MG tablet Take 1 tablet by mouth daily as needed for Pain 30 tablet 1    magnesium oxide (MAG-OX) 400 MG tablet Take 1 tablet by mouth 2 times daily 60 tablet 1    NOVOLOG 100 UNIT/ML injection continuous. Insulin pump averages 50-80 units daily      aspirin 325 MG tablet Take 325 mg by mouth daily. carvedilol (COREG) 6.25 MG tablet Take 6.25 mg by mouth 2 times daily (with meals). atorvastatin (LIPITOR) 80 MG tablet Take 80 mg by mouth nightly. clopidogrel (PLAVIX) 75 MG tablet Take 75 mg by mouth daily. lisinopril (PRINIVIL;ZESTRIL) 10 MG tablet Take 10 mg by mouth daily. No Known Allergies    REVIEW OF SYSTEMS  10 systems reviewed, pertinent positives per HPI otherwise noted to be negative    PHYSICAL EXAM  BP (!) 143/82   Pulse 73   Temp 98.9 °F (37.2 °C) (Oral)   Resp 16   Wt 190 lb (86.2 kg)   SpO2 99%   BMI 27.26 kg/m²   GENERAL APPEARANCE: Awake and alert. Cooperative. No acute distress. Vital signs are stable. Well appearing and non toxic. HEAD: Normocephalic. Atraumatic. EYES: PERRL. EOM's grossly intact. ENT: Mucous membranes are moist.   NECK: Supple. Normal ROM. HEART: RRR. Distal pulses are equal and intact. Cap refill less than 2 seconds. LUNGS: Respirations unlabored. CTAB. Good air exchange. Speaking comfortably in full sentences. No wheezing, rhonchi, rales, stridor. ABDOMEN: Soft. Non-distended. Non-tender. No guarding or rebound. No rigidity. Bowel sounds are present. Negative fonseca's. Negative McBurney's point. Negative CVA tenderness. EXTREMITIES: No peripheral edema. Moves all extremities equally. All extremities neurovascularly intact. SKIN: Warm and dry. No acute rashes. NEUROLOGICAL: Alert and oriented. No gross facial drooping. Strength 5/5, sensation intact. PSYCHIATRIC: Normal mood and affect.     SCREENINGS       RADIOLOGY  CT ABDOMEN PELVIS W IV CONTRAST Additional Contrast? None    Result Date: 7/16/2022  EXAMINATION: CT OF THE ABDOMEN AND PELVIS WITH CONTRAST 7/16/2022 11:31 am TECHNIQUE: CT of the abdomen and pelvis was performed with the administration of intravenous contrast. Multiplanar reformatted images are provided for review. Automated exposure control, iterative reconstruction, and/or weight based adjustment of the mA/kV was utilized to reduce the radiation dose to as low as reasonably achievable. COMPARISON: None. HISTORY: ORDERING SYSTEM PROVIDED HISTORY: hematuria TECHNOLOGIST PROVIDED HISTORY: Reason for exam:->hematuria Additional Contrast?->None Decision Support Exception - unselect if not a suspected or confirmed emergency medical condition->Emergency Medical Condition (MA) Reason for Exam: blood in urine today Additional signs and symptoms: denies any pain when he urinates,left flank pain earlier today Relevant Medical/Surgical History: no hx of known kidney stones,no problems urinating FINDINGS: Lower Chest:  Visualized portion of the lower chest demonstrates no acute abnormality. Organs:  Liver enhances normally without evidence of intrahepatic biliary ductal dilatation. The gallbladder is unremarkable. The spleen, pancreas and adrenal glands are unremarkable. The kidneys enhance symmetrically without evidence of hydronephrosis. Delayed images demonstrate no evidence of focal urothelial lesion within the kidneys or ureters. No evidence of ureteral dilatation. GI/Bowel: Stomach and duodenal sweep demonstrate no acute abnormality. The appendix is visualized and is unremarkable. No evidence of acute appendicitis. There is no evidence of bowel obstruction. No evidence of abnormal bowel wall thickening or distension. Pelvis: There is an irregular hyperattenuating solid mass within the right posterior bladder measuring approximately 3.4 x 2.2 x 2.8 cm in size. This is apart and separate from the prostate.   On delayed images, there is a large filling defect in this area with possible areas of surrounding hemorrhage. Findings may represent a bladder neoplasm. Cystoscopic correlation is advised. The prostate itself is unremarkable in appearance. Peritoneum/Retroperitoneum: No evidence of inguinal, pelvic or retroperitoneal lymphadenopathy. Atherosclerotic calcifications of the aorta and branch vessels. No evidence of aortic aneurysm. No evidence of ascites or free air. Bones/Soft Tissues:  Age related degenerative changes of the visualized osseous structures without focal destructive lesion. Irregular 34 x 22 x 28 mm right posterior bladder mass with surrounding hemorrhage. Bladder malignancy is in the differential and cystoscopic correlation is recommended. No hydronephrosis or renal mass. No urothelial lesions in the kidneys or ureters. ED COURSE/MDM  Patient seen and evaluated. Old records reviewed. Diagnostic testing reviewed and results discussed. I have independently evaluated this patient based upon my scope of practice. Supervising physician was in the department for consultation as needed. Catherine Avelar presented to the ED today with above noted complaints. Upon arrival to emergency department vital signs are stable. Nontoxic appearance. Initial work-up included a urinalysis which is notable for a large amount hematuria also positive for nitrates and leukocytes. Urine culture pending. CBC and CMP unremarkable no leukocytosis, bandemia or anemia. No BRENDEN or electrolyte abnormality. CT of the abdomen and pelvis shows an irregular 34 x 22 x 28 mm right posterior bladder mass with surrounding hemorrhage. Bladder malignancy is in the differential and cystoscopy is recommended. No hydronephrosis or renal mass. No urethral lesions in the kidney or ureters. Patient discharged home with antibiotics and referral for urgent urologic follow-up. We discussed strict return precautions including unable to urinate, fever, abdominal or flank pain. Patient verbalized understanding. 0.1 0.0 - 0.2 K/uL   Comprehensive Metabolic Panel   Result Value Ref Range    Sodium 140 136 - 145 mmol/L    Potassium 4.4 3.5 - 5.1 mmol/L    Chloride 102 99 - 110 mmol/L    CO2 28 21 - 32 mmol/L    Anion Gap 10 3 - 16    Glucose 237 (H) 70 - 99 mg/dL    BUN 19 7 - 20 mg/dL    CREATININE 0.7 (L) 0.9 - 1.3 mg/dL    GFR Non-African American >60 >60    GFR African American >60 >60    Calcium 9.3 8.3 - 10.6 mg/dL    Total Protein 6.6 6.4 - 8.2 g/dL    Albumin 4.1 3.4 - 5.0 g/dL    Albumin/Globulin Ratio 1.6 1.1 - 2.2    Total Bilirubin 0.5 0.0 - 1.0 mg/dL    Alkaline Phosphatase 81 40 - 129 U/L    ALT 9 (L) 10 - 40 U/L    AST 17 15 - 37 U/L   Urinalysis with Reflex to Culture    Specimen: Urine, clean catch   Result Value Ref Range    Color, UA RED (A) Straw/Yellow    Clarity, UA TURBID (A) Clear    Glucose, Ur 100 (A) Negative mg/dL    Bilirubin Urine SMALL (A) Negative    Ketones, Urine TRACE (A) Negative mg/dL    Specific Gravity, UA 1.025 1.005 - 1.030    Blood, Urine LARGE (A) Negative    pH, UA 6.5 5.0 - 8.0    Protein, UA >=300 (A) Negative mg/dL    Urobilinogen, Urine 1.0 <2.0 E.U./dL    Nitrite, Urine POSITIVE (A) Negative    Leukocyte Esterase, Urine TRACE (A) Negative    Microscopic Examination YES     Urine Type NotGiven     Urine Reflex to Culture Not Indicated    Sample possible blood bank testing   Result Value Ref Range    Specimen Status HAMLET    Microscopic Urinalysis   Result Value Ref Range    WBC, UA see below (A) 0 - 5 /HPF    RBC, UA >100 (A) 0 - 4 /HPF         I estimate there is LOW risk for ACUTE APPENDICITIS, BOWEL OBSTRUCTION, CHOLECYSTITIS, DIVERTICULITIS, INCARCERATED HERNIA, PANCREATITIS, or PERFORATED BOWEL or ULCER, thus I consider the discharge disposition reasonable. Also, there is no evidence or peritonitis, sepsis, or toxicity. Jomar Madison and I have discussed the diagnosis and risks, and we agree with discharging home to follow-up with their primary doctor.  We also discussed returning to the Emergency Department immediately if new or worsening symptoms occur. We have discussed the symptoms which are most concerning (e.g., bloody stool, fever, changing or worsening pain, vomiting) that necessitate immediate return. FINAL Impression    1. Bladder mass    2. Gross hematuria    3. Acute cystitis with hematuria        Blood pressure (!) 143/82, pulse 73, temperature 98.9 °F (37.2 °C), temperature source Oral, resp. rate 16, weight 190 lb (86.2 kg), SpO2 99 %. mdm    Patient was sent home with a prescription for below medication/s. I did [place] patient on appropriate use of these medication. Discharge Medication List as of 7/16/2022  2:23 PM        START taking these medications    Details   cefdinir (OMNICEF) 300 MG capsule Take 1 capsule by mouth in the morning and 1 capsule before bedtime. Do all this for 7 days. , Disp-14 capsule, R-0Print      phenazopyridine (PYRIDIUM) 100 MG tablet Take 1 tablet by mouth 3 times daily as needed for Pain, Disp-20 tablet, R-0Print      oxyCODONE-acetaminophen (PERCOCET) 5-325 MG per tablet Take 1 tablet by mouth every 6 hours as needed for Pain for up to 3 days. Intended supply: 3 days. Take lowest dose possible to manage pain, Disp-12 tablet, R-0Print                 FOLLOW UP  [facility] [address] [place] [address]  ED  [address] [place] [id number] [phone]        DISPOSITION  Patient was discharged to home in good condition. Comment: Please note this report has been produced using speech recognition software and may contain errors related to that system including errors in grammar, punctuation, and spelling, as well as words and phrases that may be inappropriate. If there are any questions or concerns please feel free to contact the dictating provider for clarification.             MEAGAN Cunha - SHAHAB  07/16/22 [id number]

## 2022-07-17 LAB — URINE CULTURE, ROUTINE: NORMAL

## 2022-08-12 ENCOUNTER — OFFICE VISIT (OUTPATIENT)
Dept: PAIN MANAGEMENT | Age: 57
End: 2022-08-12
Payer: COMMERCIAL

## 2022-08-12 VITALS
HEIGHT: 70 IN | DIASTOLIC BLOOD PRESSURE: 77 MMHG | OXYGEN SATURATION: 98 % | BODY MASS INDEX: 27.77 KG/M2 | HEART RATE: 105 BPM | SYSTOLIC BLOOD PRESSURE: 130 MMHG | WEIGHT: 194 LBS

## 2022-08-12 DIAGNOSIS — G89.4 CHRONIC PAIN SYNDROME: ICD-10-CM

## 2022-08-12 DIAGNOSIS — M51.27 PROLAPSED LUMBOSACRAL INTERVERTEBRAL DISC: ICD-10-CM

## 2022-08-12 DIAGNOSIS — S33.5XXA SPRAIN OF LOW BACK, INITIAL ENCOUNTER: ICD-10-CM

## 2022-08-12 DIAGNOSIS — S33.5XXA SPRAIN OF LUMBAR REGION, INITIAL ENCOUNTER: ICD-10-CM

## 2022-08-12 DIAGNOSIS — S33.5XXA LOW BACK SPRAIN, INITIAL ENCOUNTER: ICD-10-CM

## 2022-08-12 DIAGNOSIS — S33.5XXA LUMBAR SPRAIN, INITIAL ENCOUNTER: ICD-10-CM

## 2022-08-12 PROCEDURE — 99214 OFFICE O/P EST MOD 30 MIN: CPT | Performed by: INTERNAL MEDICINE

## 2022-08-12 RX ORDER — MELOXICAM 15 MG/1
15 TABLET ORAL DAILY PRN
Qty: 30 TABLET | Refills: 1 | Status: SHIPPED | OUTPATIENT
Start: 2022-08-12

## 2022-08-12 RX ORDER — GABAPENTIN 400 MG/1
400 CAPSULE ORAL 3 TIMES DAILY
Qty: 90 CAPSULE | Refills: 1 | Status: SHIPPED | OUTPATIENT
Start: 2022-08-12 | End: 2022-09-11

## 2022-08-12 RX ORDER — HYDROCODONE BITARTRATE AND ACETAMINOPHEN 7.5; 325 MG/1; MG/1
1 TABLET ORAL EVERY 6 HOURS PRN
Qty: 84 TABLET | Refills: 0 | Status: SHIPPED | OUTPATIENT
Start: 2022-08-12 | End: 2022-09-08 | Stop reason: SDUPTHER

## 2022-08-12 RX ORDER — QUETIAPINE FUMARATE 25 MG/1
25-50 TABLET, FILM COATED ORAL NIGHTLY
Qty: 60 TABLET | Refills: 1 | Status: SHIPPED | OUTPATIENT
Start: 2022-08-12 | End: 2022-11-03 | Stop reason: SDUPTHER

## 2022-08-12 RX ORDER — DULOXETIN HYDROCHLORIDE 60 MG/1
60 CAPSULE, DELAYED RELEASE ORAL DAILY
Qty: 30 CAPSULE | Refills: 1 | Status: SHIPPED | OUTPATIENT
Start: 2022-08-12

## 2022-08-12 RX ORDER — MAGNESIUM OXIDE 400 MG/1
400 TABLET ORAL 2 TIMES DAILY
Qty: 60 TABLET | Refills: 1 | Status: SHIPPED | OUTPATIENT
Start: 2022-08-12

## 2022-08-12 RX ORDER — MORPHINE SULFATE 30 MG/1
30 CAPSULE, EXTENDED RELEASE ORAL DAILY
Qty: 28 CAPSULE | Refills: 0 | Status: SHIPPED | OUTPATIENT
Start: 2022-08-12 | End: 2022-09-08 | Stop reason: SDUPTHER

## 2022-08-12 NOTE — PROGRESS NOTES
Reji Nettles  1965  [id number]    HISTORY OF PRESENT ILLNESS:  Mr. Dorthy Nissen is a 59 y.o. male returns for a follow up visit for multiple medical problems. His  presenting problems are   1. BWC Low back sprain, initial encounter    2. BWC Lumbar sprain, initial encounter    3. BWC Sprain of lumbar region, initial encounter    4. BWC Sprain of low back, initial encounter    5. bwc-Prolapsed lumbosacral intervertebral disc    6. Chronic pain syndrome    . As per information/history obtained from the PADT(patient assessment and documentation tool) -  He complains of pain in the lower back with radiation to the buttocks, hips Left, upper leg Left, knees Left, lower leg Left, ankles Left, and feet Left He rates the pain 7/10 and describes it as sharp, aching, burning, numbness, pins and needles. Pain is made worse by: movement, walking, standing, sitting, bending, lifting. Current treatment regimen has helped relieve about 60% of the pain. He denies side effects from the current pain regimen. Patient reports that since the last follow up visit the physical functioning is unchanged, family/social relationships are unchanged, mood is unchanged and sleep patterns are unchanged, and that the overall functioning is unchanged. Patient denies neurological bowel or bladder. Patient denies misusing/abusing his narcotic pain medications or using any illegal drugs. There are No indicators for possible drug abuse, addiction or diversion problems. Upon obtaining the medical history from Mr. Dorthy Nissen regarding today's office visit for his presenting problems, patient states he is doing fair, had a episode of hematuria. He reports he will be going for a procedure on his bladder and is seeing urology. He mentions he has been using Jackie along with Pollock for btp. Patient states his sleep is fair. Has fairly normal sleep latency. Averages about 4-6 hours of sleep a night. Denies any signs of sleep apnea.  Feels somewhat rested in the morning. He says he is using Seroquel. He states his back pain is base line. Patient's  subjective report of his mood is fair. he describes occasional symptoms of depression, occasional  irritability and some mood swings. Describes his mood as being neutral and reports some pleasure in his daily activities. Reports  fair  appetite, energy and concentration. Able to function well in different aspects of his daily activities. Denies suicidal or homicidal ideation. Denies any complaints of increased tension, does   Worry sometimes and occasional  irritability  he denies any c/o increased anxiety, No c/o panic attacks or symptoms of PTSD. He reports he is on Cymbalta. ALLERGIES/PAST MED/FAM/SOC HISTORY: Mr. Lyubov Zazueta allergies, past medical, family and social history were reviewed in the chart. Mr. Lyubov Zazueta current medications are   Outpatient Medications Prior to Visit   Medication Sig Dispense Refill    phenazopyridine (PYRIDIUM) 100 MG tablet Take 1 tablet by mouth 3 times daily as needed for Pain 20 tablet 0    morphine (LAURA) 30 MG extended release capsule Take 1 capsule by mouth in the morning for 28 days. 28 capsule 0    HYDROcodone-acetaminophen (NORCO) 7.5-325 MG per tablet Take 1 tablet by mouth every 6 hours as needed for Pain (max 3 per day) for up to 28 days. 84 tablet 0    QUEtiapine (SEROQUEL) 25 MG tablet Take 1-2 tablets by mouth nightly 60 tablet 1    gabapentin (NEURONTIN) 400 MG capsule Take 1 capsule by mouth in the morning and 1 capsule at noon and 1 capsule before bedtime. Do all this for 30 days. 90 capsule 1    DULoxetine (CYMBALTA) 60 MG extended release capsule Take 1 capsule by mouth in the morning. 30 capsule 1    meloxicam (MOBIC) 15 MG tablet Take 1 tablet by mouth daily as needed for Pain 30 tablet 1    magnesium oxide (MAG-OX) 400 MG tablet Take 1 tablet by mouth 2 times daily 60 tablet 1    NOVOLOG 100 UNIT/ML injection continuous.  Insulin pump averages 50-80 units daily Appropriate and affect is Neutral/Euthymic(normal) . His behavior is normal.   thought content normal.        IMPRESSION:     1. BWC Low back sprain, initial encounter    2. BWC Lumbar sprain, initial encounter    3. BWC Sprain of lumbar region, initial encounter    4. BWC Sprain of low back, initial encounter    5. bwc-Prolapsed lumbosacral intervertebral disc    6. Chronic pain syndrome        PLAN:  Informed verbal consent was obtained. Interim history reviewed   -Imaging studies reviewed   -He was advised to increase fluids ( 5-7  glasses of fluid daily), limit caffeine, avoid cheese products, increase dietary fiber, increase activity and exercise as tolerated and relax regularly and enjoy meals   -Continue with Jackie with Norco 3 x per day   -he was advised proper sleep hygiene-told to avoid:use of caffeine or other stimulants after noon, alcohol use near bedtime, long or frequent naps during the day, erratic sleep schedule, heavy meals near bedtime, vigorous exercise near bedtime and use of electronic devices near bedtime   -continue with Seroquel   -CBT techniques- relaxation therapies such as biofeedback, mindfulness based stress reduction, imagery, cognitive restructuring, problem solving discussed with patient   -Continue with Cymblata   -Continue with Mobic 15 mg daily   -Most recent labs were reviewed and are within normal limits. Will repeat them within next 9-12 months if there is no status change   -[place] was obtained and reviewed  for the last one year and no indicators of drug misuse  were found. Any other controlled substance prescriptions  seen on the record have been accounted for, I am aware of the patient receiving these medications. Elena Rees [place] record will be rechecked as part of office protocol.     Mr. Lyubov Zazueta will be prescribed  the medications  listed below which are for treatment of his presenting  medical problems which for this visit include:   Diagnoses of BWC Low back sprain, performance, general activity, work or disability,emotional distress, health care utilization and  decreased medication consumption. Will continue to monitor progress towards achieving/maintaining therapeutic goals with special emphasis on  1. Improvement in perceived interfernce  of pain with ADL's. Ability to do home exercises independently. Ability to do household chores indoor and/or outdoor work and social and leisure activities. To increase flexibility/ROM, strength and endurance. Improve psychosocial and physical functioning.- he is showing progression towards this treatment goal with the current regimen. 2. Improving sleep to 6-7 hours a night. Improve mood/ anxiety and depression symptoms such as crying spells, low energy, problems with concentration, motivation.- he is showing progression towards this treatment goal with the current regimen. 3. Reduction of reliance on opioid analgesia/more appropriate opioid use. - he is showing progression towards this treatment goal with the current regimen. 4. Ability to focus/concentrate at work and perform the duties required of him at work  Sit through a workday without lower extremity symptoms. Stand 30-60 minutes without lower extremity symptoms. Ability to lift up to 10-20 lbs. Ability to go up and down stairs. Sit 30-60 minutes  Without having to stand up frequently. - he is maintaining/progressing towards his work related goals with the current regimen. Risks and benefits of the medications and other alternative treatments have been/were  discussed with the patient. Any questions on the  common side effects of these medications were also answered. He was advised against drinking alcohol with the narcotic pain medicines, advised against driving or handling machinery when  starting or adjusting the dose of medicines, feeling groggy or drowsy, or if having any cognitive issues related to the current medications.  Heis fully aware of the risk of overdose and death, if medicines are misused and not taken as prescribed. If he develops new symptoms or if the symptoms worsen, he was told to call the office. .  Thank you for allowing me to participate in the care of this patient.     Roshni Garnica MD    Cc: Chung Lucas MD    3

## 2022-09-06 ENCOUNTER — PATIENT MESSAGE (OUTPATIENT)
Dept: PAIN MANAGEMENT | Age: 57
End: 2022-09-06

## 2022-09-06 NOTE — TELEPHONE ENCOUNTER
From: Cristine How  To: Dr. Lilia Meza: 9/6/2022 10:37 AM EDT  Subject: Appointment    Hi  I just want to confirm from a call I received last week that my appointment was moved to Thursday the 8th at 415pm?

## 2022-09-07 NOTE — TELEPHONE ENCOUNTER
From: Cindi Hanna  To: Dr. Meghan Kent: 9/6/2022 10:47 AM EDT  Subject: Change of Pharmacy Issues    Hi  Recently I had to change pharmacy's due to a closing of a nearby [facility]. In turn we went to [facility] and had our records moved. During my last visit to Dr Colt Davis I dropped the scripts off, and [facility] did not carry the Jackie. In turn I had to go to a further away [facility]'s and ended up waiting 4 additional days because they did not carry this. [facility] seems to be dragging their feet ordering Jackie because the pharmacist stated she hasn't seen Jackie in years, yet I have been taking it for over 10 years. Is there any chance someone can call my [facility] and speak to them about this? I truly do not want to go to 2 different pharmacy's to mange these. Or at least discover if there is a medication they carry to determine if Dr Colt Davis would change it on my upcoming appointment on Thursday? Any assistance would be great since I do not seem to be making headway with [facility].  Their number is: [phone]  Please let me know

## 2022-09-08 ENCOUNTER — OFFICE VISIT (OUTPATIENT)
Dept: PAIN MANAGEMENT | Age: 57
End: 2022-09-08
Payer: COMMERCIAL

## 2022-09-08 VITALS
HEART RATE: 83 BPM | WEIGHT: 193 LBS | SYSTOLIC BLOOD PRESSURE: 144 MMHG | DIASTOLIC BLOOD PRESSURE: 87 MMHG | BODY MASS INDEX: 27.69 KG/M2

## 2022-09-08 DIAGNOSIS — S33.5XXA LUMBAR SPRAIN, INITIAL ENCOUNTER: ICD-10-CM

## 2022-09-08 DIAGNOSIS — M51.27 PROLAPSED LUMBOSACRAL INTERVERTEBRAL DISC: ICD-10-CM

## 2022-09-08 DIAGNOSIS — S33.5XXA LOW BACK SPRAIN, INITIAL ENCOUNTER: ICD-10-CM

## 2022-09-08 DIAGNOSIS — S33.5XXA SPRAIN OF LOW BACK, INITIAL ENCOUNTER: ICD-10-CM

## 2022-09-08 DIAGNOSIS — S33.5XXA SPRAIN OF LUMBAR REGION, INITIAL ENCOUNTER: ICD-10-CM

## 2022-09-08 PROCEDURE — 99213 OFFICE O/P EST LOW 20 MIN: CPT | Performed by: INTERNAL MEDICINE

## 2022-09-08 RX ORDER — MORPHINE SULFATE 30 MG/1
30 CAPSULE, EXTENDED RELEASE ORAL DAILY
Qty: 28 CAPSULE | Refills: 0 | Status: SHIPPED | OUTPATIENT
Start: 2022-09-08 | End: 2022-09-08 | Stop reason: CLARIF

## 2022-09-08 RX ORDER — HYDROCODONE BITARTRATE AND ACETAMINOPHEN 7.5; 325 MG/1; MG/1
1 TABLET ORAL EVERY 6 HOURS PRN
Qty: 84 TABLET | Refills: 0 | Status: SHIPPED | OUTPATIENT
Start: 2022-09-08 | End: 2022-10-06 | Stop reason: SDUPTHER

## 2022-09-08 RX ORDER — MORPHINE SULFATE 30 MG/1
30 TABLET, FILM COATED, EXTENDED RELEASE ORAL DAILY
Qty: 28 TABLET | Refills: 0 | Status: SHIPPED | OUTPATIENT
Start: 2022-09-08 | End: 2022-10-06 | Stop reason: SDUPTHER

## 2022-09-08 NOTE — PROGRESS NOTES
Padmini Morse  1965  [id number]      HISTORY OF PRESENT ILLNESS:  Mr. Dixie Klein is a 59 y.o. male returns for a follow up visit for pain management  He has a diagnosis of   1. B[facility] Low back sprain, initial encounter    2. BWC Lumbar sprain, initial encounter    3. BWC Sprain of lumbar region, initial encounter    4. bwc-Prolapsed lumbosacral intervertebral disc    5. BWC Sprain of low back, initial encounter    . He complains of pain in the  lower back, left knee with radiation to the left hip, left upper leg  He rates the pain 8/10 and describes it as sharp, aching, burning. Current treatment regimen has helped relieve about 50% of the pain. He denies any side effects from the current pain regimen. Patient reports that since the last follow up visit the physical functioning is worse, family/social relationships are unchanged, mood is worse sleep patterns are worse, and that the overall functioning is worse. Patient denies misusing/abusing his narcotic pain medications or using any illegal drugs. There are No indicators for possible drug abuse, addiction or diversion problems. Patient states he has been doing fair. 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 He says he will be getting chemo still. He reports he is using Jackie along with Norco 3 per day. He denies any constipation symptoms. ALLERGIES: Patients list of allergies were reviewed     MEDICATIONS: Mr. Dixie Klein list of medications were reviewed. His current medications are   Outpatient Medications Prior to Visit   Medication Sig Dispense Refill    QUEtiapine (SEROQUEL) 25 MG tablet Take 1-2 tablets by mouth nightly 60 tablet 1    gabapentin (NEURONTIN) 400 MG capsule Take 1 capsule by mouth in the morning and 1 capsule at noon and 1 capsule before bedtime. Do all this for 30 days. 90 capsule 1    DULoxetine (CYMBALTA) 60 MG extended release capsule Take 1 capsule by mouth in the morning.  30 capsule 1    meloxicam (MOBIC) 15 MG tablet Take 1 tablet by mouth daily as needed for Pain 30 tablet 1    phenazopyridine (PYRIDIUM) 100 MG tablet Take 1 tablet by mouth 3 times daily as needed for Pain 20 tablet 0    NOVOLOG 100 UNIT/ML injection continuous. Insulin pump averages 50-80 units daily      aspirin 325 MG tablet Take 325 mg by mouth daily. carvedilol (COREG) 6.25 MG tablet Take 6.25 mg by mouth 2 times daily (with meals). atorvastatin (LIPITOR) 80 MG tablet Take 80 mg by mouth nightly. clopidogrel (PLAVIX) 75 MG tablet Take 75 mg by mouth daily. lisinopril (PRINIVIL;ZESTRIL) 10 MG tablet Take 10 mg by mouth daily. magnesium oxide (MAG-OX) 400 MG tablet Take 1 tablet by mouth in the morning and 1 tablet before bedtime. 60 tablet 1    morphine (LAURA) 30 MG extended release capsule Take 1 capsule by mouth in the morning for 28 days. 28 capsule 0    HYDROcodone-acetaminophen (NORCO) 7.5-325 MG per tablet Take 1 tablet by mouth every 6 hours as needed for Pain (max 3 per day) for up to 28 days. 84 tablet 0     No facility-administered medications prior to visit. REVIEW OF SYSTEMS:    Respiratory: Negative for apnea, chest tightness and shortness of breath or change in baseline breathing. PHYSICAL EXAM:   Nursing note and vitals reviewed. BP (!) 144/87   Pulse 83   Wt 193 lb (87.5 kg)   BMI 27.69 kg/m²   Constitutional: He appears well-developed and well-nourished. No acute distress. Cardiovascular: Normal rate, regular rhythm, normal heart sounds, and does not have murmur. Pulmonary/Chest: Effort normal. No respiratory distress. He does not have wheezes in the lung fields. He has no rales. Neurological/Psychiatric:He is alert and oriented to person, place, and time. Coordination is  normal.  His mood isAppropriate and affect is Neutral/Euthymic(normal) . His    IMPRESSION:   1. [facility] Low back sprain, initial encounter    2. [facility] Lumbar sprain, initial encounter    3.  [facility] Sprain of lumbar region, initial encounter    4. BWC-Prolapsed lumbosacral intervertebral disc    5. [facility] Sprain of low back, initial encounter        PLAN:  Informed verbal consent was obtained  -[place] record was obtained and reviewed  for the last one year and no indicators of drug misuse  were found. Any other controlled substance prescriptions  seen on the record have been accounted for, I am aware of the patient receiving these medications. Ayla Cueto [place] record will be rechecked as part of office protocol.    -Interm history reviewed    -Continue with Ms Contin along with Percocet 3 per day for btp  -He was advised to increase fluids ( 5-7  glasses of fluid daily), limit caffeine, avoid cheese products, increase dietary fiber, increase activity and exercise as tolerated and relax regularly and enjoy meals   -Walking 20 minutes daily as tolerated    Current Outpatient Medications   Medication Sig Dispense Refill    HYDROcodone-acetaminophen (NORCO) 7.5-325 MG per tablet Take 1 tablet by mouth every 6 hours as needed for Pain (max 3 per day) for up to 28 days. 84 tablet 0    morphine (MS CONTIN) 30 MG extended release tablet Take 1 tablet by mouth daily for 28 days. 28 tablet 0    QUEtiapine (SEROQUEL) 25 MG tablet Take 1-2 tablets by mouth nightly 60 tablet 1    gabapentin (NEURONTIN) 400 MG capsule Take 1 capsule by mouth in the morning and 1 capsule at noon and 1 capsule before bedtime. Do all this for 30 days. 90 capsule 1    DULoxetine (CYMBALTA) 60 MG extended release capsule Take 1 capsule by mouth in the morning. 30 capsule 1    meloxicam (MOBIC) 15 MG tablet Take 1 tablet by mouth daily as needed for Pain 30 tablet 1    phenazopyridine (PYRIDIUM) 100 MG tablet Take 1 tablet by mouth 3 times daily as needed for Pain 20 tablet 0    NOVOLOG 100 UNIT/ML injection continuous. Insulin pump averages 50-80 units daily      aspirin 325 MG tablet Take 325 mg by mouth daily.       carvedilol (COREG) 6.25 MG tablet Take 6.25 mg by mouth 2 times daily (with meals). atorvastatin (LIPITOR) 80 MG tablet Take 80 mg by mouth nightly. clopidogrel (PLAVIX) 75 MG tablet Take 75 mg by mouth daily. lisinopril (PRINIVIL;ZESTRIL) 10 MG tablet Take 10 mg by mouth daily. magnesium oxide (MAG-OX) 400 MG tablet Take 1 tablet by mouth in the morning and 1 tablet before bedtime. 60 tablet 1     No current facility-administered medications for this visit. I will continue his current medication regimen  which is part of the above treatment schedule. It has been helping with Mr. Babar Harrison chronic  medical problems which for this visit include:   Diagnoses of BWC Low back sprain, initial encounter, BWC Lumbar sprain, initial encounter, BWC Sprain of lumbar region, initial encounter, bwc-Prolapsed lumbosacral intervertebral disc, and BWC Sprain of low back, initial encounter were pertinent to this visit. Risks and benefits of the medications and other alternative treatments  including no treatment were discussed with the patient. The common side effects of these medications were also explained to the patient. Informed verbal consent was obtained. Goals of current treatment regimen include improvement in pain, restoration of functioning- with focus on improvement in physical performance, general activity, work or disability,emotional distress, health care utilization and  decreased medication consumption. Will continue to monitor progress towards achieving/maintaining therapeutic goals with special emphasis on  1. Improvement in perceived interfernce  of pain with ADL's. Ability to do home exercises independently. Ability to do household chores indoor and/or outdoor work and social and leisure activities. Improve psychosocial and physical functioning. - he is showing progression towards this treatment goal with the current regimen.      He was advised against drinking alcohol with the narcotic pain medicines, advised against driving or handling machinery while adjusting the dose of medicines or if having cognitive  issues related to the current medications. Risk of overdose and death, if medicines not taken as prescribed, were also discussed. If the patient develops new symptoms or if the symptoms worsen, the patient should call the office. While transcribing every attempt was made to maintain the accuracy of the note in terms of it's contents,there may have been some errors made inadvertently. Thank you for allowing me to participate in the care of this patient.     Neel Sharp MD.    Cc: Micha Givens MD

## 2022-09-09 ENCOUNTER — TELEPHONE (OUTPATIENT)
Dept: PAIN MANAGEMENT | Age: 57
End: 2022-09-09

## 2022-09-12 NOTE — TELEPHONE ENCOUNTER
Submitted PA for MORPHINE  [facility] Key: [id number]: APPROVED from 9/9/22-3/9/23. If this requires a response please respond to the pool ( P [id number] [address] [facility]). Thank you please advise patient.

## 2022-10-06 ENCOUNTER — OFFICE VISIT (OUTPATIENT)
Dept: PAIN MANAGEMENT | Age: 57
End: 2022-10-06
Payer: COMMERCIAL

## 2022-10-06 VITALS
DIASTOLIC BLOOD PRESSURE: 72 MMHG | SYSTOLIC BLOOD PRESSURE: 162 MMHG | BODY MASS INDEX: 27.75 KG/M2 | OXYGEN SATURATION: 98 % | HEART RATE: 73 BPM | WEIGHT: 193.4 LBS

## 2022-10-06 DIAGNOSIS — S33.5XXA LUMBAR SPRAIN, INITIAL ENCOUNTER: ICD-10-CM

## 2022-10-06 DIAGNOSIS — M51.27 PROLAPSED LUMBOSACRAL INTERVERTEBRAL DISC: ICD-10-CM

## 2022-10-06 DIAGNOSIS — S33.5XXA SPRAIN OF LUMBAR REGION, INITIAL ENCOUNTER: ICD-10-CM

## 2022-10-06 DIAGNOSIS — G89.4 CHRONIC PAIN SYNDROME: ICD-10-CM

## 2022-10-06 DIAGNOSIS — S33.5XXA SPRAIN OF LOW BACK, INITIAL ENCOUNTER: ICD-10-CM

## 2022-10-06 DIAGNOSIS — S33.5XXA LOW BACK SPRAIN, INITIAL ENCOUNTER: ICD-10-CM

## 2022-10-06 PROCEDURE — 99214 OFFICE O/P EST MOD 30 MIN: CPT | Performed by: INTERNAL MEDICINE

## 2022-10-06 RX ORDER — MORPHINE SULFATE 30 MG/1
30 TABLET, FILM COATED, EXTENDED RELEASE ORAL DAILY
Qty: 28 TABLET | Refills: 0 | Status: SHIPPED | OUTPATIENT
Start: 2022-10-06 | End: 2022-11-03 | Stop reason: SDUPTHER

## 2022-10-06 RX ORDER — HYDROCODONE BITARTRATE AND ACETAMINOPHEN 7.5; 325 MG/1; MG/1
1 TABLET ORAL EVERY 6 HOURS PRN
Qty: 84 TABLET | Refills: 0 | Status: SHIPPED | OUTPATIENT
Start: 2022-10-06 | End: 2022-11-03 | Stop reason: SDUPTHER

## 2022-10-08 NOTE — PROGRESS NOTES
Maged Sullivan  1965  [id number]      HISTORY OF PRESENT ILLNESS:  Mr. Clare Rice is a 59 y.o. male returns for a follow up visit for pain management  He has a diagnosis of   1. BWC Low back sprain, initial encounter    2. BWC-Prolapsed lumbosacral intervertebral disc    3. BWC Lumbar sprain, initial encounter    4. BWC Sprain of low back, initial encounter    5. BWC Sprain of lumbar region, initial encounter    6. Chronic pain syndrome    . He complains of pain in the  tailbone, left knee with radiation to the left upper leg  He rates the pain 7/10 and describes it as sharp, aching, burning. Current treatment regimen has helped relieve about 50% of the pain. He denies any side effects from the current pain regimen. Patient reports that since the last follow up visit the physical functioning is worse, family/social relationships are unchanged, mood is worse sleep patterns are unchanged, and that the overall functioning is worse. Patient denies misusing/abusing his narcotic pain medications or using any illegal drugs. There are No indicators for possible drug abuse, addiction or diversion problems,patient states he ha been doing fair, pain has been manageable. She says she is using Ms Contin along with Sandhya Rojo. She says she is working full time. She mentions she is using Mobic 15 mg daily. She denies any constipation symptoms. She mentions no GERD symptoms. Patient states his sleep is fair. Has fairly normal sleep latency. Averages about 4-6 hours of sleep a night. Denies any signs of sleep apnea. Feels somewhat rested in the morning. Patient's  subjective report of his mood is fair. he describes occasional symptoms of depression, occasional  irritability and some mood swings. Describes his mood as being neutral and reports some pleasure in his daily activities. Reports  fair  appetite, energy and concentration. Able to function well in different aspects of his daily activities.  Denies suicidal or homicidal ideation. Denies any complaints of increased tension, does   Worry sometimes and occasional  irritability  he denies any c/o increased anxiety, No c/o panic attacks or symptoms of PTSD     ALLERGIES/PAST MED/FAM/SOC HISTORY: Mr. Magui Allison allergies, past medical, family and social history were reviewed in the chart. Mr. Magui Allison current medications are   Outpatient Medications Prior to Visit   Medication Sig Dispense Refill    QUEtiapine (SEROQUEL) 25 MG tablet Take 1-2 tablets by mouth nightly 60 tablet 1    DULoxetine (CYMBALTA) 60 MG extended release capsule Take 1 capsule by mouth in the morning. 30 capsule 1    meloxicam (MOBIC) 15 MG tablet Take 1 tablet by mouth daily as needed for Pain 30 tablet 1    phenazopyridine (PYRIDIUM) 100 MG tablet Take 1 tablet by mouth 3 times daily as needed for Pain 20 tablet 0    NOVOLOG 100 UNIT/ML injection continuous. Insulin pump averages 50-80 units daily      aspirin 325 MG tablet Take 325 mg by mouth daily. carvedilol (COREG) 6.25 MG tablet Take 6.25 mg by mouth 2 times daily (with meals). atorvastatin (LIPITOR) 80 MG tablet Take 80 mg by mouth nightly. clopidogrel (PLAVIX) 75 MG tablet Take 75 mg by mouth daily. lisinopril (PRINIVIL;ZESTRIL) 10 MG tablet Take 10 mg by mouth daily. HYDROcodone-acetaminophen (NORCO) 7.5-325 MG per tablet Take 1 tablet by mouth every 6 hours as needed for Pain (max 3 per day) for up to 28 days. 84 tablet 0    morphine (MS CONTIN) 30 MG extended release tablet Take 1 tablet by mouth daily for 28 days. 28 tablet 0    gabapentin (NEURONTIN) 400 MG capsule Take 1 capsule by mouth in the morning and 1 capsule at noon and 1 capsule before bedtime. Do all this for 30 days. 90 capsule 1    magnesium oxide (MAG-OX) 400 MG tablet Take 1 tablet by mouth in the morning and 1 tablet before bedtime. 60 tablet 1     No facility-administered medications prior to visit.        REVIEW OF SYSTEMS: .   Respiratory: Negative for shortness of breath. Cardiovascular: Negative for chest pain, palpitations  Gastrointestinal: Negative for blood in stool, abdominal distention, nausea, vomiting, abdominal pain, diarrhea,constipation. Neurological: Negative for speech difficulty, weakness and light-headedness, dizziness, tremors, sleepiness  Psychiatric/Behavioral: Negative for suicidal ideas, hallucinations, behavioral problems, self-injury, decreased concentration/cognition, agitation, confusion. PHYSICAL EXAM:   Nursing note and vitals reviewed. BP (!) 162/72   Pulse 73   Wt 193 lb 6.4 oz (87.7 kg)   SpO2 98%   BMI 27.75 kg/m²   General Appearance: Patient is well nourished, well developed, well groomed and in no acute distress. Skin: Skin is warm and dry, good turgor . No rash or lesions noted. He is not diaphoretic. Pulmonary/Chest: Effort normal. No respiratory distress or use of accessory muscles. Auscultation revealing normal air entry. He does not have wheezes in the lung fields. He has no rales. Cardiovascular: Normal rate, regular rhythm, normal heart sounds, and does not have murmur. Exam reveals no gallop and no friction rub. Musculoskeletal / Extremities: Range of motion is normal. Gait is normal, assistive devices use: none. He exhibits edema: none, and no tenderness. Neurological/Psychiatric:He is alert and oriented to person, place, and time. Coordination is  normal.   Judgement and Insight is normal  His mood is Appropriate and affect is Neutral/Euthymic(normal) . His behavior is normal.   thought content normal.        IMPRESSION:     1. [facility] Low back sprain, initial encounter    2. BWC-Prolapsed lumbosacral intervertebral disc    3. BWC Lumbar sprain, initial encounter    4. [facility] Sprain of low back, initial encounter    5. BWC Sprain of lumbar region, initial encounter    6.  Chronic pain syndrome        PLAN:  Informed verbal consent was obtained.  -Continue with Ms Contin along with Norco 3 per day  -Continue wit Neurotin 400 mg TID, he thinks he is taking 1 per day  -he was advised proper sleep hygiene-told to avoid:use of caffeine or other stimulants after noon, alcohol use near bedtime, long or frequent naps during the day, erratic sleep schedule, heavy meals near bedtime, vigorous exercise near bedtime and use of electronic devices near bedtime   -Continue with Seroquel 25 mg 1-2 nightly   -CBT techniques- relaxation therapies such as biofeedback, mindfulness based stress reduction, imagery, cognitive restructuring, problem solving discussed with patient   -Continue with Cymbalta 60 mg   -Continue with Mobic 15 mg daily   -[place] record was obtained and reviewed  for the last one year and no indicators of drug misuse  were found. Any other controlled substance prescriptions  seen on the record have been accounted for, I am aware of the patient receiving these medications. Nata Pittman [place] record will be rechecked as part of office protocol.    -Continue with all other adjuvant medications as before   -Discussed use, benefit, and side effects of prescribed medications. Barriers to medication compliance addressed. All patient questions answered. Pt voiced understanding. Mr. Gene Cheatham will be prescribed  the medications  listed below which are for treatment of his presenting  medical problems which for this visit include:   Diagnoses of BWC Low back sprain, initial encounter, BWC-Prolapsed lumbosacral intervertebral disc, BWC Lumbar sprain, initial encounter, BWC Sprain of low back, initial encounter, BWC Sprain of lumbar region, initial encounter, and Chronic pain syndrome were pertinent to this visit. Medications/orders associated with this visit:    Current Outpatient Medications   Medication Sig Dispense Refill    HYDROcodone-acetaminophen (NORCO) 7.5-325 MG per tablet Take 1 tablet by mouth every 6 hours as needed for Pain (max 3 per day) for up to 28 days.  84 tablet 0    morphine (MS CONTIN) 30 MG extended release tablet Take 1 tablet by mouth daily for 28 days. 28 tablet 0    QUEtiapine (SEROQUEL) 25 MG tablet Take 1-2 tablets by mouth nightly 60 tablet 1    DULoxetine (CYMBALTA) 60 MG extended release capsule Take 1 capsule by mouth in the morning. 30 capsule 1    meloxicam (MOBIC) 15 MG tablet Take 1 tablet by mouth daily as needed for Pain 30 tablet 1    phenazopyridine (PYRIDIUM) 100 MG tablet Take 1 tablet by mouth 3 times daily as needed for Pain 20 tablet 0    NOVOLOG 100 UNIT/ML injection continuous. Insulin pump averages 50-80 units daily      aspirin 325 MG tablet Take 325 mg by mouth daily. carvedilol (COREG) 6.25 MG tablet Take 6.25 mg by mouth 2 times daily (with meals). atorvastatin (LIPITOR) 80 MG tablet Take 80 mg by mouth nightly. clopidogrel (PLAVIX) 75 MG tablet Take 75 mg by mouth daily. lisinopril (PRINIVIL;ZESTRIL) 10 MG tablet Take 10 mg by mouth daily. gabapentin (NEURONTIN) 400 MG capsule Take 1 capsule by mouth in the morning and 1 capsule at noon and 1 capsule before bedtime. Do all this for 30 days. 90 capsule 1    magnesium oxide (MAG-OX) 400 MG tablet Take 1 tablet by mouth in the morning and 1 tablet before bedtime. 60 tablet 1     No current facility-administered medications for this visit. Goals of current treatment regimen include improvement in pain, restoration of functioning- with focus on improvement in physical performance, general activity, work or disability,emotional distress, health care utilization and  decreased medication consumption. Will continue to monitor progress towards achieving/maintaining therapeutic goals with special emphasis on  1. Improvement in perceived interfernce  of pain with ADL's. Ability to do home exercises independently. Ability to do household chores indoor and/or outdoor work and social and leisure activities. To increase flexibility/ROM, strength and endurance.  Improve psychosocial and physical functioning.- he is not showing any significant progress/or showing regression  towards this goal and reassessment and adjustment of goals/treatment have been made. 2. Improving sleep to 6-7 hours a night. Improve mood/ anxiety and depression symptoms such as crying spells, low energy, problems with concentration, motivation.- he is showing progression towards this treatment goal with the current regimen. 3. Reduction of reliance on opioid analgesia/more appropriate opioid use. - he is showing progression towards this treatment goal with the current regimen. Risks and benefits of the medications and other alternative treatments have been/were  discussed with the patient. Any questions on the  common side effects of these medications were also answered. He was advised against drinking alcohol with the narcotic pain medicines, advised against driving or handling machinery when  starting or adjusting the dose of medicines, feeling groggy or drowsy, or if having any cognitive issues related to the current medications. Heis fully aware of the risk of overdose and death, if medicines are misused and not taken as prescribed. If he develops new symptoms or if the symptoms worsen, he was told to call the office. .  Thank you for allowing me to participate in the care of this patient.     Cristiane Cedeno MD    Cc: Cliff Sanderson MD

## 2022-11-03 ENCOUNTER — OFFICE VISIT (OUTPATIENT)
Dept: PAIN MANAGEMENT | Age: 57
End: 2022-11-03
Payer: COMMERCIAL

## 2022-11-03 VITALS
WEIGHT: 194 LBS | BODY MASS INDEX: 27.84 KG/M2 | SYSTOLIC BLOOD PRESSURE: 137 MMHG | HEART RATE: 79 BPM | DIASTOLIC BLOOD PRESSURE: 81 MMHG

## 2022-11-03 DIAGNOSIS — S33.5XXA SPRAIN OF LUMBAR REGION, INITIAL ENCOUNTER: ICD-10-CM

## 2022-11-03 DIAGNOSIS — S33.5XXA SPRAIN OF LOW BACK, INITIAL ENCOUNTER: ICD-10-CM

## 2022-11-03 DIAGNOSIS — M51.27 PROLAPSED LUMBOSACRAL INTERVERTEBRAL DISC: ICD-10-CM

## 2022-11-03 DIAGNOSIS — S33.5XXA LUMBAR SPRAIN, INITIAL ENCOUNTER: ICD-10-CM

## 2022-11-03 DIAGNOSIS — S33.5XXA LOW BACK SPRAIN, INITIAL ENCOUNTER: ICD-10-CM

## 2022-11-03 PROCEDURE — 3078F DIAST BP <80 MM HG: CPT | Performed by: INTERNAL MEDICINE

## 2022-11-03 PROCEDURE — 99214 OFFICE O/P EST MOD 30 MIN: CPT | Performed by: INTERNAL MEDICINE

## 2022-11-03 PROCEDURE — 3074F SYST BP LT 130 MM HG: CPT | Performed by: INTERNAL MEDICINE

## 2022-11-03 RX ORDER — MORPHINE SULFATE 30 MG/1
30 TABLET, FILM COATED, EXTENDED RELEASE ORAL DAILY
Qty: 30 TABLET | Refills: 0 | Status: SHIPPED | OUTPATIENT
Start: 2022-11-03 | End: 2022-12-03

## 2022-11-03 RX ORDER — QUETIAPINE FUMARATE 25 MG/1
25-50 TABLET, FILM COATED ORAL NIGHTLY
Qty: 60 TABLET | Refills: 1 | Status: SHIPPED | OUTPATIENT
Start: 2022-11-03

## 2022-11-03 RX ORDER — HYDROCODONE BITARTRATE AND ACETAMINOPHEN 7.5; 325 MG/1; MG/1
1 TABLET ORAL EVERY 6 HOURS PRN
Qty: 90 TABLET | Refills: 0 | Status: SHIPPED | OUTPATIENT
Start: 2022-11-03 | End: 2022-12-03

## 2022-11-03 RX ORDER — METHYLPREDNISOLONE 4 MG/1
TABLET ORAL
Qty: 1 KIT | Refills: 0 | Status: SHIPPED | OUTPATIENT
Start: 2022-11-03 | End: 2022-11-23 | Stop reason: SDUPTHER

## 2022-11-03 RX ORDER — TIZANIDINE 4 MG/1
4 TABLET ORAL 2 TIMES DAILY
Qty: 15 TABLET | Refills: 0 | Status: SHIPPED | OUTPATIENT
Start: 2022-11-03

## 2022-11-03 NOTE — PROGRESS NOTES
Danny Alfreda  1965  [id number]    HISTORY OF PRESENT ILLNESS:  Mr. Lawyer Day is a 59 y.o. male returns for a follow up visit for multiple medical problems. His  presenting problems are   1. BWC Low back sprain, initial encounter    2. BWC-Prolapsed lumbosacral intervertebral disc    3. BWC Lumbar sprain, initial encounter    4. BWC Sprain of low back, initial encounter    5. BWC Sprain of lumbar region, initial encounter    . As per information/history obtained from the PADT(patient assessment and documentation tool) -  He complains of pain in the upper back and lower back He rates the pain 9/10 and describes it as sharp, aching, burning. Pain is made worse by: movement, walking, standing, sitting, bending, lifting. Current treatment regimen has helped relieve about 50% of the pain. He denies side effects from the current pain regimen. Patient reports that since the last follow up visit the physical functioning is worse, family/social relationships are unchanged, mood is worse and sleep patterns are worse, and that the overall functioning is worse. Patient denies neurological bowel or bladder. Patient denies misusing/abusing his narcotic pain medications or using any illegal drugs. There are No indicators for possible drug abuse, addiction or diversion problems. Upon obtaining the medical history from Mr. Lawyer Day regarding today's office visit for his presenting problems, patient states he is having pain between his shoulder blades, he \"feels pinched nerve,\" he states he woke up with the pain. Mr. Lawyer Day says his arms are feeling numb. He states he is using Ms Contin along with Sylva for btp. Patient states his pain has been miserable. Patient says his back pain is baseline. Patient reports he is unable to go to work due too pain. Patient states his sleep is fair. Has fairly normal sleep latency. Averages about 4-6 hours of sleep a night. Denies any signs of sleep apnea. Feels somewhat rested in the morning. Patient's  subjective report of his mood is fair. he describes occasional symptoms of depression, occasional  irritability and some mood swings. Describes his mood as being neutral and reports some pleasure in his daily activities. Reports  fair  appetite, energy and concentration. Able to function well in different aspects of his daily activities. Denies suicidal or homicidal ideation. Denies any complaints of increased tension, does   Worry sometimes and occasional  irritability  he denies any c/o increased anxiety, No c/o panic attacks or symptoms of PTSD. ALLERGIES/PAST MED/FAM/SOC HISTORY: Mr. Jessenia Mejia allergies, past medical, family and social history were reviewed in the chart. Mr. Jessenia Mejia current medications are   Outpatient Medications Prior to Visit   Medication Sig Dispense Refill    HYDROcodone-acetaminophen (NORCO) 7.5-325 MG per tablet Take 1 tablet by mouth every 6 hours as needed for Pain (max 3 per day) for up to 28 days. 84 tablet 0    morphine (MS CONTIN) 30 MG extended release tablet Take 1 tablet by mouth daily for 28 days. 28 tablet 0    QUEtiapine (SEROQUEL) 25 MG tablet Take 1-2 tablets by mouth nightly 60 tablet 1    DULoxetine (CYMBALTA) 60 MG extended release capsule Take 1 capsule by mouth in the morning. 30 capsule 1    meloxicam (MOBIC) 15 MG tablet Take 1 tablet by mouth daily as needed for Pain 30 tablet 1    phenazopyridine (PYRIDIUM) 100 MG tablet Take 1 tablet by mouth 3 times daily as needed for Pain 20 tablet 0    NOVOLOG 100 UNIT/ML injection continuous. Insulin pump averages 50-80 units daily      aspirin 325 MG tablet Take 325 mg by mouth daily. carvedilol (COREG) 6.25 MG tablet Take 6.25 mg by mouth 2 times daily (with meals). atorvastatin (LIPITOR) 80 MG tablet Take 80 mg by mouth nightly. clopidogrel (PLAVIX) 75 MG tablet Take 75 mg by mouth daily. lisinopril (PRINIVIL;ZESTRIL) 10 MG tablet Take 10 mg by mouth daily.       gabapentin (NEURONTIN) 400 MG capsule Take 1 capsule by mouth in the morning and 1 capsule at noon and 1 capsule before bedtime. Do all this for 30 days. 90 capsule 1    magnesium oxide (MAG-OX) 400 MG tablet Take 1 tablet by mouth in the morning and 1 tablet before bedtime. 60 tablet 1     No facility-administered medications prior to visit. REVIEW OF SYSTEMS: .   Respiratory: Negative for shortness of breath. Cardiovascular: Negative for chest pain, palpitations  Gastrointestinal: Negative for blood in stool, abdominal distention, nausea, vomiting, abdominal pain, diarrhea,constipation. Neurological: Negative for speech difficulty, weakness and light-headedness, dizziness, tremors, sleepiness  Psychiatric/Behavioral: Negative for suicidal ideas, hallucinations, behavioral problems, self-injury, decreased concentration/cognition, agitation, confusion. PHYSICAL EXAM:   Nursing note and vitals reviewed. /81   Pulse 79   Wt 194 lb (88 kg)   BMI 27.84 kg/m²   General Appearance: Patient is well nourished, well developed, well groomed and in no acute distress. Skin: Skin is warm and dry, good turgor . No rash or lesions noted. He is not diaphoretic. Pulmonary/Chest: Effort normal. No respiratory distress or use of accessory muscles. Auscultation revealing normal air entry. He does not have wheezes in the lung fields. He has no rales. Cardiovascular: Normal rate, regular rhythm, normal heart sounds, and does not have murmur. Exam reveals no gallop and no friction rub. Musculoskeletal / Extremities: Range of motion is normal. Gait is normal, assistive devices use: none. He exhibits edema: none, and no tenderness. Neurological/Psychiatric:He is alert and oriented to person, place, and time. Coordination is  normal.   Judgement and Insight is normal  His mood is Appropriate and affect is Anxious .  His behavior is normal.   thought content normal.        IMPRESSION:     1. [facility] Low back sprain, initial encounter 2. BWC-Prolapsed lumbosacral intervertebral disc    3. BWC Lumbar sprain, initial encounter    4. BWC Sprain of low back, initial encounter    5. BWC Sprain of lumbar region, initial encounter        PLAN:  Informed verbal consent was obtained. -Neck postural exercises given    -Continue with Ms Contin along with Norco for btp  -Continue with Mobic   -Start Zanaflex 4 mg BID for 7-10 days and Medrol pack  -If symptoms do not resolve will try TPI  -CBT techniques- relaxation therapies such as biofeedback, mindfulness based stress reduction, imagery, cognitive restructuring, problem solving discussed with patient   -Continue with Cymbalta 60 mg   -he was advised proper sleep hygiene-told to avoid:use of caffeine or other stimulants after noon, alcohol use near bedtime, long or frequent naps during the day, erratic sleep schedule, heavy meals near bedtime, vigorous exercise near bedtime and use of electronic devices near bedtime   -Continue with Seroquel 25 mg 1-2 nightly   -Continue with Neurontin same dose  Mr. Rosenda Layne will be prescribed  the medications  listed below which are for treatment of his presenting  medical problems which for this visit include:   Diagnoses of BWC Low back sprain, initial encounter, BWC-Prolapsed lumbosacral intervertebral disc, BWC Lumbar sprain, initial encounter, BWC Sprain of low back, initial encounter, and BWC Sprain of lumbar region, initial encounter were pertinent to this visit. Medications/orders associated with this visit:    Current Outpatient Medications   Medication Sig Dispense Refill    HYDROcodone-acetaminophen (NORCO) 7.5-325 MG per tablet Take 1 tablet by mouth every 6 hours as needed for Pain (max 3 per day) for up to 28 days. 84 tablet 0    morphine (MS CONTIN) 30 MG extended release tablet Take 1 tablet by mouth daily for 28 days.  28 tablet 0    QUEtiapine (SEROQUEL) 25 MG tablet Take 1-2 tablets by mouth nightly 60 tablet 1    DULoxetine (CYMBALTA) 60 MG extended release capsule Take 1 capsule by mouth in the morning. 30 capsule 1    meloxicam (MOBIC) 15 MG tablet Take 1 tablet by mouth daily as needed for Pain 30 tablet 1    phenazopyridine (PYRIDIUM) 100 MG tablet Take 1 tablet by mouth 3 times daily as needed for Pain 20 tablet 0    NOVOLOG 100 UNIT/ML injection continuous. Insulin pump averages 50-80 units daily      aspirin 325 MG tablet Take 325 mg by mouth daily. carvedilol (COREG) 6.25 MG tablet Take 6.25 mg by mouth 2 times daily (with meals). atorvastatin (LIPITOR) 80 MG tablet Take 80 mg by mouth nightly. clopidogrel (PLAVIX) 75 MG tablet Take 75 mg by mouth daily. lisinopril (PRINIVIL;ZESTRIL) 10 MG tablet Take 10 mg by mouth daily. gabapentin (NEURONTIN) 400 MG capsule Take 1 capsule by mouth in the morning and 1 capsule at noon and 1 capsule before bedtime. Do all this for 30 days. 90 capsule 1    magnesium oxide (MAG-OX) 400 MG tablet Take 1 tablet by mouth in the morning and 1 tablet before bedtime. 60 tablet 1     No current facility-administered medications for this visit. Goals of current treatment regimen include improvement in pain, restoration of functioning- with focus on improvement in physical performance, general activity, work or disability,emotional distress, health care utilization and  decreased medication consumption. Will continue to monitor progress towards achieving/maintaining therapeutic goals with special emphasis on  1. Improvement in perceived interfernce  of pain with ADL's. Ability to do home exercises independently. Ability to do household chores indoor and/or outdoor work and social and leisure activities. To increase flexibility/ROM, strength and endurance. Improve psychosocial and physical functioning.- he is not showing any significant progress/or showing regression  towards this goal and reassessment and adjustment of goals/treatment have been made. 2. Improving sleep to 6-7 hours a night. Improve mood/ anxiety and depression symptoms such as crying spells, low energy, problems with concentration, motivation.- he is showing progression towards this treatment goal with the current regimen. 3. Reduction of reliance on opioid analgesia/more appropriate opioid use. - he is showing progression towards this treatment goal with the current regimen. Risks and benefits of the medications and other alternative treatments have been/were  discussed with the patient. Any questions on the  common side effects of these medications were also answered. He was advised against drinking alcohol with the narcotic pain medicines, advised against driving or handling machinery when  starting or adjusting the dose of medicines, feeling groggy or drowsy, or if having any cognitive issues related to the current medications. Heis fully aware of the risk of overdose and death, if medicines are misused and not taken as prescribed. If he develops new symptoms or if the symptoms worsen, he was told to call the office. .  Thank you for allowing me to participate in the care of this patient.     Lu Sutherland MD    Cc: Tasha Rae MD

## 2022-11-17 ENCOUNTER — PATIENT MESSAGE (OUTPATIENT)
Dept: PAIN MANAGEMENT | Age: 57
End: 2022-11-17

## 2022-11-17 DIAGNOSIS — M51.27 PROLAPSED LUMBOSACRAL INTERVERTEBRAL DISC: ICD-10-CM

## 2022-11-17 DIAGNOSIS — S33.5XXA SPRAIN OF LUMBAR REGION, INITIAL ENCOUNTER: ICD-10-CM

## 2022-11-17 DIAGNOSIS — S33.5XXA SPRAIN OF LOW BACK, INITIAL ENCOUNTER: ICD-10-CM

## 2022-11-17 DIAGNOSIS — S33.5XXA LUMBAR SPRAIN, INITIAL ENCOUNTER: ICD-10-CM

## 2022-11-17 DIAGNOSIS — S33.5XXA LOW BACK SPRAIN, INITIAL ENCOUNTER: ICD-10-CM

## 2022-11-23 RX ORDER — METHYLPREDNISOLONE 4 MG/1
TABLET ORAL
Qty: 1 KIT | Refills: 0 | Status: SHIPPED | OUTPATIENT
Start: 2022-11-23

## 2022-11-23 NOTE — TELEPHONE ENCOUNTER
From: Vibha Mora  To: Dr. Alexa Casillas: 11/17/2022 2:05 PM EST  Subject: Possible Refill? Dr Duane Birkenhead supplied me with a steroid pack and some better anti inflammatory at my last visit for some other pain related issues I was having. I am wondering if he would consider another 10 day supply of the anti inflammatory. The 2 scripts he gave me did a good job of reducing the pain however I am still having lingering muscle pains that need just a little more push to get things back to normal. Please let me know one way or the other.   Thank you

## 2022-12-03 ENCOUNTER — OFFICE VISIT (OUTPATIENT)
Dept: PAIN MANAGEMENT | Age: 57
End: 2022-12-03

## 2022-12-03 VITALS
SYSTOLIC BLOOD PRESSURE: 137 MMHG | HEART RATE: 76 BPM | DIASTOLIC BLOOD PRESSURE: 75 MMHG | BODY MASS INDEX: 27.41 KG/M2 | WEIGHT: 191 LBS

## 2022-12-03 DIAGNOSIS — M51.27 PROLAPSED LUMBOSACRAL INTERVERTEBRAL DISC: ICD-10-CM

## 2022-12-03 DIAGNOSIS — S33.5XXA SPRAIN OF LUMBAR REGION, INITIAL ENCOUNTER: ICD-10-CM

## 2022-12-03 DIAGNOSIS — S33.5XXA LOW BACK SPRAIN, INITIAL ENCOUNTER: ICD-10-CM

## 2022-12-03 DIAGNOSIS — S33.5XXA LUMBAR SPRAIN, INITIAL ENCOUNTER: ICD-10-CM

## 2022-12-03 DIAGNOSIS — S33.5XXA SPRAIN OF LOW BACK, INITIAL ENCOUNTER: ICD-10-CM

## 2022-12-03 RX ORDER — HYDROCODONE BITARTRATE AND ACETAMINOPHEN 7.5; 325 MG/1; MG/1
1 TABLET ORAL EVERY 6 HOURS PRN
Qty: 84 TABLET | Refills: 0 | Status: SHIPPED | OUTPATIENT
Start: 2022-12-03 | End: 2022-12-31

## 2022-12-03 RX ORDER — MORPHINE SULFATE 30 MG/1
30 TABLET, FILM COATED, EXTENDED RELEASE ORAL DAILY
Qty: 28 TABLET | Refills: 0 | Status: SHIPPED | OUTPATIENT
Start: 2022-12-03 | End: 2022-12-31

## 2022-12-03 NOTE — PROGRESS NOTES
Lyudmila Declan  1965  [id number]      HISTORY OF PRESENT ILLNESS:  Mr. Shayna Mcnulty is a 59 y.o. male returns for a follow up visit for pain management  He has a diagnosis of   1. BWC Low back sprain, initial encounter    2. BWC Lumbar sprain, initial encounter    3. BWC Sprain of lumbar region, initial encounter    4. BWC-Prolapsed lumbosacral intervertebral disc    5. BWC Sprain of low back, initial encounter    . He complains of pain in the  Low back, left knee   He rates the pain 7/10 and describes it as sharp, aching, burning. Current treatment regimen has helped relieve about 50% of the pain. He denies any side effects from the current pain regimen. Patient reports that since the last follow up visit the physical functioning is unchanged, family/social relationships are unchanged, mood is unchanged sleep patterns are worse, and that the overall functioning is unchanged. Patient denies misusing/abusing his narcotic pain medications or using any illegal drugs. There are No indicators for possible drug abuse, addiction or diversion problems. Patient reports he has been doing fair, upper back pain has been somewhat better. He says he is using Ms. Contin with Norco for btp. He denies any constipation symptoms. He states he is working full time. ALLERGIES: Patients list of allergies were reviewed     MEDICATIONS: Mr. Shayna Mcnulty list of medications were reviewed. His current medications are   Outpatient Medications Prior to Visit   Medication Sig Dispense Refill    HYDROcodone-acetaminophen (NORCO) 7.5-325 MG per tablet Take 1 tablet by mouth every 6 hours as needed for Pain (max 3 per day) for up to 30 days. 90 tablet 0    morphine (MS CONTIN) 30 MG extended release tablet Take 1 tablet by mouth daily for 30 days. 30 tablet 0    QUEtiapine (SEROQUEL) 25 MG tablet Take 1-2 tablets by mouth nightly 60 tablet 1    DULoxetine (CYMBALTA) 60 MG extended release capsule Take 1 capsule by mouth in the morning.  30 capsule 1    meloxicam (MOBIC) 15 MG tablet Take 1 tablet by mouth daily as needed for Pain 30 tablet 1    phenazopyridine (PYRIDIUM) 100 MG tablet Take 1 tablet by mouth 3 times daily as needed for Pain 20 tablet 0    NOVOLOG 100 UNIT/ML injection continuous. Insulin pump averages 50-80 units daily      aspirin 325 MG tablet Take 325 mg by mouth daily. carvedilol (COREG) 6.25 MG tablet Take 6.25 mg by mouth 2 times daily (with meals). atorvastatin (LIPITOR) 80 MG tablet Take 80 mg by mouth nightly. clopidogrel (PLAVIX) 75 MG tablet Take 75 mg by mouth daily. lisinopril (PRINIVIL;ZESTRIL) 10 MG tablet Take 10 mg by mouth daily. methylPREDNISolone (MEDROL, JAVAD,) 4 MG tablet Use as directed 1 kit 0    tiZANidine (ZANAFLEX) 4 MG tablet Take 1 tablet by mouth in the morning and at bedtime 15 tablet 0    gabapentin (NEURONTIN) 400 MG capsule Take 1 capsule by mouth in the morning and 1 capsule at noon and 1 capsule before bedtime. Do all this for 30 days. 90 capsule 1    magnesium oxide (MAG-OX) 400 MG tablet Take 1 tablet by mouth in the morning and 1 tablet before bedtime. 60 tablet 1     No facility-administered medications prior to visit. REVIEW OF SYSTEMS:    Respiratory: Negative for apnea, chest tightness and shortness of breath or change in baseline breathing. PHYSICAL EXAM:   Nursing note and vitals reviewed. /75   Pulse 76   Wt 191 lb (86.6 kg)   BMI 27.41 kg/m²   Constitutional: He appears well-developed and well-nourished. No acute distress. Cardiovascular: Normal rate, regular rhythm, normal heart sounds, and does not have murmur. Pulmonary/Chest: Effort normal. No respiratory distress. He does not have wheezes in the lung fields. He has no rales. Neurological/Psychiatric:He is alert and oriented to person, place, and time. Coordination is  normal.  His mood isAppropriate and affect is Neutral/Euthymic(normal) .    IMPRESSION:   1. [facility] Low back sprain, initial encounter    2. BWC Lumbar sprain, initial encounter    3. BWC Sprain of lumbar region, initial encounter    4. BWC-Prolapsed lumbosacral intervertebral disc    5. BWC Sprain of low back, initial encounter        PLAN:  Informed verbal consent was obtained  -ROM/stretching exercises as advised   -He was advised to increase fluids ( 5-7  glasses of fluid daily), limit caffeine, avoid cheese products, increase dietary fiber, increase activity and exercise as tolerated and relax regularly and enjoy meals   -Continue with Ms. Contin with Norco 3 per day for btp.   -Postural exercises given   -Walking as tolerated    Current Outpatient Medications   Medication Sig Dispense Refill    HYDROcodone-acetaminophen (NORCO) 7.5-325 MG per tablet Take 1 tablet by mouth every 6 hours as needed for Pain (max 3 per day) for up to 30 days. 90 tablet 0    morphine (MS CONTIN) 30 MG extended release tablet Take 1 tablet by mouth daily for 30 days. 30 tablet 0    QUEtiapine (SEROQUEL) 25 MG tablet Take 1-2 tablets by mouth nightly 60 tablet 1    DULoxetine (CYMBALTA) 60 MG extended release capsule Take 1 capsule by mouth in the morning. 30 capsule 1    meloxicam (MOBIC) 15 MG tablet Take 1 tablet by mouth daily as needed for Pain 30 tablet 1    phenazopyridine (PYRIDIUM) 100 MG tablet Take 1 tablet by mouth 3 times daily as needed for Pain 20 tablet 0    NOVOLOG 100 UNIT/ML injection continuous. Insulin pump averages 50-80 units daily      aspirin 325 MG tablet Take 325 mg by mouth daily. carvedilol (COREG) 6.25 MG tablet Take 6.25 mg by mouth 2 times daily (with meals). atorvastatin (LIPITOR) 80 MG tablet Take 80 mg by mouth nightly. clopidogrel (PLAVIX) 75 MG tablet Take 75 mg by mouth daily. lisinopril (PRINIVIL;ZESTRIL) 10 MG tablet Take 10 mg by mouth daily. gabapentin (NEURONTIN) 400 MG capsule Take 1 capsule by mouth in the morning and 1 capsule at noon and 1 capsule before bedtime.  Do all this for 30 days. 90 capsule 1    magnesium oxide (MAG-OX) 400 MG tablet Take 1 tablet by mouth in the morning and 1 tablet before bedtime. 60 tablet 1     No current facility-administered medications for this visit. I will continue his current medication regimen  which is part of the above treatment schedule. It has been helping with Mr. Shahid Keen chronic  medical problems which for this visit include:   Diagnoses of BWC Low back sprain, initial encounter, BWC Lumbar sprain, initial encounter, BWC Sprain of lumbar region, initial encounter, BWC-Prolapsed lumbosacral intervertebral disc, and BWC Sprain of low back, initial encounter were pertinent to this visit. Risks and benefits of the medications and other alternative treatments  including no treatment were discussed with the patient. The common side effects of these medications were also explained to the patient. Informed verbal consent was obtained. Goals of current treatment regimen include improvement in pain, restoration of functioning- with focus on improvement in physical performance, general activity, work or disability,emotional distress, health care utilization and  decreased medication consumption. Will continue to monitor progress towards achieving/maintaining therapeutic goals with special emphasis on  1. Improvement in perceived interfernce  of pain with ADL's. Ability to do home exercises independently. Ability to do household chores indoor and/or outdoor work and social and leisure activities. Improve psychosocial and physical functioning. - he is showing progression towards this treatment goal with the current regimen. 2.Ability to focus/concentrate at work and perform the duties required of him at work  Sit through a workday without lower extremity symptoms. Stand 30-60 minutes without lower extremity symptoms. Ability to lift up to 10-20 lbs. Ability to go up and down stairs. Sit 30-60 minutes  Without having to stand up frequently.  - he is maintaining/progressing towards his work related goals with the current regimen. He was advised against drinking alcohol with the narcotic pain medicines, advised against driving or handling machinery while adjusting the dose of medicines or if having cognitive  issues related to the current medications. Risk of overdose and death, if medicines not taken as prescribed, were also discussed. If the patient develops new symptoms or if the symptoms worsen, the patient should call the office. While transcribing every attempt was made to maintain the accuracy of the note in terms of it's contents,there may have been some errors made inadvertently. Thank you for allowing me to participate in the care of this patient.     Kath Mark MD.    Cc: Felipe Nielsen MD

## 2022-12-30 ENCOUNTER — OFFICE VISIT (OUTPATIENT)
Dept: PAIN MANAGEMENT | Age: 57
End: 2022-12-30

## 2022-12-30 VITALS
BODY MASS INDEX: 28.84 KG/M2 | WEIGHT: 201 LBS | DIASTOLIC BLOOD PRESSURE: 77 MMHG | HEART RATE: 58 BPM | SYSTOLIC BLOOD PRESSURE: 152 MMHG

## 2022-12-30 DIAGNOSIS — S33.5XXA SPRAIN OF LOW BACK, INITIAL ENCOUNTER: ICD-10-CM

## 2022-12-30 DIAGNOSIS — S33.5XXA LUMBAR SPRAIN, INITIAL ENCOUNTER: ICD-10-CM

## 2022-12-30 DIAGNOSIS — M51.27 PROLAPSED LUMBOSACRAL INTERVERTEBRAL DISC: ICD-10-CM

## 2022-12-30 DIAGNOSIS — S33.5XXA SPRAIN OF LUMBAR REGION, INITIAL ENCOUNTER: ICD-10-CM

## 2022-12-30 DIAGNOSIS — S33.5XXA LOW BACK SPRAIN, INITIAL ENCOUNTER: ICD-10-CM

## 2022-12-30 RX ORDER — MORPHINE SULFATE 30 MG/1
30 TABLET, FILM COATED, EXTENDED RELEASE ORAL DAILY
Qty: 28 TABLET | Refills: 0 | Status: SHIPPED | OUTPATIENT
Start: 2022-12-30 | End: 2023-01-27

## 2022-12-30 RX ORDER — HYDROCODONE BITARTRATE AND ACETAMINOPHEN 7.5; 325 MG/1; MG/1
1 TABLET ORAL EVERY 6 HOURS PRN
Qty: 84 TABLET | Refills: 0 | Status: SHIPPED | OUTPATIENT
Start: 2022-12-30 | End: 2023-01-27

## 2022-12-30 NOTE — PROGRESS NOTES
Master [place]ton  1965  [id number]    HISTORY OF PRESENT ILLNESS:  Mr. Ignacio Murrieta is a 59 y.o. male returns for a follow up visit for multiple medical problems. His  presenting problems are   1. BWC Low back sprain, initial encounter    2. BWC Lumbar sprain, initial encounter    3. BWC Sprain of lumbar region, initial encounter    4. BWC-Prolapsed lumbosacral intervertebral disc    5. BWC Sprain of low back, initial encounter    . As per information/history obtained from the PADT(patient assessment and documentation tool) -  He complains of pain in the lower back with radiation to the buttocks, hips Left, upper leg Left, and knees Left He rates the pain 7/10 and describes it as sharp, aching, burning. Pain is made worse by: movement, walking, standing, sitting, bending, lifting. Current treatment regimen has helped relieve about 50% of the pain. He denies side effects from the current pain regimen. Patient reports that since the last follow up visit the physical functioning is worse, family/social relationships are unchanged, mood is unchanged and sleep patterns are unchanged, and that the overall functioning is worse. Patient denies neurological bowel or bladder. Patient denies misusing/abusing his narcotic pain medications or using any illegal drugs. There are No indicators for possible drug abuse, addiction or diversion problems. Upon obtaining the medical history from Mr. Ignacio Murrieta regarding today's office visit for his presenting problems, Patient states she has been doing fair, pain has been manageable some goes up and down. She says she is using Ms. Contin with [facility] for bpt . She denies any constipation symptoms. Patient states his sleep is fair. Has fairly normal sleep latency. Averages about 4-6 hours of sleep a night. Denies any signs of sleep apnea. Feels somewhat rested in the morning. He says he is using Ms. Contin with [facility] for btp. Patient states his sleep is fair. Has fairly normal sleep latency. Averages about 4-6 hours of sleep a night. Denies any signs of sleep apnea. Feels somewhat rested in the morning. He says she is using Seroquel. Patient's  subjective report of his mood is fair. he describes occasional symptoms of depression, occasional  irritability and some mood swings. Describes his mood as being neutral and reports some pleasure in his daily activities. Reports  fair  appetite, energy and concentration. Able to function well in different aspects of his daily activities. Denies suicidal or homicidal ideation. Denies any complaints of increased tension, does   Worry sometimes and occasional  irritability  he denies any c/o increased anxiety, No c/o panic attacks or symptoms of PTSD. He mentions he is on Cymbalta 60 mg. He reports he is using Neurontin 1200 mg. ALLERGIES/PAST MED/FAM/SOC HISTORY: Mr. Troy Monreal allergies, past medical, family and social history were reviewed in the chart. Mr. Troy Monreal current medications are   Outpatient Medications Prior to Visit   Medication Sig Dispense Refill    QUEtiapine (SEROQUEL) 25 MG tablet Take 1-2 tablets by mouth nightly 60 tablet 1    DULoxetine (CYMBALTA) 60 MG extended release capsule Take 1 capsule by mouth in the morning. 30 capsule 1    meloxicam (MOBIC) 15 MG tablet Take 1 tablet by mouth daily as needed for Pain 30 tablet 1    phenazopyridine (PYRIDIUM) 100 MG tablet Take 1 tablet by mouth 3 times daily as needed for Pain 20 tablet 0    NOVOLOG 100 UNIT/ML injection continuous. Insulin pump averages 50-80 units daily      aspirin 325 MG tablet Take 325 mg by mouth daily. carvedilol (COREG) 6.25 MG tablet Take 6.25 mg by mouth 2 times daily (with meals). atorvastatin (LIPITOR) 80 MG tablet Take 80 mg by mouth nightly. clopidogrel (PLAVIX) 75 MG tablet Take 75 mg by mouth daily. lisinopril (PRINIVIL;ZESTRIL) 10 MG tablet Take 10 mg by mouth daily.       HYDROcodone-acetaminophen (NORCO) 7.5-325 MG per tablet Take 1 tablet by mouth every 6 hours as needed for Pain (max 3 per day) for up to 28 days. 84 tablet 0    morphine (MS CONTIN) 30 MG extended release tablet Take 1 tablet by mouth daily for 28 days. 28 tablet 0    gabapentin (NEURONTIN) 400 MG capsule Take 1 capsule by mouth in the morning and 1 capsule at noon and 1 capsule before bedtime. Do all this for 30 days. 90 capsule 1    magnesium oxide (MAG-OX) 400 MG tablet Take 1 tablet by mouth in the morning and 1 tablet before bedtime. 60 tablet 1     No facility-administered medications prior to visit. REVIEW OF SYSTEMS: .   Respiratory: Negative for shortness of breath. Cardiovascular: Negative for chest pain, palpitations  Gastrointestinal: Negative for blood in stool, abdominal distention, nausea, vomiting, abdominal pain, diarrhea,constipation. Neurological: Negative for speech difficulty, weakness and light-headedness, dizziness, tremors, sleepiness  Psychiatric/Behavioral: Negative for suicidal ideas, hallucinations, behavioral problems, self-injury, decreased concentration/cognition, agitation, confusion. PHYSICAL EXAM:   Nursing note and vitals reviewed. BP (!) 152/77   Pulse 58   Wt 201 lb (91.2 kg)   BMI 28.84 kg/m²   General Appearance: Patient is well nourished, well developed, well groomed and in no acute distress. Skin: Skin is warm and dry, good turgor . No rash or lesions noted. He is not diaphoretic. Pulmonary/Chest: Effort normal. No respiratory distress or use of accessory muscles. Auscultation revealing normal air entry. He does not have wheezes in the lung fields. He has no rales. Cardiovascular: Normal rate, regular rhythm, normal heart sounds, and does not have murmur. Exam reveals no gallop and no friction rub. Musculoskeletal / Extremities: Range of motion is normal. Gait is normal, assistive devices use: none. He exhibits edema: none, and no tenderness.    Neurological/Psychiatric:He is alert and oriented to person, place, and time. Coordination is  normal.   Judgement and Insight is normal  His mood is Appropriate and affect is Neutral/Euthymic(normal) . His behavior is normal.   thought content normal.        IMPRESSION:     1. BWC Low back sprain, initial encounter    2. BWC Lumbar sprain, initial encounter    3. BWC Sprain of lumbar region, initial encounter    4. BWC-Prolapsed lumbosacral intervertebral disc    5. BWC Sprain of low back, initial encounter        PLAN:  Informed verbal consent was obtained. -ROM/Stretching exercises as advised   -He was advised to increase fluids ( 5-7  glasses of fluid daily), limit caffeine, avoid cheese products, increase dietary fiber, increase activity and exercise as tolerated and relax regularly and enjoy meals   -he was advised proper sleep hygiene-told to avoid:use of caffeine or other stimulants after noon, alcohol use near bedtime, long or frequent naps during the day, erratic sleep schedule, heavy meals near bedtime, vigorous exercise near bedtime and use of electronic devices near bedtime   -Continue with Seroquel   -Continue with Ms. Contin with [facility] for btp   -CBT techniques- relaxation therapies such as biofeedback, mindfulness based stress reduction, imagery, cognitive restructuring, problem solving discussed with patient   -Continue with Cymbalta 60 mg   -Urine drug screen with GC/MS for opiates and drugs of abuse was ordered and will follow up on results. Mr. Ananth Cruz will be prescribed  the medications  listed below which are for treatment of his presenting  medical problems which for this visit include:   Diagnoses of BWC Low back sprain, initial encounter, BWC Lumbar sprain, initial encounter, BWC Sprain of lumbar region, initial encounter, BWC-Prolapsed lumbosacral intervertebral disc, and BWC Sprain of low back, initial encounter were pertinent to this visit.   Medications/orders associated with this visit:    Current Outpatient Medications   Medication Sig Dispense Refill Ability to do household chores indoor and/or outdoor work and social and leisure activities. To increase flexibility/ROM, strength and endurance. Improve psychosocial and physical functioning.- he is showing progression towards this treatment goal with the current regimen. 2. Improving sleep to 6-7 hours a night. Improve mood/ anxiety and depression symptoms such as crying spells, low energy, problems with concentration, motivation.- he is showing progression towards this treatment goal with the current regimen. 3. Reduction of reliance on opioid analgesia/more appropriate opioid use. - he is showing progression towards this treatment goal with the current regimen. Risks and benefits of the medications and other alternative treatments have been/were  discussed with the patient. Any questions on the  common side effects of these medications were also answered. He was advised against drinking alcohol with the narcotic pain medicines, advised against driving or handling machinery when  starting or adjusting the dose of medicines, feeling groggy or drowsy, or if having any cognitive issues related to the current medications. Heis fully aware of the risk of overdose and death, if medicines are misused and not taken as prescribed. If he develops new symptoms or if the symptoms worsen, he was told to call the office. .  Thank you for allowing me to participate in the care of this patient.     Janyth Boeck, MD    Cc: Danielle Vaughn MD

## 2023-01-19 ENCOUNTER — PATIENT MESSAGE (OUTPATIENT)
Dept: PAIN MANAGEMENT | Age: 58
End: 2023-01-19

## 2023-01-20 DIAGNOSIS — S33.5XXA SPRAIN OF LUMBAR REGION, INITIAL ENCOUNTER: ICD-10-CM

## 2023-01-20 DIAGNOSIS — M51.27 PROLAPSED LUMBOSACRAL INTERVERTEBRAL DISC: ICD-10-CM

## 2023-01-20 DIAGNOSIS — S33.5XXA SPRAIN OF LOW BACK, INITIAL ENCOUNTER: ICD-10-CM

## 2023-01-20 DIAGNOSIS — S33.5XXA LUMBAR SPRAIN, INITIAL ENCOUNTER: ICD-10-CM

## 2023-01-20 DIAGNOSIS — S33.5XXA LOW BACK SPRAIN, INITIAL ENCOUNTER: ICD-10-CM

## 2023-01-20 RX ORDER — MORPHINE SULFATE 30 MG/1
30 TABLET, FILM COATED, EXTENDED RELEASE ORAL DAILY
Qty: 5 TABLET | Refills: 0 | Status: SHIPPED | OUTPATIENT
Start: 2023-01-20 | End: 2023-01-27 | Stop reason: SDUPTHER

## 2023-01-20 NOTE — TELEPHONE ENCOUNTER
From: Bon Doherty  To: Dr. Neeta Knight: 1/19/2023 7:55 AM EST  Subject: Morphine Shortage    Hi  I spoke to one of Dr Naa Keys assistants this past Friday and she mentioned she was going to speak to him about the prescription shortage from my Pharmacy from my last visit. Sorry to be a pain but has he made a decision on what to do about the shortage of 5 pills? Sunday will be the last of the script before my visit on the 27th.   Thank you

## 2023-01-27 ENCOUNTER — OFFICE VISIT (OUTPATIENT)
Dept: PAIN MANAGEMENT | Age: 58
End: 2023-01-27
Payer: COMMERCIAL

## 2023-01-27 VITALS
DIASTOLIC BLOOD PRESSURE: 66 MMHG | BODY MASS INDEX: 28.7 KG/M2 | HEART RATE: 70 BPM | WEIGHT: 200 LBS | SYSTOLIC BLOOD PRESSURE: 121 MMHG

## 2023-01-27 DIAGNOSIS — S33.5XXA LUMBAR SPRAIN, INITIAL ENCOUNTER: ICD-10-CM

## 2023-01-27 DIAGNOSIS — S33.5XXA LOW BACK SPRAIN, INITIAL ENCOUNTER: ICD-10-CM

## 2023-01-27 DIAGNOSIS — S33.5XXA SPRAIN OF LUMBAR REGION, INITIAL ENCOUNTER: ICD-10-CM

## 2023-01-27 DIAGNOSIS — M51.27 PROLAPSED LUMBOSACRAL INTERVERTEBRAL DISC: ICD-10-CM

## 2023-01-27 DIAGNOSIS — S33.5XXA SPRAIN OF LOW BACK, INITIAL ENCOUNTER: ICD-10-CM

## 2023-01-27 PROCEDURE — 99213 OFFICE O/P EST LOW 20 MIN: CPT | Performed by: INTERNAL MEDICINE

## 2023-01-27 PROCEDURE — 3074F SYST BP LT 130 MM HG: CPT | Performed by: INTERNAL MEDICINE

## 2023-01-27 PROCEDURE — 3078F DIAST BP <80 MM HG: CPT | Performed by: INTERNAL MEDICINE

## 2023-01-27 RX ORDER — MAGNESIUM OXIDE 400 MG/1
400 TABLET ORAL 2 TIMES DAILY
Qty: 60 TABLET | Refills: 1 | Status: SHIPPED | OUTPATIENT
Start: 2023-01-27

## 2023-01-27 RX ORDER — DULOXETIN HYDROCHLORIDE 60 MG/1
60 CAPSULE, DELAYED RELEASE ORAL DAILY
Qty: 30 CAPSULE | Refills: 1 | Status: SHIPPED | OUTPATIENT
Start: 2023-01-27

## 2023-01-27 RX ORDER — QUETIAPINE FUMARATE 25 MG/1
25-50 TABLET, FILM COATED ORAL NIGHTLY
Qty: 60 TABLET | Refills: 1 | Status: SHIPPED | OUTPATIENT
Start: 2023-01-27

## 2023-01-27 RX ORDER — GABAPENTIN 400 MG/1
400 CAPSULE ORAL 3 TIMES DAILY
Qty: 90 CAPSULE | Refills: 1 | Status: SHIPPED | OUTPATIENT
Start: 2023-01-27 | End: 2023-02-26

## 2023-01-27 RX ORDER — MELOXICAM 15 MG/1
15 TABLET ORAL DAILY PRN
Qty: 30 TABLET | Refills: 1 | Status: SHIPPED | OUTPATIENT
Start: 2023-01-27

## 2023-01-27 RX ORDER — MORPHINE SULFATE 30 MG/1
30 TABLET, FILM COATED, EXTENDED RELEASE ORAL DAILY
Qty: 28 TABLET | Refills: 0 | Status: SHIPPED | OUTPATIENT
Start: 2023-01-27 | End: 2023-02-24

## 2023-01-27 RX ORDER — HYDROCODONE BITARTRATE AND ACETAMINOPHEN 7.5; 325 MG/1; MG/1
1 TABLET ORAL EVERY 6 HOURS PRN
Qty: 84 TABLET | Refills: 0 | Status: SHIPPED | OUTPATIENT
Start: 2023-01-27 | End: 2023-02-24

## 2023-01-27 NOTE — PROGRESS NOTES
Dea Ramirez  1965  [id number]      HISTORY OF PRESENT ILLNESS:  Mr. Amauri Nielsen is a 59 y.o. male returns for a follow up visit for pain management  He has a diagnosis of   1. BWC Low back sprain, initial encounter    2. BWC Lumbar sprain, initial encounter    3. BWC Sprain of lumbar region, initial encounter    4. BWC-Prolapsed lumbosacral intervertebral disc    5. BWC Sprain of low back, initial encounter    . He complains of pain in the  low back, left upper leg, left knee   He rates the pain 7/10 and describes it as sharp, aching, burning, numbness, pins and needles. Current treatment regimen has helped relieve about 50% of the pain. He denies any side effects from the current pain regimen. Patient reports that since the last follow up visit the physical functioning is worse, family/social relationships are unchanged, mood is unchanged sleep patterns are unchanged, and that the overall functioning is worse. Patient denies misusing/abusing his narcotic pain medications or using any illegal drugs. There are No indicators for possible drug abuse, addiction or diversion problems. Patient states he has been having increase pain. He complains of increased pain with changes in weather. Extreme temperatures- cold and damp weather causes increased pain. He says he is using Ms. Contin with [facility]co 3 per day. He denies any constipation symptoms. He reports he is using all the other adjuvants. He states he is working full time. ALLERGIES: Patients list of allergies were reviewed     MEDICATIONS: Mr. Amauri Nielsen list of medications were reviewed. His current medications are   Outpatient Medications Prior to Visit   Medication Sig Dispense Refill    phenazopyridine (PYRIDIUM) 100 MG tablet Take 1 tablet by mouth 3 times daily as needed for Pain 20 tablet 0    NOVOLOG 100 UNIT/ML injection continuous. Insulin pump averages 50-80 units daily      aspirin 325 MG tablet Take 325 mg by mouth daily.       carvedilol (COREG) 6.25 MG tablet Take 6.25 mg by mouth 2 times daily (with meals). atorvastatin (LIPITOR) 80 MG tablet Take 80 mg by mouth nightly. clopidogrel (PLAVIX) 75 MG tablet Take 75 mg by mouth daily. lisinopril (PRINIVIL;ZESTRIL) 10 MG tablet Take 10 mg by mouth daily. HYDROcodone-acetaminophen (NORCO) 7.5-325 MG per tablet Take 1 tablet by mouth every 6 hours as needed for Pain (max 3 per day) for up to 28 days. 84 tablet 0    QUEtiapine (SEROQUEL) 25 MG tablet Take 1-2 tablets by mouth nightly 60 tablet 1    gabapentin (NEURONTIN) 400 MG capsule Take 1 capsule by mouth in the morning and 1 capsule at noon and 1 capsule before bedtime. Do all this for 30 days. 90 capsule 1    DULoxetine (CYMBALTA) 60 MG extended release capsule Take 1 capsule by mouth in the morning. 30 capsule 1    meloxicam (MOBIC) 15 MG tablet Take 1 tablet by mouth daily as needed for Pain 30 tablet 1    magnesium oxide (MAG-OX) 400 MG tablet Take 1 tablet by mouth in the morning and 1 tablet before bedtime. 60 tablet 1     No facility-administered medications prior to visit. REVIEW OF SYSTEMS:    Respiratory: Negative for apnea, chest tightness and shortness of breath or change in baseline breathing. PHYSICAL EXAM:   Nursing note and vitals reviewed. /66   Pulse 70   Wt 200 lb (90.7 kg)   BMI 28.70 kg/m²   Constitutional: He appears well-developed and well-nourished. No acute distress. Cardiovascular: Normal rate, regular rhythm, normal heart sounds, and does not have murmur. Pulmonary/Chest: Effort normal. No respiratory distress. He does not have wheezes in the lung fields. He has no rales. Neurological/Psychiatric:He is alert and oriented to person, place, and time. Coordination is  normal.  His mood isAppropriate and affect is Neutral/Euthymic(normal) . IMPRESSION:   1. BWC Low back sprain, initial encounter    2. BWC Lumbar sprain, initial encounter    3.  C Sprain of lumbar region, initial encounter    4. BWC-Prolapsed lumbosacral intervertebral disc    5. BWC Sprain of low back, initial encounter        PLAN:  Informed verbal consent was obtained  -[place] record was obtained and reviewed  for the last one year and no indicators of drug misuse  were found. Any other controlled substance prescriptions  seen on the record have been accounted for, I am aware of the patient receiving these medications. Maren Pérez [place] record will be rechecked as part of office protocol.    -Patient's urine drug screen results with GC/MS confirmation were obtained and reviewed and were negative for any illicit drugs. Prescribed medications were within acceptable range. -ROM/Stretching exercises as advised   -He was advised to increase fluids ( 5-7  glasses of fluid daily), limit caffeine, avoid cheese products, increase dietary fiber, increase activity and exercise as tolerated and relax regularly and enjoy meals   -Continue with Ms. Contin with Norco for the BTP. -Walking as tolerated    Current Outpatient Medications   Medication Sig Dispense Refill    HYDROcodone-acetaminophen (NORCO) 7.5-325 MG per tablet Take 1 tablet by mouth every 6 hours as needed for Pain (max 3 per day) for up to 28 days. 84 tablet 0    QUEtiapine (SEROQUEL) 25 MG tablet Take 1-2 tablets by mouth nightly 60 tablet 1    DULoxetine (CYMBALTA) 60 MG extended release capsule Take 1 capsule by mouth daily 30 capsule 1    meloxicam (MOBIC) 15 MG tablet Take 1 tablet by mouth daily as needed for Pain 30 tablet 1    gabapentin (NEURONTIN) 400 MG capsule Take 1 capsule by mouth 3 times daily for 30 days. 90 capsule 1    magnesium oxide (MAG-OX) 400 MG tablet Take 1 tablet by mouth 2 times daily 60 tablet 1    morphine (MS CONTIN) 30 MG extended release tablet Take 1 tablet by mouth daily for 28 days.  28 tablet 0    phenazopyridine (PYRIDIUM) 100 MG tablet Take 1 tablet by mouth 3 times daily as needed for Pain 20 tablet 0    NOVOLOG 100 UNIT/ML injection continuous. Insulin pump averages 50-80 units daily      aspirin 325 MG tablet Take 325 mg by mouth daily. carvedilol (COREG) 6.25 MG tablet Take 6.25 mg by mouth 2 times daily (with meals). atorvastatin (LIPITOR) 80 MG tablet Take 80 mg by mouth nightly. clopidogrel (PLAVIX) 75 MG tablet Take 75 mg by mouth daily. lisinopril (PRINIVIL;ZESTRIL) 10 MG tablet Take 10 mg by mouth daily. No current facility-administered medications for this visit. I will continue his current medication regimen  which is part of the above treatment schedule. It has been helping with Mr. Lorraine Palomino chronic  medical problems which for this visit include:   Diagnoses of BWC Low back sprain, initial encounter, BWC Lumbar sprain, initial encounter, BWC Sprain of lumbar region, initial encounter, BWC-Prolapsed lumbosacral intervertebral disc, and BWC Sprain of low back, initial encounter were pertinent to this visit. Risks and benefits of the medications and other alternative treatments  including no treatment were discussed with the patient. The common side effects of these medications were also explained to the patient. Informed verbal consent was obtained. Goals of current treatment regimen include improvement in pain, restoration of functioning- with focus on improvement in physical performance, general activity, work or disability,emotional distress, health care utilization and  decreased medication consumption. Will continue to monitor progress towards achieving/maintaining therapeutic goals with special emphasis on  1. Improvement in perceived interfernce  of pain with ADL's. Ability to do home exercises independently. Ability to do household chores indoor and/or outdoor work and social and leisure activities. Improve psychosocial and physical functioning. - he is showing progression towards this treatment goal with the current regimen.      He was advised against drinking alcohol with the narcotic pain medicines, advised against driving or handling machinery while adjusting the dose of medicines or if having cognitive  issues related to the current medications. Risk of overdose and death, if medicines not taken as prescribed, were also discussed. If the patient develops new symptoms or if the symptoms worsen, the patient should call the office. While transcribing every attempt was made to maintain the accuracy of the note in terms of it's contents,there may have been some errors made inadvertently. Thank you for allowing me to participate in the care of this patient.     Robert Del Rosario MD.    Cc: Lawrence Brewer MD

## 2023-02-23 ENCOUNTER — OFFICE VISIT (OUTPATIENT)
Dept: PAIN MANAGEMENT | Age: 58
End: 2023-02-23

## 2023-02-23 VITALS
SYSTOLIC BLOOD PRESSURE: 151 MMHG | BODY MASS INDEX: 28.98 KG/M2 | DIASTOLIC BLOOD PRESSURE: 79 MMHG | WEIGHT: 202.4 LBS | HEART RATE: 60 BPM | OXYGEN SATURATION: 96 % | HEIGHT: 70 IN

## 2023-02-23 DIAGNOSIS — S33.5XXA LUMBAR SPRAIN, INITIAL ENCOUNTER: ICD-10-CM

## 2023-02-23 DIAGNOSIS — M51.27 PROLAPSED LUMBOSACRAL INTERVERTEBRAL DISC: ICD-10-CM

## 2023-02-23 DIAGNOSIS — S33.5XXA SPRAIN OF LOW BACK, INITIAL ENCOUNTER: ICD-10-CM

## 2023-02-23 DIAGNOSIS — S33.5XXA SPRAIN OF LUMBAR REGION, INITIAL ENCOUNTER: ICD-10-CM

## 2023-02-23 DIAGNOSIS — G89.4 CHRONIC PAIN SYNDROME: ICD-10-CM

## 2023-02-23 DIAGNOSIS — S33.5XXA LOW BACK SPRAIN, INITIAL ENCOUNTER: ICD-10-CM

## 2023-02-23 RX ORDER — GABAPENTIN 400 MG/1
400 CAPSULE ORAL 3 TIMES DAILY
Qty: 90 CAPSULE | Refills: 1 | Status: SHIPPED | OUTPATIENT
Start: 2023-02-23 | End: 2023-03-25

## 2023-02-23 RX ORDER — MORPHINE SULFATE 30 MG/1
30 TABLET, FILM COATED, EXTENDED RELEASE ORAL DAILY
Qty: 30 TABLET | Refills: 0 | Status: SHIPPED | OUTPATIENT
Start: 2023-02-23 | End: 2023-03-25

## 2023-02-23 RX ORDER — DULOXETIN HYDROCHLORIDE 60 MG/1
60 CAPSULE, DELAYED RELEASE ORAL DAILY
Qty: 30 CAPSULE | Refills: 1 | Status: SHIPPED | OUTPATIENT
Start: 2023-02-23

## 2023-02-23 RX ORDER — QUETIAPINE FUMARATE 25 MG/1
25-50 TABLET, FILM COATED ORAL NIGHTLY
Qty: 60 TABLET | Refills: 1 | Status: SHIPPED | OUTPATIENT
Start: 2023-02-23

## 2023-02-23 RX ORDER — HYDROCODONE BITARTRATE AND ACETAMINOPHEN 7.5; 325 MG/1; MG/1
1 TABLET ORAL EVERY 6 HOURS PRN
Qty: 90 TABLET | Refills: 0 | Status: SHIPPED | OUTPATIENT
Start: 2023-02-23 | End: 2023-03-30

## 2023-02-23 RX ORDER — MELOXICAM 15 MG/1
15 TABLET ORAL DAILY PRN
Qty: 30 TABLET | Refills: 1 | Status: SHIPPED | OUTPATIENT
Start: 2023-02-23

## 2023-02-23 RX ORDER — MAGNESIUM OXIDE 400 MG/1
400 TABLET ORAL 2 TIMES DAILY
Qty: 60 TABLET | Refills: 1 | Status: SHIPPED | OUTPATIENT
Start: 2023-02-23

## 2023-02-23 NOTE — PROGRESS NOTES
Stanley Ad  1965  [id number]    HISTORY OF PRESENT ILLNESS:  Mr. Elaina Robbins is a 59 y.o. male returns for a follow up visit for multiple medical problems. His  presenting problems are   1. BWC Low back sprain, initial encounter    2. BWC Lumbar sprain, initial encounter    3. BWC Sprain of lumbar region, initial encounter    4. Chronic pain syndrome    5. BWC Sprain of low back, initial encounter    6. BWC-Prolapsed lumbosacral intervertebral disc    . As per information/history obtained from the PADT(patient assessment and documentation tool) -  He complains of pain in the lower back and knees Left with radiation to the buttocks, hips Left, and upper leg Left He rates the pain 7/10 and describes it as sharp, burning, numbness. Pain is made worse by: movement, walking, standing, bending, lifting. Current treatment regimen has helped relieve about 50% of the pain. He denies side effects from the current pain regimen. Patient reports that since the last follow up visit the physical functioning is unchanged, family/social relationships are unchanged, mood is worse and sleep patterns are worse, and that the overall functioning is unchanged. Patient denies neurological bowel or bladder. Patient denies misusing/abusing his narcotic pain medications or using any illegal drugs. There are No indicators for possible drug abuse, addiction or diversion problems. Upon obtaining the medical history from Mr. Elaina Robbins regarding today's office visit for his presenting problems, patient states she has been doing fair, pain has been manageable with the medications. He says he is working full time. He complains of increased pain with changes in weather. Extreme temperatures- cold and damp weather causes increased pain. He reports he is is using Ms. Contin with Norco 3 per day for btp. He denies any constipation. Patient states his sleep is fair. Has fairly normal sleep latency. Averages about 4-6 hours of sleep a night.  Denies any signs of sleep apnea. Feels somewhat rested in the morning. He says he is using Seroquel. Patient's  subjective report of his mood is fair. he describes occasional symptoms of depression, occasional  irritability and some mood swings. Describes his mood as being neutral and reports some pleasure in his daily activities. Reports  fair  appetite, energy and concentration. Able to function well in different aspects of his daily activities. Denies suicidal or homicidal ideation. Denies any complaints of increased tension, does   Worry sometimes and occasional  irritability  he denies any c/o increased anxiety, No c/o panic attacks or symptoms of PTSD. He says he is using Cymbalta 60 mg. ALLERGIES/PAST MED/FAM/SOC HISTORY: Mr. Flakito Seth allergies, past medical, family and social history were reviewed in the chart. Mr. Flakito Seth current medications are   Outpatient Medications Prior to Visit   Medication Sig Dispense Refill    HYDROcodone-acetaminophen (NORCO) 7.5-325 MG per tablet Take 1 tablet by mouth every 6 hours as needed for Pain (max 3 per day) for up to 28 days. 84 tablet 0    QUEtiapine (SEROQUEL) 25 MG tablet Take 1-2 tablets by mouth nightly 60 tablet 1    DULoxetine (CYMBALTA) 60 MG extended release capsule Take 1 capsule by mouth daily 30 capsule 1    meloxicam (MOBIC) 15 MG tablet Take 1 tablet by mouth daily as needed for Pain 30 tablet 1    gabapentin (NEURONTIN) 400 MG capsule Take 1 capsule by mouth 3 times daily for 30 days. 90 capsule 1    magnesium oxide (MAG-OX) 400 MG tablet Take 1 tablet by mouth 2 times daily 60 tablet 1    morphine (MS CONTIN) 30 MG extended release tablet Take 1 tablet by mouth daily for 28 days. 28 tablet 0    phenazopyridine (PYRIDIUM) 100 MG tablet Take 1 tablet by mouth 3 times daily as needed for Pain 20 tablet 0    NOVOLOG 100 UNIT/ML injection continuous. Insulin pump averages 50-80 units daily      aspirin 325 MG tablet Take 325 mg by mouth daily.       carvedilol (COREG) 6.25 MG tablet Take 6.25 mg by mouth 2 times daily (with meals). atorvastatin (LIPITOR) 80 MG tablet Take 80 mg by mouth nightly. clopidogrel (PLAVIX) 75 MG tablet Take 75 mg by mouth daily. lisinopril (PRINIVIL;ZESTRIL) 10 MG tablet Take 10 mg by mouth daily. No facility-administered medications prior to visit. REVIEW OF SYSTEMS: .   Respiratory: Negative for shortness of breath. Cardiovascular: Negative for chest pain, palpitations  Gastrointestinal: Negative for blood in stool, abdominal distention, nausea, vomiting, abdominal pain, diarrhea,constipation. Neurological: Negative for speech difficulty, weakness and light-headedness, dizziness, tremors, sleepiness  Psychiatric/Behavioral: Negative for suicidal ideas, hallucinations, behavioral problems, self-injury, decreased concentration/cognition, agitation, confusion. PHYSICAL EXAM:   Nursing note and vitals reviewed. BP (!) 151/79   Pulse 60   Ht 5' 10\" (1.778 m)   Wt 202 lb 6.4 oz (91.8 kg)   SpO2 96%   BMI 29.04 kg/m²   General Appearance: Patient is well nourished, well developed, well groomed and in no acute distress. Skin: Skin is warm and dry, good turgor . No rash or lesions noted. He is not diaphoretic. Pulmonary/Chest: Effort normal. No respiratory distress or use of accessory muscles. Auscultation revealing normal air entry. He does not have wheezes in the lung fields. He has no rales. Cardiovascular: Normal rate, regular rhythm, normal heart sounds, and does not have murmur. Exam reveals no gallop and no friction rub. Musculoskeletal / Extremities: Range of motion is normal. Gait is normal, assistive devices use: none. He exhibits edema: none, and no tenderness. Neurological/Psychiatric:He is alert and oriented to person, place, and time. Coordination is  normal.   Judgement and Insight is normal  His mood is Appropriate and affect is Neutral/Euthymic(normal) .  His behavior is normal. thought content normal.        IMPRESSION:     1. BWC Low back sprain, initial encounter    2. BWC Lumbar sprain, initial encounter    3. BWC Sprain of lumbar region, initial encounter    4. Chronic pain syndrome    5. BWC Sprain of low back, initial encounter    6. BWC-Prolapsed lumbosacral intervertebral disc        PLAN:  Informed verbal consent was obtained. -ROM/Stretching exercises as advised   -He was advised to increase fluids ( 5-7  glasses of fluid daily), limit caffeine, avoid cheese products, increase dietary fiber, increase activity and exercise as tolerated and relax regularly and enjoy meals   -Continue with Ms. Contin with Norco for btp   -He was advised weight reduction, diet changes- 800-1200 vicki diet, diet diary, exercising, nutritional  consult increased physical activity as tolerated   -Continue with Neurontin   -Discussed use, benefit, and side effects of prescribed medications. Barriers to medication compliance addressed. All patient questions answered. Pt voiced understanding.    -Continue with Mobic 15 mg daily as needed   -Continue with Seroquel 25 mg   -he was advised proper sleep hygiene-told to avoid:use of caffeine or other stimulants after noon, alcohol use near bedtime, long or frequent naps during the day, erratic sleep schedule, heavy meals near bedtime, vigorous exercise near bedtime and use of electronic devices near bedtime   -Continue with Cymbalta   -CBT techniques- relaxation therapies such as biofeedback, mindfulness based stress reduction, imagery, cognitive restructuring, problem solving discussed with patient. Mr. Ruthy Najjar will be prescribed  the medications  listed below which are for treatment of his presenting  medical problems which for this visit include:   Diagnoses of BWC Low back sprain, initial encounter, BWC Lumbar sprain, initial encounter, BWC Sprain of lumbar region, initial encounter, Chronic pain syndrome, BWC Sprain of low back, initial encounter, and [facility]-Prolapsed lumbosacral intervertebral disc were pertinent to this visit. Medications/orders associated with this visit:    Current Outpatient Medications   Medication Sig Dispense Refill    HYDROcodone-acetaminophen (NORCO) 7.5-325 MG per tablet Take 1 tablet by mouth every 6 hours as needed for Pain (max 3 per day) for up to 28 days. 84 tablet 0    QUEtiapine (SEROQUEL) 25 MG tablet Take 1-2 tablets by mouth nightly 60 tablet 1    DULoxetine (CYMBALTA) 60 MG extended release capsule Take 1 capsule by mouth daily 30 capsule 1    meloxicam (MOBIC) 15 MG tablet Take 1 tablet by mouth daily as needed for Pain 30 tablet 1    gabapentin (NEURONTIN) 400 MG capsule Take 1 capsule by mouth 3 times daily for 30 days. 90 capsule 1    magnesium oxide (MAG-OX) 400 MG tablet Take 1 tablet by mouth 2 times daily 60 tablet 1    morphine (MS CONTIN) 30 MG extended release tablet Take 1 tablet by mouth daily for 28 days. 28 tablet 0    phenazopyridine (PYRIDIUM) 100 MG tablet Take 1 tablet by mouth 3 times daily as needed for Pain 20 tablet 0    NOVOLOG 100 UNIT/ML injection continuous. Insulin pump averages 50-80 units daily      aspirin 325 MG tablet Take 325 mg by mouth daily. carvedilol (COREG) 6.25 MG tablet Take 6.25 mg by mouth 2 times daily (with meals). atorvastatin (LIPITOR) 80 MG tablet Take 80 mg by mouth nightly. clopidogrel (PLAVIX) 75 MG tablet Take 75 mg by mouth daily. lisinopril (PRINIVIL;ZESTRIL) 10 MG tablet Take 10 mg by mouth daily. No current facility-administered medications for this visit. Goals of current treatment regimen include improvement in pain, restoration of functioning- with focus on improvement in physical performance, general activity, work or disability,emotional distress, health care utilization and  decreased medication consumption. Will continue to monitor progress towards achieving/maintaining therapeutic goals with special emphasis on  1.  Improvement in perceived interfernce  of pain with ADL's. Ability to do home exercises independently. Ability to do household chores indoor and/or outdoor work and social and leisure activities. To increase flexibility/ROM, strength and endurance. Improve psychosocial and physical functioning.- he is showing progression towards this treatment goal with the current regimen. 2. Improving sleep to 6-7 hours a night. Improve mood/ anxiety and depression symptoms such as crying spells, low energy, problems with concentration, motivation.- he is showing progression towards this treatment goal with the current regimen. 3. Reduction of reliance on opioid analgesia/more appropriate opioid use. - he is showing progression towards this treatment goal with the current regimen. Risks and benefits of the medications and other alternative treatments have been/were  discussed with the patient. Any questions on the  common side effects of these medications were also answered. He was advised against drinking alcohol with the narcotic pain medicines, advised against driving or handling machinery when  starting or adjusting the dose of medicines, feeling groggy or drowsy, or if having any cognitive issues related to the current medications. Heis fully aware of the risk of overdose and death, if medicines are misused and not taken as prescribed. If he develops new symptoms or if the symptoms worsen, he was told to call the office. .  Thank you for allowing me to participate in the care of this patient.     Carmela Mendes MD    Cc: Fareed Mcmahan MD

## 2023-03-27 ENCOUNTER — OFFICE VISIT (OUTPATIENT)
Dept: PAIN MANAGEMENT | Age: 58
End: 2023-03-27

## 2023-03-27 VITALS
DIASTOLIC BLOOD PRESSURE: 66 MMHG | SYSTOLIC BLOOD PRESSURE: 145 MMHG | HEART RATE: 52 BPM | BODY MASS INDEX: 29.47 KG/M2 | OXYGEN SATURATION: 99 % | WEIGHT: 205.4 LBS

## 2023-03-27 DIAGNOSIS — M51.27 PROLAPSED LUMBOSACRAL INTERVERTEBRAL DISC: ICD-10-CM

## 2023-03-27 DIAGNOSIS — S33.5XXA SPRAIN OF LUMBAR REGION, INITIAL ENCOUNTER: ICD-10-CM

## 2023-03-27 DIAGNOSIS — S33.5XXA LOW BACK SPRAIN, INITIAL ENCOUNTER: ICD-10-CM

## 2023-03-27 DIAGNOSIS — G89.4 CHRONIC PAIN SYNDROME: ICD-10-CM

## 2023-03-27 DIAGNOSIS — S33.5XXA SPRAIN OF LOW BACK, INITIAL ENCOUNTER: ICD-10-CM

## 2023-03-27 DIAGNOSIS — S33.5XXA LUMBAR SPRAIN, INITIAL ENCOUNTER: ICD-10-CM

## 2023-03-27 RX ORDER — MORPHINE SULFATE 30 MG/1
30 TABLET, FILM COATED, EXTENDED RELEASE ORAL DAILY
Qty: 28 TABLET | Refills: 0 | Status: SHIPPED | OUTPATIENT
Start: 2023-03-27 | End: 2023-04-24

## 2023-03-27 RX ORDER — HYDROCODONE BITARTRATE AND ACETAMINOPHEN 7.5; 325 MG/1; MG/1
1 TABLET ORAL EVERY 6 HOURS PRN
Qty: 84 TABLET | Refills: 0 | Status: SHIPPED | OUTPATIENT
Start: 2023-03-27 | End: 2023-04-24

## 2023-03-27 NOTE — PROGRESS NOTES
the accuracy of the note in terms of it's contents,there may have been some errors made inadvertently. Thank you for allowing me to participate in the care of this patient.     Candy Joseph MD.    Cc: Nathalie Meckel, MD

## 2023-03-28 ENCOUNTER — TELEPHONE (OUTPATIENT)
Dept: PAIN MANAGEMENT | Age: 58
End: 2023-03-28

## 2023-04-24 ENCOUNTER — OFFICE VISIT (OUTPATIENT)
Dept: PAIN MANAGEMENT | Age: 58
End: 2023-04-24

## 2023-04-24 VITALS
HEART RATE: 67 BPM | BODY MASS INDEX: 29.87 KG/M2 | OXYGEN SATURATION: 97 % | SYSTOLIC BLOOD PRESSURE: 146 MMHG | WEIGHT: 208.2 LBS | DIASTOLIC BLOOD PRESSURE: 87 MMHG

## 2023-04-24 DIAGNOSIS — S33.5XXA LOW BACK SPRAIN, INITIAL ENCOUNTER: ICD-10-CM

## 2023-04-24 DIAGNOSIS — S33.5XXA SPRAIN OF LOW BACK, INITIAL ENCOUNTER: ICD-10-CM

## 2023-04-24 DIAGNOSIS — G89.4 CHRONIC PAIN SYNDROME: ICD-10-CM

## 2023-04-24 DIAGNOSIS — S33.5XXA SPRAIN OF LUMBAR REGION, INITIAL ENCOUNTER: ICD-10-CM

## 2023-04-24 DIAGNOSIS — S33.5XXA LUMBAR SPRAIN, INITIAL ENCOUNTER: ICD-10-CM

## 2023-04-24 DIAGNOSIS — M51.27 PROLAPSED LUMBOSACRAL INTERVERTEBRAL DISC: ICD-10-CM

## 2023-04-24 RX ORDER — HYDROCODONE BITARTRATE AND ACETAMINOPHEN 7.5; 325 MG/1; MG/1
1 TABLET ORAL EVERY 6 HOURS PRN
Qty: 84 TABLET | Refills: 0 | Status: SHIPPED | OUTPATIENT
Start: 2023-04-24 | End: 2023-05-22

## 2023-04-24 RX ORDER — MORPHINE SULFATE 30 MG/1
30 TABLET, FILM COATED, EXTENDED RELEASE ORAL DAILY
Qty: 28 TABLET | Refills: 0 | Status: SHIPPED | OUTPATIENT
Start: 2023-04-24 | End: 2023-05-22

## 2023-04-25 NOTE — PROGRESS NOTES
IMPRESSION:     1. BWC-Low back sprain, initial encounter    2. BWC-Prolapsed lumbosacral intervertebral disc    3. BWC Lumbar sprain, initial encounter    4. BWC-Sprain of low back, initial encounter    5. BWC-Sprain of lumbar region, initial encounter    6. Chronic pain syndrome    7. BWC Low back sprain, initial encounter    8. BWC Sprain of lumbar region, initial encounter    9. BWC Sprain of low back, initial encounter        PLAN:  Informed verbal consent was obtained.  -Continue with Ms. Contin with [place] for btp 3 per day   -He was advised to increase fluids ( 5-7  glasses of fluid daily), limit caffeine, avoid cheese products, increase dietary fiber, increase activity and exercise as tolerated and relax regularly and enjoy meals   -Continue with all adjuvants as before   -he was advised proper sleep hygiene-told to avoid:use of caffeine or other stimulants after noon, alcohol use near bedtime, long or frequent naps during the day, erratic sleep schedule, heavy meals near bedtime, vigorous exercise near bedtime and use of electronic devices near bedtime   -Continue with Seroquel   -Advised caffeine reduction, dietary changes, elevate head end of bed, NPO after supper, if using alcohol advised reduction of alcohol intake, tobacco cessation if smoking, weight loss   -Continue with Mobic as needed   -CBT techniques- relaxation therapies such as biofeedback, mindfulness based stress reduction, imagery, cognitive restructuring, problem solving discussed with patient   -Continue with Cymbalta 60 mg   Mr. Kia Savage will be prescribed  the medications  listed below which are for treatment of his presenting  medical problems which for this visit include:   Diagnoses of BWC-Low back sprain, initial encounter, BWC-Prolapsed lumbosacral intervertebral disc, BWC Lumbar sprain, initial encounter, BWC-Sprain of low back, initial encounter, BWC-Sprain of lumbar region, initial encounter, Chronic pain syndrome, BWC Low

## 2023-05-22 ENCOUNTER — OFFICE VISIT (OUTPATIENT)
Dept: PAIN MANAGEMENT | Age: 58
End: 2023-05-22

## 2023-05-22 VITALS
BODY MASS INDEX: 28.41 KG/M2 | HEART RATE: 73 BPM | SYSTOLIC BLOOD PRESSURE: 147 MMHG | WEIGHT: 198 LBS | DIASTOLIC BLOOD PRESSURE: 79 MMHG

## 2023-05-22 DIAGNOSIS — S33.5XXA LOW BACK SPRAIN, INITIAL ENCOUNTER: ICD-10-CM

## 2023-05-22 DIAGNOSIS — S33.5XXA LUMBAR SPRAIN, INITIAL ENCOUNTER: ICD-10-CM

## 2023-05-22 DIAGNOSIS — S33.5XXA SPRAIN OF LUMBAR REGION, INITIAL ENCOUNTER: ICD-10-CM

## 2023-05-22 DIAGNOSIS — M51.27 PROLAPSED LUMBOSACRAL INTERVERTEBRAL DISC: ICD-10-CM

## 2023-05-22 DIAGNOSIS — S33.5XXA SPRAIN OF LOW BACK, INITIAL ENCOUNTER: ICD-10-CM

## 2023-05-22 DIAGNOSIS — G89.4 CHRONIC PAIN SYNDROME: ICD-10-CM

## 2023-05-22 RX ORDER — HYDROCODONE BITARTRATE AND ACETAMINOPHEN 7.5; 325 MG/1; MG/1
1 TABLET ORAL EVERY 6 HOURS PRN
Qty: 84 TABLET | Refills: 0 | Status: SHIPPED | OUTPATIENT
Start: 2023-05-22 | End: 2023-06-19

## 2023-05-22 RX ORDER — MORPHINE SULFATE 30 MG/1
30 TABLET, FILM COATED, EXTENDED RELEASE ORAL DAILY
Qty: 28 TABLET | Refills: 0 | Status: SHIPPED | OUTPATIENT
Start: 2023-05-22 | End: 2023-06-19

## 2023-05-31 DIAGNOSIS — S33.5XXA SPRAIN OF LOW BACK, INITIAL ENCOUNTER: ICD-10-CM

## 2023-05-31 DIAGNOSIS — S33.5XXA LUMBAR SPRAIN, INITIAL ENCOUNTER: ICD-10-CM

## 2023-05-31 DIAGNOSIS — M51.27 PROLAPSED LUMBOSACRAL INTERVERTEBRAL DISC: ICD-10-CM

## 2023-05-31 DIAGNOSIS — G89.4 CHRONIC PAIN SYNDROME: ICD-10-CM

## 2023-05-31 DIAGNOSIS — S33.5XXA SPRAIN OF LUMBAR REGION, INITIAL ENCOUNTER: ICD-10-CM

## 2023-05-31 DIAGNOSIS — S33.5XXA LOW BACK SPRAIN, INITIAL ENCOUNTER: ICD-10-CM

## 2023-06-01 RX ORDER — MELOXICAM 15 MG/1
15 TABLET ORAL DAILY PRN
Qty: 30 TABLET | Refills: 1 | OUTPATIENT
Start: 2023-06-01

## 2023-06-19 ENCOUNTER — OFFICE VISIT (OUTPATIENT)
Dept: PAIN MANAGEMENT | Age: 58
End: 2023-06-19

## 2023-06-19 VITALS
BODY MASS INDEX: 28.12 KG/M2 | DIASTOLIC BLOOD PRESSURE: 80 MMHG | WEIGHT: 196 LBS | HEART RATE: 78 BPM | SYSTOLIC BLOOD PRESSURE: 124 MMHG

## 2023-06-19 DIAGNOSIS — S33.5XXA SPRAIN OF LUMBAR REGION, INITIAL ENCOUNTER: ICD-10-CM

## 2023-06-19 DIAGNOSIS — M51.27 PROLAPSED LUMBOSACRAL INTERVERTEBRAL DISC: ICD-10-CM

## 2023-06-19 DIAGNOSIS — S33.5XXA LOW BACK SPRAIN, INITIAL ENCOUNTER: ICD-10-CM

## 2023-06-19 DIAGNOSIS — S33.5XXA SPRAIN OF LOW BACK, INITIAL ENCOUNTER: ICD-10-CM

## 2023-06-19 DIAGNOSIS — S33.5XXA LUMBAR SPRAIN, INITIAL ENCOUNTER: ICD-10-CM

## 2023-06-19 RX ORDER — HYDROCODONE BITARTRATE AND ACETAMINOPHEN 7.5; 325 MG/1; MG/1
1 TABLET ORAL EVERY 6 HOURS PRN
Qty: 84 TABLET | Refills: 0 | Status: SHIPPED | OUTPATIENT
Start: 2023-06-19 | End: 2023-07-17

## 2023-06-19 RX ORDER — MELOXICAM 15 MG/1
15 TABLET ORAL DAILY PRN
Qty: 30 TABLET | Refills: 1 | Status: SHIPPED | OUTPATIENT
Start: 2023-06-19

## 2023-06-19 RX ORDER — QUETIAPINE FUMARATE 25 MG/1
25-50 TABLET, FILM COATED ORAL NIGHTLY
Qty: 60 TABLET | Refills: 1 | Status: SHIPPED | OUTPATIENT
Start: 2023-06-19

## 2023-06-19 RX ORDER — MORPHINE SULFATE 30 MG/1
30 TABLET, FILM COATED, EXTENDED RELEASE ORAL DAILY
Qty: 28 TABLET | Refills: 0 | Status: SHIPPED | OUTPATIENT
Start: 2023-06-19 | End: 2023-07-17

## 2023-06-19 NOTE — PROGRESS NOTES
for allowing me to participate in the care of this patient.     Khari Mcdonald MD    Cc: Cuco Lopez MD

## 2023-07-07 DIAGNOSIS — S33.5XXA LUMBAR SPRAIN, INITIAL ENCOUNTER: ICD-10-CM

## 2023-07-07 DIAGNOSIS — M51.27 PROLAPSED LUMBOSACRAL INTERVERTEBRAL DISC: ICD-10-CM

## 2023-07-17 ENCOUNTER — OFFICE VISIT (OUTPATIENT)
Dept: PAIN MANAGEMENT | Age: 58
End: 2023-07-17

## 2023-07-17 VITALS
BODY MASS INDEX: 27.84 KG/M2 | HEART RATE: 82 BPM | WEIGHT: 194 LBS | SYSTOLIC BLOOD PRESSURE: 128 MMHG | DIASTOLIC BLOOD PRESSURE: 77 MMHG

## 2023-07-17 DIAGNOSIS — M51.27 PROLAPSED LUMBOSACRAL INTERVERTEBRAL DISC: ICD-10-CM

## 2023-07-17 DIAGNOSIS — S33.5XXA LUMBAR SPRAIN, INITIAL ENCOUNTER: ICD-10-CM

## 2023-07-17 DIAGNOSIS — S33.5XXA LOW BACK SPRAIN, INITIAL ENCOUNTER: ICD-10-CM

## 2023-07-17 DIAGNOSIS — S33.5XXA SPRAIN OF LOW BACK, INITIAL ENCOUNTER: ICD-10-CM

## 2023-07-17 DIAGNOSIS — S33.5XXA SPRAIN OF LUMBAR REGION, INITIAL ENCOUNTER: ICD-10-CM

## 2023-07-17 RX ORDER — MELOXICAM 15 MG/1
15 TABLET ORAL DAILY PRN
Qty: 30 TABLET | Refills: 1 | OUTPATIENT
Start: 2023-07-17

## 2023-07-17 RX ORDER — QUETIAPINE FUMARATE 25 MG/1
25-50 TABLET, FILM COATED ORAL NIGHTLY
Qty: 60 TABLET | Refills: 1 | Status: SHIPPED | OUTPATIENT
Start: 2023-07-17

## 2023-07-17 RX ORDER — HYDROCODONE BITARTRATE AND ACETAMINOPHEN 7.5; 325 MG/1; MG/1
1 TABLET ORAL EVERY 6 HOURS PRN
Qty: 84 TABLET | Refills: 0 | Status: SHIPPED | OUTPATIENT
Start: 2023-07-17 | End: 2023-08-14

## 2023-07-17 RX ORDER — MELOXICAM 15 MG/1
15 TABLET ORAL DAILY PRN
Qty: 30 TABLET | Refills: 1 | Status: SHIPPED | OUTPATIENT
Start: 2023-07-17

## 2023-07-17 RX ORDER — MORPHINE SULFATE 30 MG/1
30 TABLET, FILM COATED, EXTENDED RELEASE ORAL DAILY
Qty: 28 TABLET | Refills: 0 | Status: SHIPPED | OUTPATIENT
Start: 2023-07-17 | End: 2023-08-14

## 2023-07-17 NOTE — PROGRESS NOTES
Devineliane Jacob  1965  [id number]      HISTORY OF PRESENT ILLNESS:  Mr. Oscar Serrato is a 59 y.o. male returns for a follow up visit for pain management  He has a diagnosis of   1. BWC-Prolapsed lumbosacral intervertebral disc    2. BWC-Sprain of low back, initial encounter    3. BWC Lumbar sprain, initial encounter    4. BWC-Sprain of lumbar region, initial encounter    5. BWC-Low back sprain, initial encounter    . He complains of pain in the  lower back, buttocks, left knee with radiation to the left hip, left leg  He rates the pain 6/10 and describes it as burning, numbness, pins and needles. Current treatment regimen has helped relieve about 50% of the pain. He denies any side effects from the current pain regimen. Patient reports that since the last follow up visit the physical functioning is worse, family/social relationships are unchanged, mood is unchanged sleep patterns are worse, and that the overall functioning is worse. Patient denies misusing/abusing his narcotic pain medications or using any illegal drugs. There are No indicators for possible drug abuse, addiction or diversion problems. Patient states he has been doing fair, working full time. He says he is using Ms. Contin along with Norco. He mentions he was unable to get his Mobic last visit, which is helping with the pain. He denies any side effects with the medications. ALLERGIES: Patients list of allergies were reviewed     MEDICATIONS: Mr. Oscar Serrato list of medications were reviewed. His current medications are   Outpatient Medications Prior to Visit   Medication Sig Dispense Refill    QUEtiapine (SEROQUEL) 25 MG tablet Take 1-2 tablets by mouth nightly 60 tablet 1    meloxicam (MOBIC) 15 MG tablet Take 1 tablet by mouth daily as needed for Pain 30 tablet 1    DULoxetine (CYMBALTA) 60 MG extended release capsule Take 1 capsule by mouth daily 30 capsule 1    magnesium oxide (MAG-OX) 400 MG tablet Take 1 tablet by mouth 2 times daily 60 tablet

## 2023-08-14 ENCOUNTER — OFFICE VISIT (OUTPATIENT)
Dept: PAIN MANAGEMENT | Age: 58
End: 2023-08-14

## 2023-08-14 VITALS
WEIGHT: 195 LBS | HEART RATE: 60 BPM | SYSTOLIC BLOOD PRESSURE: 126 MMHG | DIASTOLIC BLOOD PRESSURE: 76 MMHG | BODY MASS INDEX: 27.98 KG/M2

## 2023-08-14 DIAGNOSIS — S33.5XXA SPRAIN OF LOW BACK, INITIAL ENCOUNTER: ICD-10-CM

## 2023-08-14 DIAGNOSIS — G89.4 CHRONIC PAIN SYNDROME: ICD-10-CM

## 2023-08-14 DIAGNOSIS — M51.27 PROLAPSED LUMBOSACRAL INTERVERTEBRAL DISC: ICD-10-CM

## 2023-08-14 DIAGNOSIS — S33.5XXA LUMBAR SPRAIN, INITIAL ENCOUNTER: ICD-10-CM

## 2023-08-14 DIAGNOSIS — S33.5XXA SPRAIN OF LUMBAR REGION, INITIAL ENCOUNTER: ICD-10-CM

## 2023-08-14 DIAGNOSIS — S33.5XXA LOW BACK SPRAIN, INITIAL ENCOUNTER: ICD-10-CM

## 2023-08-14 RX ORDER — MELOXICAM 15 MG/1
TABLET ORAL
Qty: 15 TABLET | Refills: 0
Start: 2023-08-14

## 2023-08-14 RX ORDER — MORPHINE SULFATE 30 MG/1
30 TABLET, FILM COATED, EXTENDED RELEASE ORAL
Qty: 28 TABLET | Refills: 0 | Status: SHIPPED | OUTPATIENT
Start: 2023-08-14 | End: 2023-09-11

## 2023-08-14 RX ORDER — HYDROCODONE BITARTRATE AND ACETAMINOPHEN 7.5; 325 MG/1; MG/1
1 TABLET ORAL EVERY 6 HOURS PRN
Qty: 84 TABLET | Refills: 0 | Status: SHIPPED | OUTPATIENT
Start: 2023-08-14 | End: 2023-09-11

## 2023-08-18 NOTE — PROGRESS NOTES
Milly Troy  1965  [id number]    HISTORY OF PRESENT ILLNESS:  Mr. Anibal Real is a 59 y.o. male returns for a follow up visit for multiple medical problems. His  presenting problems are   1. BWC-Prolapsed lumbosacral intervertebral disc    2. BWC-Sprain of lumbar region, initial encounter    3. BWC Lumbar sprain, initial encounter    4. BWC-Low back sprain, initial encounter    5. Chronic pain syndrome    6. BWC-Sprain of low back, initial encounter    . As per information/history obtained from the PADT(patient assessment and documentation tool) -  He complains of pain in the lower back and knees Left with radiation to the buttocks, hips Left, and upper leg Left He rates the pain 7/10 and describes it as aching, burning, numbness. Pain is made worse by: movement, walking, standing, bending, lifting. Current treatment regimen has helped relieve about 60% of the pain. He denies side effects from the current pain regimen. Patient reports that since the last follow up visit the physical functioning is unchanged, family/social relationships are unchanged, mood is unchanged and sleep patterns are worse, and that the overall functioning is unchanged. Patient denies neurological bowel or bladder. Patient denies misusing/abusing his narcotic pain medications or using any illegal drugs. There are No indicators for possible drug abuse, addiction or diversion problems. Upon obtaining the medical history from Mr. Anibal Real regarding today's office visit for his presenting problems, patient states he has been doing fair. Mr. Anibal Real is complaining of pain in the back and legs. He mentions he is using Ms Contin along with Norco. Patient denies any constipation symptoms or cognitive side effects. Patient reports she is working full time still. He says he is using Mobic. Patient states he sleeps well. Has normal sleep latency. Averages about 5-7 hours of sleep a night. Denies any signs of sleep apnea.  Feels rested in the AM.

## 2023-09-11 ENCOUNTER — OFFICE VISIT (OUTPATIENT)
Dept: PAIN MANAGEMENT | Age: 58
End: 2023-09-11

## 2023-09-11 VITALS
DIASTOLIC BLOOD PRESSURE: 76 MMHG | HEART RATE: 59 BPM | OXYGEN SATURATION: 96 % | SYSTOLIC BLOOD PRESSURE: 120 MMHG | WEIGHT: 191 LBS | BODY MASS INDEX: 27.41 KG/M2

## 2023-09-11 DIAGNOSIS — G89.4 CHRONIC PAIN SYNDROME: ICD-10-CM

## 2023-09-11 DIAGNOSIS — M51.27 PROLAPSED LUMBOSACRAL INTERVERTEBRAL DISC: ICD-10-CM

## 2023-09-11 DIAGNOSIS — S33.5XXA SPRAIN OF LUMBAR REGION, INITIAL ENCOUNTER: ICD-10-CM

## 2023-09-11 DIAGNOSIS — S33.5XXA LUMBAR SPRAIN, INITIAL ENCOUNTER: ICD-10-CM

## 2023-09-11 DIAGNOSIS — S33.5XXA SPRAIN OF LOW BACK, INITIAL ENCOUNTER: ICD-10-CM

## 2023-09-11 DIAGNOSIS — S33.5XXA LOW BACK SPRAIN, INITIAL ENCOUNTER: ICD-10-CM

## 2023-09-11 RX ORDER — HYDROCODONE BITARTRATE AND ACETAMINOPHEN 7.5; 325 MG/1; MG/1
1 TABLET ORAL EVERY 6 HOURS PRN
Qty: 84 TABLET | Refills: 0 | Status: SHIPPED | OUTPATIENT
Start: 2023-09-11 | End: 2023-10-09

## 2023-09-11 RX ORDER — MORPHINE SULFATE 30 MG/1
30 TABLET, FILM COATED, EXTENDED RELEASE ORAL
Qty: 28 TABLET | Refills: 0 | Status: SHIPPED | OUTPATIENT
Start: 2023-09-11 | End: 2023-10-09

## 2023-09-11 RX ORDER — MELOXICAM 15 MG/1
TABLET ORAL
Qty: 15 TABLET | Refills: 0 | Status: SHIPPED | OUTPATIENT
Start: 2023-09-11

## 2023-09-12 NOTE — PROGRESS NOTES
Carolyn Cummings  1965  [id number]      HISTORY OF PRESENT ILLNESS:  Mr. Jeb Yepez is a 59 y.o. male returns for a follow up visit for pain management  He has a diagnosis of   1. BWC-Prolapsed lumbosacral intervertebral disc    2. BWC-Low back sprain, initial encounter    3. BWC-Sprain of lumbar region, initial encounter    4. BWC Lumbar sprain, initial encounter    5. Chronic pain syndrome    6. BWC-Sprain of low back, initial encounter    . As per information/history obtained from the PADT(patient assessment and documentation tool) -  He complains of pain in the lower back and knees Left with radiation to the buttocks, hips Left, and upper leg Left He rates the pain 7/10 and describes it as sharp, aching, burning, numbness, pins and needles. Pain is made worse by: movement, walking, standing, bending, lifting. He denies any side effects from the current pain regimen. Patient reports that since starting the current regimen under my care the physical functioning is better, family/social relationships are better, mood is better sleep patterns are better, and that the overall functioning is better. Patient denies misusing/abusing his narcotic pain medications or using any illegal drugs. There are No indicators for possible drug abuse, addiction or diversion problems. Patient states he has been doing okay. Mr. Jeb Yepez states he had a follow up on his cystoscopy biopsy done. He states he has been compliant with his medications. Patient mentions he is using all the adjuvants along with Ms Frances Davila and Norco for btp. Patient reports he is working full time and managing his ADL's. ALLERGIES: Patients list of allergies were reviewed     MEDICATIONS: Mr. Jeb Yepez list of medications were reviewed. His current medications are   Outpatient Medications Prior to Visit   Medication Sig Dispense Refill    QUEtiapine (SEROQUEL) 25 MG tablet Take 1-2 tablets by mouth nightly 60 tablet 1    DULoxetine (CYMBALTA) 60 MG extended

## 2023-10-04 DIAGNOSIS — S33.5XXA SPRAIN OF LOW BACK, INITIAL ENCOUNTER: ICD-10-CM

## 2023-10-04 DIAGNOSIS — M51.27 PROLAPSED LUMBOSACRAL INTERVERTEBRAL DISC: ICD-10-CM

## 2023-10-04 DIAGNOSIS — S33.5XXA SPRAIN OF LUMBAR REGION, INITIAL ENCOUNTER: ICD-10-CM

## 2023-10-04 DIAGNOSIS — S33.5XXA LOW BACK SPRAIN, INITIAL ENCOUNTER: ICD-10-CM

## 2023-10-04 DIAGNOSIS — S33.5XXA LUMBAR SPRAIN, INITIAL ENCOUNTER: ICD-10-CM

## 2023-10-09 ENCOUNTER — TELEPHONE (OUTPATIENT)
Dept: PAIN MANAGEMENT | Age: 58
End: 2023-10-09

## 2023-10-09 ENCOUNTER — OFFICE VISIT (OUTPATIENT)
Dept: PAIN MANAGEMENT | Age: 58
End: 2023-10-09

## 2023-10-09 VITALS
OXYGEN SATURATION: 98 % | BODY MASS INDEX: 27.41 KG/M2 | HEART RATE: 70 BPM | SYSTOLIC BLOOD PRESSURE: 143 MMHG | WEIGHT: 191 LBS | DIASTOLIC BLOOD PRESSURE: 82 MMHG

## 2023-10-09 DIAGNOSIS — G89.4 CHRONIC PAIN SYNDROME: ICD-10-CM

## 2023-10-09 DIAGNOSIS — S33.5XXA LOW BACK SPRAIN, INITIAL ENCOUNTER: ICD-10-CM

## 2023-10-09 DIAGNOSIS — S33.5XXA SPRAIN OF LUMBAR REGION, INITIAL ENCOUNTER: ICD-10-CM

## 2023-10-09 DIAGNOSIS — S33.5XXA SPRAIN OF LOW BACK, INITIAL ENCOUNTER: ICD-10-CM

## 2023-10-09 DIAGNOSIS — S33.5XXA LUMBAR SPRAIN, INITIAL ENCOUNTER: ICD-10-CM

## 2023-10-09 DIAGNOSIS — M51.27 PROLAPSED LUMBOSACRAL INTERVERTEBRAL DISC: ICD-10-CM

## 2023-10-09 RX ORDER — MAGNESIUM OXIDE 400 MG/1
400 TABLET ORAL 2 TIMES DAILY
Qty: 60 TABLET | Refills: 1 | Status: SHIPPED | OUTPATIENT
Start: 2023-10-09

## 2023-10-09 RX ORDER — HYDROCODONE BITARTRATE AND ACETAMINOPHEN 7.5; 325 MG/1; MG/1
1 TABLET ORAL EVERY 6 HOURS PRN
Qty: 84 TABLET | Refills: 0 | Status: SHIPPED | OUTPATIENT
Start: 2023-10-09 | End: 2023-11-06

## 2023-10-09 RX ORDER — QUETIAPINE FUMARATE 25 MG/1
25-50 TABLET, FILM COATED ORAL NIGHTLY
Qty: 60 TABLET | Refills: 1 | Status: SHIPPED | OUTPATIENT
Start: 2023-10-09

## 2023-10-09 RX ORDER — MELOXICAM 15 MG/1
TABLET ORAL
Qty: 15 TABLET | Refills: 0 | Status: SHIPPED | OUTPATIENT
Start: 2023-10-09

## 2023-10-09 RX ORDER — DULOXETIN HYDROCHLORIDE 60 MG/1
60 CAPSULE, DELAYED RELEASE ORAL DAILY
Qty: 30 CAPSULE | Refills: 1 | Status: SHIPPED | OUTPATIENT
Start: 2023-10-09

## 2023-10-09 RX ORDER — MORPHINE SULFATE 30 MG/1
30 TABLET, FILM COATED, EXTENDED RELEASE ORAL
Qty: 28 TABLET | Refills: 0 | Status: SHIPPED | OUTPATIENT
Start: 2023-10-09 | End: 2023-11-06

## 2023-10-09 NOTE — TELEPHONE ENCOUNTER
Submitted PA for Morphine Via CMM Key: [id number] STATUS: APPROVED  10/9/2023- 4/6/2024. [address] has been notified. Thank you!

## 2023-10-09 NOTE — PROGRESS NOTES
Willy Gama  1965  [id number]    HISTORY OF PRESENT ILLNESS:  Mr. Kalpana Perdomo is a 59 y.o. male returns for a follow up visit for multiple medical problems. His  presenting problems are   1. BWC-Prolapsed lumbosacral intervertebral disc    2. BWC-Sprain of lumbar region, initial encounter    3. BWC-Sprain of low back, initial encounter    4. BWC-Low back sprain, initial encounter    5. BWC Lumbar sprain, initial encounter    6. Chronic pain syndrome    7. BWC Low back sprain, initial encounter    8. BWC Sprain of lumbar region, initial encounter    9. BWC Sprain of low back, initial encounter    . As per information/history obtained from the PADT(patient assessment and documentation tool) -  He complains of pain in the lower back with radiation to the buttocks, hips Left, upper leg Left, and knees Left He rates the pain 7/10 and describes it as sharp, aching, burning. Pain is made worse by: movement, walking, standing, sitting, bending, lifting. Current treatment regimen has helped relieve about 50% of the pain. He denies side effects from the current pain regimen. Patient reports that since starting the current treatment regimen the physical functioning is better, family/social relationships are better, mood is better and sleep patterns are better, and that the overall functioning is better. Patient complains since his last office visit his physical and overall functioning has been worse. Patient denies neurological bowel or bladder. Patient denies misusing/abusing his narcotic pain medications or using any illegal drugs. There are No indicators for possible drug abuse, addiction or diversion problems. Upon obtaining the medical history from Mr. Kalpana Perdomo regarding today's office visit for his presenting problems, Patient reports he has been doing fair, states he has been sick since morning. He says he is off work today. He mentions he is using Mobic daily as needed.  He complains the pain is greater in the

## 2023-10-12 ENCOUNTER — PATIENT MESSAGE (OUTPATIENT)
Dept: PAIN MANAGEMENT | Age: 58
End: 2023-10-12

## 2023-10-17 NOTE — TELEPHONE ENCOUNTER
From: Ruth Up  To: Dr. Jie Houser: 10/12/2023 10:01 AM EDT  Subject: Change? Hi I noticed that the script for meloxicam was reduced to 3 times a week when I had been taking it everyday. Did Dr Jayme Montano make this change. At one point in the past I had been taking it as needed but for the last several months I went back to the everyday routine.   Thanks

## 2023-10-31 RX ORDER — QUETIAPINE FUMARATE 25 MG/1
TABLET, FILM COATED ORAL
Qty: 60 TABLET | Refills: 1 | OUTPATIENT
Start: 2023-10-31

## 2023-11-06 ENCOUNTER — OFFICE VISIT (OUTPATIENT)
Dept: PAIN MANAGEMENT | Age: 58
End: 2023-11-06

## 2023-11-06 VITALS
HEART RATE: 58 BPM | SYSTOLIC BLOOD PRESSURE: 154 MMHG | BODY MASS INDEX: 27.84 KG/M2 | DIASTOLIC BLOOD PRESSURE: 72 MMHG | OXYGEN SATURATION: 98 % | WEIGHT: 194 LBS

## 2023-11-06 DIAGNOSIS — G89.4 CHRONIC PAIN SYNDROME: ICD-10-CM

## 2023-11-06 DIAGNOSIS — S33.5XXA LOW BACK SPRAIN, INITIAL ENCOUNTER: ICD-10-CM

## 2023-11-06 DIAGNOSIS — S33.5XXA SPRAIN OF LUMBAR REGION, INITIAL ENCOUNTER: ICD-10-CM

## 2023-11-06 DIAGNOSIS — S33.5XXA SPRAIN OF LOW BACK, INITIAL ENCOUNTER: ICD-10-CM

## 2023-11-06 DIAGNOSIS — S33.5XXA LUMBAR SPRAIN, INITIAL ENCOUNTER: ICD-10-CM

## 2023-11-06 DIAGNOSIS — M51.27 PROLAPSED LUMBOSACRAL INTERVERTEBRAL DISC: ICD-10-CM

## 2023-11-06 RX ORDER — MELOXICAM 15 MG/1
15 TABLET ORAL DAILY PRN
Qty: 30 TABLET | Refills: 0
Start: 2023-11-06

## 2023-11-06 RX ORDER — MORPHINE SULFATE 30 MG/1
30 TABLET, FILM COATED, EXTENDED RELEASE ORAL
Qty: 28 TABLET | Refills: 0 | Status: SHIPPED | OUTPATIENT
Start: 2023-11-06 | End: 2023-12-04

## 2023-11-06 RX ORDER — HYDROCODONE BITARTRATE AND ACETAMINOPHEN 7.5; 325 MG/1; MG/1
1 TABLET ORAL EVERY 6 HOURS PRN
Qty: 84 TABLET | Refills: 0 | Status: SHIPPED | OUTPATIENT
Start: 2023-11-06 | End: 2023-12-04

## 2023-11-09 NOTE — PROGRESS NOTES
[facility]  1965  [id number]    HISTORY OF PRESENT ILLNESS:  Mr. Yaniv Gama is a 59 y.o. male returns for a follow up visit for multiple medical problems. His  presenting problems are   1. BWC-Prolapsed lumbosacral intervertebral disc    2. BWC-Low back sprain, initial encounter    3. BWC-Sprain of lumbar region, initial encounter    4. BWC Lumbar sprain, initial encounter    5. BWC-Sprain of low back, initial encounter    6. Chronic pain syndrome    . As per information/history obtained from the PADT(patient assessment and documentation tool) -  He complains of pain in the mid back, lower back, and knees Left with radiation to the buttocks, hips Left, and upper leg Left He rates the pain 8/10 and describes it as sharp, aching, burning. Pain is made worse by: movement, walking, standing, bending, lifting. Current treatment regimen has helped relieve about 50% of the pain. He denies side effects from the current pain regimen. Patient reports that since last follow up visit the physical functioning is worse, family/social relationships are unchanged, mood is worse sleep patterns are unchanged. Mr. Yaniv Gama states that since starting the treatment with the current regimen the  overall functioning  in the above aspects is  worse,Patient denies neurological bowel or bladder. Patient denies misusing/abusing his narcotic pain medications or using any illegal drugs. There are No indicators for possible drug abuse, addiction or diversion problems. Upon obtaining the medical history from Mr. Yaniv Gama regarding today's office visit for his presenting problems, patient states he has been doing fair, his pain is worse over the last few weeks. Mr. Yaniv Gama states his Mobic was decrease to 3 times a week and is having increase since, he wants the dose increased. He states he tried over the counter NSAID's but not helpful. Patient states his sleep is fair. Has fairly normal sleep latency.  Averages about 4-6 hours of sleep a

## 2023-11-18 NOTE — ADDENDUM NOTE
Addended by: Anil Prasad on: 2/23/2021 04:36 PM     Modules accepted: Orders
Addended by: Falguni Joseph on: 2/23/2021 04:28 PM     Modules accepted: Orders
16-Nov-2023 22:45

## 2023-12-01 ENCOUNTER — OFFICE VISIT (OUTPATIENT)
Dept: PAIN MANAGEMENT | Age: 58
End: 2023-12-01

## 2023-12-01 VITALS
WEIGHT: 200 LBS | BODY MASS INDEX: 28.7 KG/M2 | OXYGEN SATURATION: 99 % | DIASTOLIC BLOOD PRESSURE: 97 MMHG | HEART RATE: 57 BPM | SYSTOLIC BLOOD PRESSURE: 153 MMHG

## 2023-12-01 DIAGNOSIS — S33.5XXA SPRAIN OF LOW BACK, INITIAL ENCOUNTER: ICD-10-CM

## 2023-12-01 DIAGNOSIS — M51.27 PROLAPSED LUMBOSACRAL INTERVERTEBRAL DISC: ICD-10-CM

## 2023-12-01 DIAGNOSIS — S33.5XXA LOW BACK SPRAIN, INITIAL ENCOUNTER: ICD-10-CM

## 2023-12-01 DIAGNOSIS — S33.5XXA SPRAIN OF LUMBAR REGION, INITIAL ENCOUNTER: ICD-10-CM

## 2023-12-01 RX ORDER — HYDROCODONE BITARTRATE AND ACETAMINOPHEN 7.5; 325 MG/1; MG/1
1 TABLET ORAL EVERY 6 HOURS PRN
Qty: 90 TABLET | Refills: 0 | Status: SHIPPED | OUTPATIENT
Start: 2023-12-01 | End: 2023-12-31

## 2023-12-01 RX ORDER — MAGNESIUM OXIDE 400 MG/1
400 TABLET ORAL 2 TIMES DAILY
Qty: 60 TABLET | Refills: 1 | Status: SHIPPED | OUTPATIENT
Start: 2023-12-01

## 2023-12-01 RX ORDER — MELOXICAM 15 MG/1
15 TABLET ORAL DAILY PRN
Qty: 30 TABLET | Refills: 0 | Status: SHIPPED | OUTPATIENT
Start: 2023-12-01

## 2023-12-01 RX ORDER — DULOXETIN HYDROCHLORIDE 60 MG/1
60 CAPSULE, DELAYED RELEASE ORAL DAILY
Qty: 30 CAPSULE | Refills: 1 | Status: SHIPPED | OUTPATIENT
Start: 2023-12-01

## 2023-12-01 RX ORDER — MORPHINE SULFATE 30 MG/1
30 TABLET, FILM COATED, EXTENDED RELEASE ORAL
Qty: 30 TABLET | Refills: 0 | Status: SHIPPED | OUTPATIENT
Start: 2023-12-01 | End: 2023-12-31

## 2023-12-01 RX ORDER — QUETIAPINE FUMARATE 25 MG/1
25-50 TABLET, FILM COATED ORAL NIGHTLY
Qty: 60 TABLET | Refills: 1 | Status: SHIPPED | OUTPATIENT
Start: 2023-12-01

## 2023-12-01 NOTE — PROGRESS NOTES
should call the office. While transcribing every attempt was made to maintain the accuracy of the note in terms of it's contents,there may have been some errors made inadvertently. Thank you for allowing me to participate in the care of this patient.     Moiz Solomon MD.    Cc: Saira Goldman MD

## 2024-01-04 ENCOUNTER — OFFICE VISIT (OUTPATIENT)
Dept: PAIN MANAGEMENT | Age: 59
End: 2024-01-04

## 2024-01-04 VITALS
WEIGHT: 196 LBS | SYSTOLIC BLOOD PRESSURE: 144 MMHG | HEART RATE: 70 BPM | OXYGEN SATURATION: 98 % | DIASTOLIC BLOOD PRESSURE: 76 MMHG | BODY MASS INDEX: 28.12 KG/M2

## 2024-01-04 DIAGNOSIS — S33.5XXA SPRAIN OF LOW BACK, INITIAL ENCOUNTER: ICD-10-CM

## 2024-01-04 DIAGNOSIS — M51.27 PROLAPSED LUMBOSACRAL INTERVERTEBRAL DISC: ICD-10-CM

## 2024-01-04 DIAGNOSIS — S33.5XXA LUMBAR SPRAIN, INITIAL ENCOUNTER: ICD-10-CM

## 2024-01-04 DIAGNOSIS — S33.5XXA LOW BACK SPRAIN, INITIAL ENCOUNTER: ICD-10-CM

## 2024-01-04 DIAGNOSIS — G89.4 CHRONIC PAIN SYNDROME: ICD-10-CM

## 2024-01-04 DIAGNOSIS — S33.5XXA SPRAIN OF LUMBAR REGION, INITIAL ENCOUNTER: ICD-10-CM

## 2024-01-04 RX ORDER — MORPHINE SULFATE 30 MG/1
30 TABLET, FILM COATED, EXTENDED RELEASE ORAL
Qty: 28 TABLET | Refills: 0 | Status: SHIPPED | OUTPATIENT
Start: 2024-01-04 | End: 2024-02-01

## 2024-01-04 RX ORDER — HYDROCODONE BITARTRATE AND ACETAMINOPHEN 7.5; 325 MG/1; MG/1
1 TABLET ORAL EVERY 6 HOURS PRN
Qty: 84 TABLET | Refills: 0 | Status: SHIPPED | OUTPATIENT
Start: 2024-01-04 | End: 2024-02-01

## 2024-01-05 NOTE — PROGRESS NOTES
Mobic   -Continue with Cymbalta 60 mg   -CBT techniques for chronic pain to help with:  -Reducing the negative impact of pain on daily life  -Improving physical and emotional functioning  -Increasing effective coping skills for managing pain  -Reducing pain intensity  Employing techniques of  - Exercise, pacing- relaxation therapies such as biofeedback, mindfulness based stress reduction, imagery, cognitive restructuring, behavioral activation and problem solving   -he was advised proper sleep hygiene-told to avoid:use of caffeine or other stimulants after noon, alcohol use near bedtime, long or frequent naps during the day, erratic sleep schedule, heavy meals near bedtime, vigorous exercise near bedtime and use of electronic devices near bedtime   -Continue with Seroquel 25 mg 1-2 at night   -ORT, SOAPP and Control substance agreement signed and reveiwewd     Medications/orders associated with this visit:  Current Outpatient Medications   Medication Sig Dispense Refill    HYDROcodone-acetaminophen (NORCO) 7.5-325 MG per tablet Take 1 tablet by mouth every 6 hours as needed for Pain (max 3 per day) for up to 28 days. 84 tablet 0    morphine (MS CONTIN) 30 MG extended release tablet Take 1 tablet by mouth daily for 28 days. 28 tablet 0    DULoxetine (CYMBALTA) 60 MG extended release capsule Take 1 capsule by mouth daily 30 capsule 1    magnesium oxide (MAG-OX) 400 MG tablet Take 1 tablet by mouth 2 times daily 60 tablet 1    meloxicam (MOBIC) 15 MG tablet Take 1 tablet by mouth daily as needed for Pain 30 tablet 0    QUEtiapine (SEROQUEL) 25 MG tablet Take 1-2 tablets by mouth nightly 60 tablet 1    NOVOLOG 100 UNIT/ML injection continuous. Insulin pump averages 50-80 units daily      aspirin 325 MG tablet Take 1 tablet by mouth daily      carvedilol (COREG) 6.25 MG tablet Take 1 tablet by mouth 2 times daily (with meals)      atorvastatin (LIPITOR) 80 MG tablet Take 1 tablet by mouth nightly      clopidogrel

## 2024-01-17 DIAGNOSIS — S33.5XXA LOW BACK SPRAIN, INITIAL ENCOUNTER: ICD-10-CM

## 2024-01-17 DIAGNOSIS — S33.5XXA SPRAIN OF LOW BACK, INITIAL ENCOUNTER: ICD-10-CM

## 2024-01-17 DIAGNOSIS — S33.5XXA SPRAIN OF LUMBAR REGION, INITIAL ENCOUNTER: ICD-10-CM

## 2024-01-17 DIAGNOSIS — M51.27 PROLAPSED LUMBOSACRAL INTERVERTEBRAL DISC: ICD-10-CM

## 2024-02-01 ENCOUNTER — OFFICE VISIT (OUTPATIENT)
Dept: PAIN MANAGEMENT | Age: 59
End: 2024-02-01

## 2024-02-01 VITALS
DIASTOLIC BLOOD PRESSURE: 83 MMHG | SYSTOLIC BLOOD PRESSURE: 123 MMHG | HEART RATE: 61 BPM | WEIGHT: 200 LBS | BODY MASS INDEX: 28.7 KG/M2

## 2024-02-01 DIAGNOSIS — S33.5XXA SPRAIN OF LOW BACK, INITIAL ENCOUNTER: ICD-10-CM

## 2024-02-01 DIAGNOSIS — M51.27 PROLAPSED LUMBOSACRAL INTERVERTEBRAL DISC: ICD-10-CM

## 2024-02-01 DIAGNOSIS — S33.5XXA LOW BACK SPRAIN, INITIAL ENCOUNTER: ICD-10-CM

## 2024-02-01 DIAGNOSIS — S33.5XXA SPRAIN OF LUMBAR REGION, INITIAL ENCOUNTER: ICD-10-CM

## 2024-02-01 DIAGNOSIS — S33.5XXA LUMBAR SPRAIN, INITIAL ENCOUNTER: ICD-10-CM

## 2024-02-01 RX ORDER — DULOXETIN HYDROCHLORIDE 60 MG/1
60 CAPSULE, DELAYED RELEASE ORAL DAILY
Qty: 30 CAPSULE | Refills: 1 | Status: SHIPPED | OUTPATIENT
Start: 2024-02-01

## 2024-02-01 RX ORDER — MAGNESIUM OXIDE 400 MG/1
400 TABLET ORAL 2 TIMES DAILY
Qty: 60 TABLET | Refills: 1 | Status: SHIPPED | OUTPATIENT
Start: 2024-02-01

## 2024-02-01 RX ORDER — HYDROCODONE BITARTRATE AND ACETAMINOPHEN 7.5; 325 MG/1; MG/1
1 TABLET ORAL EVERY 6 HOURS PRN
Qty: 84 TABLET | Refills: 0 | Status: SHIPPED | OUTPATIENT
Start: 2024-02-01 | End: 2024-02-29

## 2024-02-01 RX ORDER — MELOXICAM 15 MG/1
15 TABLET ORAL DAILY PRN
Qty: 30 TABLET | Refills: 0 | Status: SHIPPED | OUTPATIENT
Start: 2024-02-01

## 2024-02-01 RX ORDER — QUETIAPINE FUMARATE 25 MG/1
25-50 TABLET, FILM COATED ORAL NIGHTLY
Qty: 60 TABLET | Refills: 1 | Status: SHIPPED | OUTPATIENT
Start: 2024-02-01

## 2024-02-01 RX ORDER — MELOXICAM 15 MG/1
15 TABLET ORAL DAILY PRN
Qty: 30 TABLET | Refills: 0 | OUTPATIENT
Start: 2024-02-01

## 2024-02-01 RX ORDER — MORPHINE SULFATE 30 MG/1
30 TABLET, FILM COATED, EXTENDED RELEASE ORAL
Qty: 28 TABLET | Refills: 0 | Status: SHIPPED | OUTPATIENT
Start: 2024-02-01 | End: 2024-02-29

## 2024-02-02 NOTE — PROGRESS NOTES
325 MG tablet Take 1 tablet by mouth daily      carvedilol (COREG) 6.25 MG tablet Take 1 tablet by mouth 2 times daily (with meals)      atorvastatin (LIPITOR) 80 MG tablet Take 1 tablet by mouth nightly      clopidogrel (PLAVIX) 75 MG tablet Take 1 tablet by mouth daily      lisinopril (PRINIVIL;ZESTRIL) 10 MG tablet Take 1 tablet by mouth daily      HYDROcodone-acetaminophen (NORCO) 7.5-325 MG per tablet Take 1 tablet by mouth every 6 hours as needed for Pain (max 3 per day) for up to 28 days. 84 tablet 0    morphine (MS CONTIN) 30 MG extended release tablet Take 1 tablet by mouth daily for 28 days. 28 tablet 0    DULoxetine (CYMBALTA) 60 MG extended release capsule Take 1 capsule by mouth daily 30 capsule 1    magnesium oxide (MAG-OX) 400 MG tablet Take 1 tablet by mouth 2 times daily 60 tablet 1    meloxicam (MOBIC) 15 MG tablet Take 1 tablet by mouth daily as needed for Pain 30 tablet 0    QUEtiapine (SEROQUEL) 25 MG tablet Take 1-2 tablets by mouth nightly 60 tablet 1     No facility-administered medications prior to visit.        REVIEW OF SYSTEMS:    Respiratory: Negative for apnea, chest tightness and shortness of breath or change in baseline breathing.      PHYSICAL EXAM:   Nursing note and vitals reviewed. /83   Pulse 61   Wt 90.7 kg (200 lb)   BMI 28.70 kg/m²   Constitutional: He appears well-developed and well-nourished. No acute distress.   Cardiovascular: Normal rate, regular rhythm, normal heart sounds, and does not have murmur.     Pulmonary/Chest: Effort normal. No respiratory distress. He does not have wheezes in the lung fields. He has no rales.     Musculo-Skeletal/Extremities:Gait is normal, assistive devices use: none.    He exhibits edema: none, and no tenderness.   Neurological/Psychiatric:He is alert and oriented to person, place, and time. Coordination is  normal.  His mood isAppropriate and affect is Neutral/Euthymic(normal) .    IMPRESSION:   1. BWC-Prolapsed lumbosacral

## 2024-02-29 ENCOUNTER — OFFICE VISIT (OUTPATIENT)
Dept: PAIN MANAGEMENT | Age: 59
End: 2024-02-29

## 2024-02-29 VITALS
HEART RATE: 61 BPM | SYSTOLIC BLOOD PRESSURE: 122 MMHG | OXYGEN SATURATION: 96 % | BODY MASS INDEX: 28.55 KG/M2 | DIASTOLIC BLOOD PRESSURE: 63 MMHG | WEIGHT: 199 LBS

## 2024-02-29 DIAGNOSIS — S33.5XXA LOW BACK SPRAIN, INITIAL ENCOUNTER: ICD-10-CM

## 2024-02-29 DIAGNOSIS — S33.5XXA SPRAIN OF LOW BACK, INITIAL ENCOUNTER: ICD-10-CM

## 2024-02-29 DIAGNOSIS — S33.5XXA SPRAIN OF LUMBAR REGION, INITIAL ENCOUNTER: ICD-10-CM

## 2024-02-29 DIAGNOSIS — S33.5XXA LUMBAR SPRAIN, INITIAL ENCOUNTER: ICD-10-CM

## 2024-02-29 DIAGNOSIS — M51.27 PROLAPSED LUMBOSACRAL INTERVERTEBRAL DISC: ICD-10-CM

## 2024-02-29 RX ORDER — HYDROCODONE BITARTRATE AND ACETAMINOPHEN 7.5; 325 MG/1; MG/1
1 TABLET ORAL EVERY 6 HOURS PRN
Qty: 84 TABLET | Refills: 0 | Status: SHIPPED | OUTPATIENT
Start: 2024-02-29 | End: 2024-03-28

## 2024-02-29 RX ORDER — MELOXICAM 15 MG/1
15 TABLET ORAL DAILY PRN
Qty: 30 TABLET | Refills: 0 | Status: SHIPPED | OUTPATIENT
Start: 2024-02-29

## 2024-02-29 RX ORDER — MORPHINE SULFATE 30 MG/1
30 TABLET, FILM COATED, EXTENDED RELEASE ORAL
Qty: 28 TABLET | Refills: 0 | Status: SHIPPED | OUTPATIENT
Start: 2024-02-29 | End: 2024-03-28

## 2024-02-29 NOTE — PROGRESS NOTES
Juan M Mota  1965  [id number]    HISTORY OF PRESENT ILLNESS:  Mr. Mota is a 58 y.o. male returns for a follow up visit for multiple medical problems.  His  presenting problems are   1. BWC-Prolapsed lumbosacral intervertebral disc    2. BWC-Sprain of lumbar region, initial encounter    3. BWC-Sprain of low back, initial encounter    4. BWC Lumbar sprain, initial encounter    5. BWC-Low back sprain, initial encounter    .    As per information/history obtained from the PADT(patient assessment and documentation tool) -  He complains of pain in the lower back with radiation to the buttocks, hips Left, upper leg Left, and knees Left He rates the pain 7/10 and describes it as sharp, aching, burning.  Pain is made worse by: movement, walking, standing, bending, lifting.  Current treatment regimen has helped relieve about 60% of the pain.  He denies side effects from the current pain regimen.   Patient reports that since last follow up visit the physical functioning is worse, family/social relationships are unchanged, mood is unchanged sleep patterns are worse.  Mr. Mota states that since starting the treatment with the current regimen the  overall functioning  in the above aspects is  better,Patient denies neurological bowel or bladder. Patient denies misusing/abusing his narcotic pain medications or using any illegal drugs.  There are No indicators for possible drug abuse, addiction or diversion problems.   Upon obtaining the medical history from Mr. Mota regarding today's office visit for his presenting problems, patient states he has been doing fair, he is managing with the medications. Mr. Mota mentions he is using Ms Contin along with Mobic PRN but daily lately. Patient states his wrists have been hurting, left is worse than the right. Patient states his sleep is fair. Has fairly normal sleep latency. Averages about 4-6 hours of sleep a night. Denies any signs of sleep apnea. Feels somewhat rested in the

## 2024-03-28 ENCOUNTER — OFFICE VISIT (OUTPATIENT)
Dept: PAIN MANAGEMENT | Age: 59
End: 2024-03-28

## 2024-03-28 VITALS
SYSTOLIC BLOOD PRESSURE: 131 MMHG | DIASTOLIC BLOOD PRESSURE: 77 MMHG | HEART RATE: 60 BPM | BODY MASS INDEX: 28.7 KG/M2 | OXYGEN SATURATION: 99 % | WEIGHT: 200 LBS

## 2024-03-28 DIAGNOSIS — S33.5XXA SPRAIN OF LOW BACK, INITIAL ENCOUNTER: ICD-10-CM

## 2024-03-28 DIAGNOSIS — S33.5XXA LOW BACK SPRAIN, INITIAL ENCOUNTER: ICD-10-CM

## 2024-03-28 DIAGNOSIS — S33.5XXA SPRAIN OF LUMBAR REGION, INITIAL ENCOUNTER: ICD-10-CM

## 2024-03-28 DIAGNOSIS — M51.27 PROLAPSED LUMBOSACRAL INTERVERTEBRAL DISC: ICD-10-CM

## 2024-03-28 RX ORDER — MELOXICAM 15 MG/1
15 TABLET ORAL DAILY PRN
Qty: 30 TABLET | Refills: 0 | Status: SHIPPED | OUTPATIENT
Start: 2024-03-28

## 2024-03-28 RX ORDER — MORPHINE SULFATE 30 MG/1
30 TABLET, FILM COATED, EXTENDED RELEASE ORAL
Qty: 28 TABLET | Refills: 0 | Status: SHIPPED | OUTPATIENT
Start: 2024-03-28 | End: 2024-04-25

## 2024-03-28 RX ORDER — HYDROCODONE BITARTRATE AND ACETAMINOPHEN 7.5; 325 MG/1; MG/1
1 TABLET ORAL EVERY 6 HOURS PRN
Qty: 84 TABLET | Refills: 0 | Status: SHIPPED | OUTPATIENT
Start: 2024-03-28 | End: 2024-04-25

## 2024-03-28 NOTE — PROGRESS NOTES
Juan M Mota  1965  [id number]    HISTORY OF PRESENT ILLNESS:  Mr. Mota is a 58 y.o. male returns for a follow up visit for multiple medical problems.  His  presenting problems are   1. BWC-Prolapsed lumbosacral intervertebral disc    2. BWC Lumbar sprain, initial encounter    3. BWC-Sprain of lumbar region, initial encounter    4. BWC-Low back sprain, initial encounter    5. BWC-Sprain of low back, initial encounter    6. Chronic pain syndrome    .    As per information/history obtained from the PADT(patient assessment and documentation tool) -  He complains of pain in the lower back with radiation to the buttocks, hips Left, upper leg Left, and knees Left He rates the pain 7/10 and describes it as sharp, aching, burning, numbness, pins and needles.  Pain is made worse by: movement, walking, standing, sitting, bending, lifting.  Current treatment regimen has helped relieve about 60% of the pain.  He denies side effects from the current pain regimen.   Patient reports that since last follow up visit the physical functioning is unchanged, family/social relationships are unchanged, mood is unchanged sleep patterns are unchanged.  Mr. Mota states that since starting the treatment with the current regimen the  overall functioning  in the above aspects is  better,Patient denies neurological bowel or bladder. Patient denies misusing/abusing his narcotic pain medications or using any illegal drugs.  There are No indicators for possible drug abuse, addiction or diversion problems.  Upon obtaining the medical history from Mr. Mota regarding today's office visit for his presenting problems, Patient reports he has been doing fair, states he saw ortho for his wrist and had got a injection. He says it helped some. He mentions he's working full time. He reports he using Mobic along with other adjuvants. He states he's on Ms.contin with Percocet for btp.       ALLERGIES: Patients list of allergies were reviewed

## 2024-04-22 DIAGNOSIS — S33.5XXA SPRAIN OF LOW BACK, INITIAL ENCOUNTER: ICD-10-CM

## 2024-04-22 DIAGNOSIS — S33.5XXA LOW BACK SPRAIN, INITIAL ENCOUNTER: ICD-10-CM

## 2024-04-22 DIAGNOSIS — S33.5XXA SPRAIN OF LUMBAR REGION, INITIAL ENCOUNTER: ICD-10-CM

## 2024-04-22 DIAGNOSIS — M51.27 PROLAPSED LUMBOSACRAL INTERVERTEBRAL DISC: ICD-10-CM

## 2024-04-25 ENCOUNTER — OFFICE VISIT (OUTPATIENT)
Dept: PAIN MANAGEMENT | Age: 59
End: 2024-04-25

## 2024-04-25 VITALS
SYSTOLIC BLOOD PRESSURE: 120 MMHG | HEART RATE: 60 BPM | WEIGHT: 203 LBS | OXYGEN SATURATION: 99 % | BODY MASS INDEX: 29.13 KG/M2 | DIASTOLIC BLOOD PRESSURE: 66 MMHG

## 2024-04-25 DIAGNOSIS — M51.27 PROLAPSED LUMBOSACRAL INTERVERTEBRAL DISC: ICD-10-CM

## 2024-04-25 DIAGNOSIS — S33.5XXA LUMBAR SPRAIN, INITIAL ENCOUNTER: ICD-10-CM

## 2024-04-25 DIAGNOSIS — S33.5XXA LOW BACK SPRAIN, INITIAL ENCOUNTER: ICD-10-CM

## 2024-04-25 DIAGNOSIS — S33.5XXA SPRAIN OF LOW BACK, INITIAL ENCOUNTER: ICD-10-CM

## 2024-04-25 DIAGNOSIS — S33.5XXA SPRAIN OF LUMBAR REGION, INITIAL ENCOUNTER: ICD-10-CM

## 2024-04-25 DIAGNOSIS — G89.4 CHRONIC PAIN SYNDROME: ICD-10-CM

## 2024-04-25 RX ORDER — MELOXICAM 15 MG/1
15 TABLET ORAL DAILY PRN
Qty: 30 TABLET | Refills: 0 | Status: SHIPPED | OUTPATIENT
Start: 2024-04-25

## 2024-04-25 RX ORDER — MORPHINE SULFATE 30 MG/1
30 TABLET, FILM COATED, EXTENDED RELEASE ORAL
Qty: 28 TABLET | Refills: 0 | Status: SHIPPED | OUTPATIENT
Start: 2024-04-25 | End: 2024-05-23

## 2024-04-25 RX ORDER — MAGNESIUM OXIDE 400 MG/1
400 TABLET ORAL 2 TIMES DAILY
Qty: 60 TABLET | Refills: 1 | Status: CANCELLED | OUTPATIENT
Start: 2024-04-25

## 2024-04-25 RX ORDER — HYDROCODONE BITARTRATE AND ACETAMINOPHEN 7.5; 325 MG/1; MG/1
1 TABLET ORAL EVERY 6 HOURS PRN
Qty: 84 TABLET | Refills: 0 | Status: SHIPPED | OUTPATIENT
Start: 2024-04-25 | End: 2024-05-23

## 2024-04-25 RX ORDER — DULOXETIN HYDROCHLORIDE 60 MG/1
60 CAPSULE, DELAYED RELEASE ORAL DAILY
Qty: 30 CAPSULE | Refills: 1 | Status: SHIPPED | OUTPATIENT
Start: 2024-04-25

## 2024-04-25 RX ORDER — QUETIAPINE FUMARATE 25 MG/1
25-50 TABLET, FILM COATED ORAL NIGHTLY
Qty: 60 TABLET | Refills: 1 | Status: SHIPPED | OUTPATIENT
Start: 2024-04-25

## 2024-04-25 RX ORDER — QUETIAPINE FUMARATE 25 MG/1
TABLET, FILM COATED ORAL
Qty: 60 TABLET | Refills: 1 | OUTPATIENT
Start: 2024-04-25

## 2024-04-25 NOTE — PROGRESS NOTES
Juan M Mota  1965  [id number]      HISTORY OF PRESENT ILLNESS:  Mr. Mota is a 59 y.o. male returns for a follow up visit for pain management  He has a diagnosis of   1. BWC-Prolapsed lumbosacral intervertebral disc    2. BWC-Sprain of lumbar region, initial encounter    3. BWC-Sprain of low back, initial encounter    4. BWC Lumbar sprain, initial encounter    5. BWC-Low back sprain, initial encounter    6. Chronic pain syndrome    .      As per information/history obtained from the PADT(patient assessment and documentation tool) -  He complains of pain in the neck, upper back, mid back, and lower back with radiation to the shoulders Bilateral, arms Bilateral, buttocks, hips Left, upper leg Left, and knees Left He rates the pain 7/10 and describes it as sharp, aching, burning, numbness.  Pain is made worse by: movement, walking, standing, sitting, bending. He denies any side effects from the current pain regimen. Patient reports that since last follow up visit the physical functioning is worse, family/social relationships are unchanged, mood is worse sleep patterns are unchanged. Mr. Mota states that since starting the treatment with the current regimen the  overall functioning  in the above aspects is  better, Patient denies misusing/abusing his narcotic pain medications or using any illegal drugs.  There are No indicators for possible drug abuse, addiction or diversion problems. Upon obtaining the medical history from Mr. Mota regarding today's office visit for his presenting problems, patient states he has been doing fair, he is managing with his regimen. Mr. Mota complains of his mid back hurting more over the last 2 weeks. He mentions he is using Ms Contin along with Norco for breakthrough pain. Patient denies any side effects. Patient reports he is working full time.       ALLERGIES: Patients list of allergies were reviewed     MEDICATIONS: Mr. Mota list of medications were reviewed.His current

## 2024-04-26 ENCOUNTER — TELEPHONE (OUTPATIENT)
Dept: PAIN MANAGEMENT | Age: 59
End: 2024-04-26

## 2024-04-26 NOTE — TELEPHONE ENCOUNTER
Submitted PA for Morphine Via [facility] Key: [id number] STATUS: APPROVED 4/26/2024-10/23/2024.    [facility] has been notified.   Thank you!

## 2024-04-26 NOTE — TELEPHONE ENCOUNTER
Submitted ([facility]) PA for Morphine Via [facility] Key: [id number] STATUS: PENDING.    Follow up done daily; if no decision with in three days we will refax.  If another three days goes by with no decision will call the insurance for status.

## 2024-04-26 NOTE — TELEPHONE ENCOUNTER
----- Message from Juan M Mota sent at 4/25/2024  3:43 PM EDT -----  Regarding: Awaiting Approval  Contact: [phone]  Hi  Seems my Morphine needs insurance approval. Is there anything I can do on my end or did the pharmacy reach back out to the person there for pre approvals?  Thanks

## 2024-04-30 NOTE — TELEPHONE ENCOUNTER
BWC DENIED. \"Therapeutic Class of Medication is not covered.\"    Documentation uploaded in media.

## 2024-05-23 ENCOUNTER — OFFICE VISIT (OUTPATIENT)
Dept: PAIN MANAGEMENT | Age: 59
End: 2024-05-23

## 2024-05-23 ENCOUNTER — TELEPHONE (OUTPATIENT)
Dept: PAIN MANAGEMENT | Age: 59
End: 2024-05-23

## 2024-05-23 VITALS
HEART RATE: 58 BPM | BODY MASS INDEX: 29.41 KG/M2 | WEIGHT: 205 LBS | SYSTOLIC BLOOD PRESSURE: 116 MMHG | OXYGEN SATURATION: 97 % | DIASTOLIC BLOOD PRESSURE: 61 MMHG

## 2024-05-23 DIAGNOSIS — S33.5XXA LUMBAR SPRAIN, INITIAL ENCOUNTER: ICD-10-CM

## 2024-05-23 DIAGNOSIS — M51.27 PROLAPSED LUMBOSACRAL INTERVERTEBRAL DISC: ICD-10-CM

## 2024-05-23 DIAGNOSIS — S33.5XXA SPRAIN OF LUMBAR REGION, INITIAL ENCOUNTER: ICD-10-CM

## 2024-05-23 DIAGNOSIS — S33.5XXA LOW BACK SPRAIN, INITIAL ENCOUNTER: ICD-10-CM

## 2024-05-23 DIAGNOSIS — S33.5XXA SPRAIN OF LOW BACK, INITIAL ENCOUNTER: ICD-10-CM

## 2024-05-23 RX ORDER — HYDROCODONE BITARTRATE AND ACETAMINOPHEN 7.5; 325 MG/1; MG/1
1 TABLET ORAL EVERY 6 HOURS PRN
Qty: 84 TABLET | Refills: 0 | Status: SHIPPED | OUTPATIENT
Start: 2024-05-23 | End: 2024-06-20

## 2024-05-23 RX ORDER — MORPHINE SULFATE 30 MG/1
30 TABLET, FILM COATED, EXTENDED RELEASE ORAL
Qty: 28 TABLET | Refills: 0 | Status: SHIPPED | OUTPATIENT
Start: 2024-05-23 | End: 2024-06-20

## 2024-05-23 RX ORDER — TRIAMCINOLONE ACETONIDE 40 MG/ML
40 INJECTION, SUSPENSION INTRA-ARTICULAR; INTRAMUSCULAR ONCE
Status: COMPLETED | OUTPATIENT
Start: 2024-05-23 | End: 2024-05-23

## 2024-05-23 RX ADMIN — TRIAMCINOLONE ACETONIDE 40 MG: 40 INJECTION, SUSPENSION INTRA-ARTICULAR; INTRAMUSCULAR at 14:42

## 2024-05-23 NOTE — PROGRESS NOTES
Juan M Mota  1965  [id number]    HISTORY OF PRESENT ILLNESS:  Mr. Mota is a 59 y.o. male returns for a follow up visit for multiple medical problems.  His  presenting problems are   1. BWC-Prolapsed lumbosacral intervertebral disc    2. BWC Lumbar sprain, initial encounter    3. BWC-Sprain of lumbar region, initial encounter    4. BWC-Low back sprain, initial encounter    5. BWC-Sprain of low back, initial encounter    .    As per information/history obtained from the PADT(patient assessment and documentation tool) -  He complains of pain in the lower back with radiation to the buttocks, hips Left, upper leg Left, and knees Left He rates the pain 8/10 and describes it as sharp, aching, burning.  Pain is made worse by: movement, walking, standing, sitting, bending, lifting.  Current treatment regimen has helped relieve about 50% of the pain.  He denies side effects from the current pain regimen.   Patient reports that since last follow up visit the physical functioning is worse, family/social relationships are unchanged, mood is unchanged sleep patterns are unchanged.  Mr. Mota states that since starting the treatment with the current regimen the  overall functioning  in the above aspects is  better,Patient denies neurological bowel or bladder. Patient denies misusing/abusing his narcotic pain medications or using any illegal drugs.  There are No indicators for possible drug abuse, addiction or diversion problems.   Upon obtaining the medical history from Mr. Mota regarding today's office visit for his presenting problems, patient states his back went out a few days ago, he states he has been hurting a lot in the lower back. Mr. Mota states he is using Mobic along with his opioids but not holding the pain. Patient states he sleeps well. Has normal sleep latency. Averages about 5-7 hours of sleep a night. Denies any signs of sleep apnea. Feels rested in the AM. Denies any sleep attacks during the day.

## 2024-06-19 ENCOUNTER — TELEPHONE (OUTPATIENT)
Dept: PAIN MANAGEMENT | Age: 59
End: 2024-06-19

## 2024-06-20 ENCOUNTER — OFFICE VISIT (OUTPATIENT)
Dept: PAIN MANAGEMENT | Age: 59
End: 2024-06-20

## 2024-06-20 VITALS
DIASTOLIC BLOOD PRESSURE: 65 MMHG | HEART RATE: 62 BPM | WEIGHT: 205 LBS | OXYGEN SATURATION: 99 % | BODY MASS INDEX: 29.41 KG/M2 | SYSTOLIC BLOOD PRESSURE: 117 MMHG

## 2024-06-20 DIAGNOSIS — M51.27 PROLAPSED LUMBOSACRAL INTERVERTEBRAL DISC: ICD-10-CM

## 2024-06-20 DIAGNOSIS — S33.5XXA SPRAIN OF LUMBAR REGION, INITIAL ENCOUNTER: ICD-10-CM

## 2024-06-20 DIAGNOSIS — G89.4 CHRONIC PAIN SYNDROME: ICD-10-CM

## 2024-06-20 DIAGNOSIS — S33.5XXA SPRAIN OF LOW BACK, INITIAL ENCOUNTER: ICD-10-CM

## 2024-06-20 DIAGNOSIS — S33.5XXA LOW BACK SPRAIN, INITIAL ENCOUNTER: ICD-10-CM

## 2024-06-20 DIAGNOSIS — S33.5XXA LUMBAR SPRAIN, INITIAL ENCOUNTER: ICD-10-CM

## 2024-06-20 RX ORDER — MORPHINE SULFATE 30 MG/1
30 TABLET, FILM COATED, EXTENDED RELEASE ORAL
Qty: 28 TABLET | Refills: 0 | Status: SHIPPED | OUTPATIENT
Start: 2024-06-20 | End: 2024-07-18

## 2024-06-20 RX ORDER — HYDROCODONE BITARTRATE AND ACETAMINOPHEN 7.5; 325 MG/1; MG/1
1 TABLET ORAL EVERY 6 HOURS PRN
Qty: 84 TABLET | Refills: 0 | Status: SHIPPED | OUTPATIENT
Start: 2024-06-20 | End: 2024-07-18

## 2024-06-20 RX ORDER — MELOXICAM 15 MG/1
15 TABLET ORAL DAILY PRN
Qty: 30 TABLET | Refills: 0 | Status: SHIPPED | OUTPATIENT
Start: 2024-06-20

## 2024-06-20 NOTE — PROGRESS NOTES
Juan M Mota  1965  [id number]      HISTORY OF PRESENT ILLNESS:  Mr. Mota is a 59 y.o. male returns for a follow up visit for pain management  He has a diagnosis of   1. BWC-Prolapsed lumbosacral intervertebral disc    2. BWC-Low back sprain, initial encounter    3. BWC Lumbar sprain, initial encounter    4. BWC-Sprain of low back, initial encounter    5. BWC-Sprain of lumbar region, initial encounter    6. Chronic pain syndrome    .      As per information/history obtained from the PADT(patient assessment and documentation tool) -  He complains of pain in the lower back with radiation to the buttocks, hips Left, upper leg Left, and knees Left He rates the pain 7/10 and describes it as sharp, aching, burning.  Pain is made worse by: movement, walking, standing, sitting, bending, lifting. He denies any side effects from the current pain regimen. Patient reports that since last follow up visit the physical functioning is unchanged, family/social relationships are unchanged, mood is unchanged sleep patterns are unchanged. Mr. Mota states that since starting the treatment with the current regimen the  overall functioning  in the above aspects is  better, Patient denies misusing/abusing his narcotic pain medications or using any illegal drugs.  There are No indicators for possible drug abuse, addiction or diversion problems.   Upon obtaining the medical history from Mr. Mota regarding today's office visit for his presenting problems, patient reports he has been doing fair, managing okay with his work and house chores. He mentions he's working full time. He says he has been using Ms.Contin with Norco for btp. He denies any constipation symptoms. He complains the pain is greater in his back than legs. He states the injection did help a lot.      ALLERGIES: Patients list of allergies were reviewed     MEDICATIONS: Mr. Mota list of medications were reviewed.His current medications are   Outpatient Medications

## 2024-07-18 ENCOUNTER — OFFICE VISIT (OUTPATIENT)
Dept: PAIN MANAGEMENT | Age: 59
End: 2024-07-18

## 2024-07-18 VITALS
OXYGEN SATURATION: 97 % | DIASTOLIC BLOOD PRESSURE: 69 MMHG | WEIGHT: 207 LBS | HEART RATE: 62 BPM | SYSTOLIC BLOOD PRESSURE: 114 MMHG | BODY MASS INDEX: 29.7 KG/M2

## 2024-07-18 DIAGNOSIS — S33.5XXA SPRAIN OF LOW BACK, INITIAL ENCOUNTER: ICD-10-CM

## 2024-07-18 DIAGNOSIS — S33.5XXA LOW BACK SPRAIN, INITIAL ENCOUNTER: ICD-10-CM

## 2024-07-18 DIAGNOSIS — S33.5XXA LUMBAR SPRAIN, INITIAL ENCOUNTER: ICD-10-CM

## 2024-07-18 DIAGNOSIS — G89.4 CHRONIC PAIN SYNDROME: ICD-10-CM

## 2024-07-18 DIAGNOSIS — M51.27 PROLAPSED LUMBOSACRAL INTERVERTEBRAL DISC: ICD-10-CM

## 2024-07-18 DIAGNOSIS — S33.5XXA SPRAIN OF LUMBAR REGION, INITIAL ENCOUNTER: ICD-10-CM

## 2024-07-18 RX ORDER — MORPHINE SULFATE 30 MG/1
30 TABLET, FILM COATED, EXTENDED RELEASE ORAL
Qty: 28 TABLET | Refills: 0 | Status: SHIPPED | OUTPATIENT
Start: 2024-07-18 | End: 2024-08-15

## 2024-07-18 RX ORDER — HYDROCODONE BITARTRATE AND ACETAMINOPHEN 7.5; 325 MG/1; MG/1
1 TABLET ORAL EVERY 6 HOURS PRN
Qty: 84 TABLET | Refills: 0 | Status: SHIPPED | OUTPATIENT
Start: 2024-07-18 | End: 2024-08-15

## 2024-07-18 RX ORDER — MELOXICAM 15 MG/1
15 TABLET ORAL DAILY PRN
Qty: 30 TABLET | Refills: 0 | Status: SHIPPED | OUTPATIENT
Start: 2024-07-18

## 2024-07-18 NOTE — PROGRESS NOTES
facility-administered medications for this visit.       General Goals of current treatment regimen include improvement in pain, restoration of functioning- with focus on improvement in physical performance, general activity, work or disability,emotional distress, health care utilization and  decreased medication consumption.   Will continue to monitor progress towards achieving/maintaining therapeutic goals with special emphasis on  1. Improvement in perceived interfernce  of pain with ADL's. Ability to do home exercises independently. Ability to do household chores indoor and/or outdoor work and social and leisure activities.Improve psychosocial and physical functioning. - he is maintaining his treatment goal with the current regimen.     He was advised against drinking alcohol with the narcotic pain medicines, advised against driving or handling machinery while adjusting the dose of medicines or if having cognitive  issues related to the current medications.Risk of overdose and death, if medicines not taken as prescribed, were also discussed. If the patient develops new symptoms or if the symptoms worsen, the patient should call the office.    Thank you for allowing me to participate in the care of this patient.      Cc: Lina Alves MD

## 2024-08-15 ENCOUNTER — OFFICE VISIT (OUTPATIENT)
Dept: PAIN MANAGEMENT | Age: 59
End: 2024-08-15

## 2024-08-15 VITALS
HEART RATE: 60 BPM | WEIGHT: 208 LBS | DIASTOLIC BLOOD PRESSURE: 74 MMHG | OXYGEN SATURATION: 100 % | BODY MASS INDEX: 29.84 KG/M2 | SYSTOLIC BLOOD PRESSURE: 114 MMHG

## 2024-08-15 DIAGNOSIS — S33.5XXA SPRAIN OF LUMBAR REGION, INITIAL ENCOUNTER: ICD-10-CM

## 2024-08-15 DIAGNOSIS — S33.5XXA SPRAIN OF LOW BACK, INITIAL ENCOUNTER: ICD-10-CM

## 2024-08-15 DIAGNOSIS — S33.5XXA LUMBAR SPRAIN, INITIAL ENCOUNTER: ICD-10-CM

## 2024-08-15 DIAGNOSIS — S33.5XXA LOW BACK SPRAIN, INITIAL ENCOUNTER: ICD-10-CM

## 2024-08-15 DIAGNOSIS — M51.27 PROLAPSED LUMBOSACRAL INTERVERTEBRAL DISC: ICD-10-CM

## 2024-08-15 RX ORDER — HYDROCODONE BITARTRATE AND ACETAMINOPHEN 7.5; 325 MG/1; MG/1
1 TABLET ORAL EVERY 6 HOURS PRN
Qty: 84 TABLET | Refills: 0 | Status: SHIPPED | OUTPATIENT
Start: 2024-08-15 | End: 2024-09-12

## 2024-08-15 RX ORDER — MORPHINE SULFATE 30 MG/1
30 TABLET, FILM COATED, EXTENDED RELEASE ORAL
Qty: 28 TABLET | Refills: 0 | Status: SHIPPED | OUTPATIENT
Start: 2024-08-15 | End: 2024-09-12

## 2024-08-15 RX ORDER — MELOXICAM 15 MG/1
15 TABLET ORAL DAILY PRN
Qty: 30 TABLET | Refills: 0 | Status: SHIPPED | OUTPATIENT
Start: 2024-08-15

## 2024-08-15 RX ORDER — MAGNESIUM OXIDE 400 MG/1
400 TABLET ORAL 2 TIMES DAILY
Qty: 60 TABLET | Refills: 1 | Status: SHIPPED | OUTPATIENT
Start: 2024-08-15

## 2024-08-15 RX ORDER — QUETIAPINE FUMARATE 25 MG/1
25-50 TABLET, FILM COATED ORAL NIGHTLY
Qty: 60 TABLET | Refills: 1 | Status: SHIPPED | OUTPATIENT
Start: 2024-08-15

## 2024-08-15 NOTE — PROGRESS NOTES
tablet by mouth daily      carvedilol (COREG) 6.25 MG tablet Take 1 tablet by mouth 2 times daily (with meals)      atorvastatin (LIPITOR) 80 MG tablet Take 1 tablet by mouth nightly      lisinopril (PRINIVIL;ZESTRIL) 10 MG tablet Take 1 tablet by mouth daily       No current facility-administered medications for this visit.       General Goals of current treatment regimen include improvement in pain, restoration of functioning- with focus on improvement in physical performance, general activity, work or disability,emotional distress, health care utilization and  decreased medication consumption.   Will continue to monitor progress towards achieving/maintaining therapeutic goals with special emphasis on  1. Improvement in perceived interfernce  of pain with ADL's. Ability to do home exercises independently. Ability to do household chores indoor and/or outdoor work and social and leisure activities.Improve psychosocial and physical functioning. - he is maintaining his treatment goal with the current regimen.   2.Ability to focus/concentrate at work and perform the duties required of him at work  Sit through a workday without lower extremity symptoms.  Stand 30-60 minutes without lower extremity symptoms.  Ability to lift up to 10-20 lbs. Ability to go up and down stairs.  Sit 30-60 minutes  Without having to stand up frequently. - he is maintaining/progressing towards his work related goals with the current regimen. - he is maintaining his treatment goal with the current regimen.     He was advised against drinking alcohol with the narcotic pain medicines, advised against driving or handling machinery while adjusting the dose of medicines or if having cognitive  issues related to the current medications.Risk of overdose and death, if medicines not taken as prescribed, were also discussed. If the patient develops new symptoms or if the symptoms worsen, the patient should call the office.    Thank you for allowing me

## 2024-09-12 ENCOUNTER — OFFICE VISIT (OUTPATIENT)
Dept: PAIN MANAGEMENT | Age: 59
End: 2024-09-12

## 2024-09-12 VITALS
SYSTOLIC BLOOD PRESSURE: 129 MMHG | HEART RATE: 77 BPM | WEIGHT: 210 LBS | DIASTOLIC BLOOD PRESSURE: 64 MMHG | OXYGEN SATURATION: 97 % | BODY MASS INDEX: 30.13 KG/M2

## 2024-09-12 DIAGNOSIS — M51.27 PROLAPSED LUMBOSACRAL INTERVERTEBRAL DISC: ICD-10-CM

## 2024-09-12 DIAGNOSIS — S33.5XXA LOW BACK SPRAIN, INITIAL ENCOUNTER: ICD-10-CM

## 2024-09-12 DIAGNOSIS — S33.5XXA SPRAIN OF LOW BACK, INITIAL ENCOUNTER: ICD-10-CM

## 2024-09-12 DIAGNOSIS — S33.5XXA SPRAIN OF LUMBAR REGION, INITIAL ENCOUNTER: ICD-10-CM

## 2024-09-12 DIAGNOSIS — S33.5XXA LUMBAR SPRAIN, INITIAL ENCOUNTER: ICD-10-CM

## 2024-09-12 RX ORDER — HYDROCODONE BITARTRATE AND ACETAMINOPHEN 7.5; 325 MG/1; MG/1
1 TABLET ORAL EVERY 6 HOURS PRN
Qty: 84 TABLET | Refills: 0 | Status: SHIPPED | OUTPATIENT
Start: 2024-09-12 | End: 2024-10-10

## 2024-09-12 RX ORDER — MORPHINE SULFATE 30 MG/1
30 TABLET, FILM COATED, EXTENDED RELEASE ORAL
Qty: 28 TABLET | Refills: 0 | Status: SHIPPED | OUTPATIENT
Start: 2024-09-12 | End: 2024-10-10

## 2024-09-12 RX ORDER — MELOXICAM 15 MG/1
15 TABLET ORAL DAILY PRN
Qty: 30 TABLET | Refills: 0 | Status: SHIPPED | OUTPATIENT
Start: 2024-09-12

## 2024-10-08 DIAGNOSIS — S33.5XXA SPRAIN OF LUMBAR REGION, INITIAL ENCOUNTER: ICD-10-CM

## 2024-10-08 DIAGNOSIS — S33.5XXA SPRAIN OF LOW BACK, INITIAL ENCOUNTER: ICD-10-CM

## 2024-10-08 DIAGNOSIS — S33.5XXA LOW BACK SPRAIN, INITIAL ENCOUNTER: ICD-10-CM

## 2024-10-08 DIAGNOSIS — S33.5XXA LUMBAR SPRAIN, INITIAL ENCOUNTER: ICD-10-CM

## 2024-10-08 DIAGNOSIS — M51.27 PROLAPSED LUMBOSACRAL INTERVERTEBRAL DISC: ICD-10-CM

## 2024-10-10 ENCOUNTER — TELEPHONE (OUTPATIENT)
Dept: PAIN MANAGEMENT | Age: 59
End: 2024-10-10

## 2024-10-10 ENCOUNTER — OFFICE VISIT (OUTPATIENT)
Dept: PAIN MANAGEMENT | Age: 59
End: 2024-10-10

## 2024-10-10 VITALS
BODY MASS INDEX: 30.56 KG/M2 | HEART RATE: 55 BPM | SYSTOLIC BLOOD PRESSURE: 125 MMHG | OXYGEN SATURATION: 98 % | DIASTOLIC BLOOD PRESSURE: 67 MMHG | WEIGHT: 213 LBS

## 2024-10-10 DIAGNOSIS — M51.27 PROLAPSED LUMBOSACRAL INTERVERTEBRAL DISC: ICD-10-CM

## 2024-10-10 DIAGNOSIS — S33.5XXA SPRAIN OF LUMBAR REGION, INITIAL ENCOUNTER: ICD-10-CM

## 2024-10-10 DIAGNOSIS — S33.5XXA SPRAIN OF LOW BACK, INITIAL ENCOUNTER: ICD-10-CM

## 2024-10-10 DIAGNOSIS — S33.5XXA LUMBAR SPRAIN, INITIAL ENCOUNTER: ICD-10-CM

## 2024-10-10 DIAGNOSIS — G89.4 CHRONIC PAIN SYNDROME: ICD-10-CM

## 2024-10-10 DIAGNOSIS — S33.5XXA LOW BACK SPRAIN, INITIAL ENCOUNTER: ICD-10-CM

## 2024-10-10 RX ORDER — MORPHINE SULFATE 30 MG/1
30 TABLET, FILM COATED, EXTENDED RELEASE ORAL
Qty: 28 TABLET | Refills: 0 | Status: SHIPPED | OUTPATIENT
Start: 2024-10-10 | End: 2024-11-07

## 2024-10-10 RX ORDER — MELOXICAM 15 MG/1
15 TABLET ORAL DAILY PRN
Qty: 30 TABLET | Refills: 0 | Status: SHIPPED | OUTPATIENT
Start: 2024-10-10

## 2024-10-10 RX ORDER — MELOXICAM 15 MG/1
15 TABLET ORAL DAILY PRN
Qty: 30 TABLET | Refills: 0 | OUTPATIENT
Start: 2024-10-10

## 2024-10-10 RX ORDER — HYDROCODONE BITARTRATE AND ACETAMINOPHEN 7.5; 325 MG/1; MG/1
1 TABLET ORAL EVERY 6 HOURS PRN
Qty: 84 TABLET | Refills: 0 | Status: SHIPPED | OUTPATIENT
Start: 2024-10-10 | End: 2024-11-07

## 2024-10-10 NOTE — PROGRESS NOTES
atorvastatin (LIPITOR) 80 MG tablet Take 1 tablet by mouth nightly      lisinopril (PRINIVIL;ZESTRIL) 10 MG tablet Take 1 tablet by mouth daily       No current facility-administered medications for this visit.       General Goals of current treatment regimen include improvement in pain, restoration of functioning- with focus on improvement in physical performance, general activity, work or disability,emotional distress, health care utilization and  decreased medication consumption.   Will continue to monitor progress towards achieving/maintaining therapeutic goals with special emphasis on  1. Improvement in perceived interfernce  of pain with ADL's. Ability to do home exercises independently. Ability to do household chores indoor and/or outdoor work and social and leisure activities.Improve psychosocial and physical functioning. - he is maintaining his treatment goal with the current regimen.      2. Ability to focus/concentrate at work and perform the duties required of him at work  Sit through a workday without lower extremity symptoms.  Stand 30-60 minutes without lower extremity symptoms.  Ability to lift up to 10-20 lbs. Ability to go up and down stairs.  Sit 30-60 minutes  Without having to stand up frequently. - he is maintaining/progressing towards his work related goals with the current regimen.     He was advised against drinking alcohol with the narcotic pain medicines, advised against driving or handling machinery while adjusting the dose of medicines or if having cognitive  issues related to the current medications.Risk of overdose and death, if medicines not taken as prescribed, were also discussed. If the patient develops new symptoms or if the symptoms worsen, the patient should call the office.    Thank you for allowing me to participate in the care of this patient.      Cc: , Lina Caicedo MD

## 2024-11-07 ENCOUNTER — OFFICE VISIT (OUTPATIENT)
Dept: PAIN MANAGEMENT | Age: 59
End: 2024-11-07

## 2024-11-07 VITALS
BODY MASS INDEX: 30.85 KG/M2 | DIASTOLIC BLOOD PRESSURE: 73 MMHG | HEART RATE: 60 BPM | SYSTOLIC BLOOD PRESSURE: 127 MMHG | WEIGHT: 215 LBS | OXYGEN SATURATION: 97 %

## 2024-11-07 DIAGNOSIS — S33.5XXA SPRAIN OF LUMBAR REGION, INITIAL ENCOUNTER: ICD-10-CM

## 2024-11-07 DIAGNOSIS — S33.5XXA SPRAIN OF LOW BACK, INITIAL ENCOUNTER: ICD-10-CM

## 2024-11-07 DIAGNOSIS — S33.5XXA LOW BACK SPRAIN, INITIAL ENCOUNTER: ICD-10-CM

## 2024-11-07 DIAGNOSIS — G89.4 CHRONIC PAIN SYNDROME: ICD-10-CM

## 2024-11-07 DIAGNOSIS — S33.5XXA LUMBAR SPRAIN, INITIAL ENCOUNTER: ICD-10-CM

## 2024-11-07 DIAGNOSIS — M51.27 PROLAPSED LUMBOSACRAL INTERVERTEBRAL DISC: ICD-10-CM

## 2024-11-07 RX ORDER — MORPHINE SULFATE 30 MG/1
30 TABLET, FILM COATED, EXTENDED RELEASE ORAL
Qty: 28 TABLET | Refills: 0 | Status: SHIPPED | OUTPATIENT
Start: 2024-11-07 | End: 2024-12-05

## 2024-11-07 RX ORDER — HYDROCODONE BITARTRATE AND ACETAMINOPHEN 7.5; 325 MG/1; MG/1
1 TABLET ORAL EVERY 6 HOURS PRN
Qty: 84 TABLET | Refills: 0 | Status: SHIPPED | OUTPATIENT
Start: 2024-11-07 | End: 2024-12-05

## 2024-11-07 NOTE — PROGRESS NOTES
Juan M Mota  1965  [id number]      HISTORY OF PRESENT ILLNESS:  Mr. Mota is a 59 y.o. male returns for a follow up visit for pain management  He has a diagnosis of   1. BWC-Prolapsed lumbosacral intervertebral disc    2. BWC Lumbar sprain, initial encounter    3. BWC-Sprain of lumbar region, initial encounter    4. BWC-Low back sprain, initial encounter    5. BWC-Sprain of low back, initial encounter    6. Chronic pain syndrome    .      As per information/history obtained from the PADT(patient assessment and documentation tool) -  He complains of pain in the lower back with radiation to the buttocks, hips Bilateral, upper leg Bilateral, knees Bilateral, lower leg Bilateral, ankles Bilateral, and feet Bilateral He rates the pain 8/10 and describes it as sharp, aching, burning.  Pain is made worse by: movement, walking, standing, sitting, bending, lifting. He denies any side effects from the current pain regimen. Patient reports that since last follow up visit the physical functioning is worse, family/social relationships are unchanged, mood is unchanged sleep patterns are unchanged. Mr. Mota states that since starting the treatment with the current regimen the  overall functioning  in the above aspects is  better, Patient denies misusing/abusing his narcotic pain medications or using any illegal drugs.  There are No indicators for possible drug abuse, addiction or diversion problems.   Upon obtaining the medical history from Mr. Mota regarding today's office visit for his presenting problems, patient states his hips have been sore, he was walking a lot, he was on vacation. Mr. Mota reports he is working full time. Patient complains of his back and legs along with the hips hurting. Patient denies any side effects with the medications. He mentions he is using Mobic PRN.       ALLERGIES: Patients list of allergies were reviewed     MEDICATIONS: Mr. Mota list of medications were reviewed.His current

## 2024-11-08 ENCOUNTER — TELEPHONE (OUTPATIENT)
Dept: PAIN MANAGEMENT | Age: 59
End: 2024-11-08

## 2024-11-08 NOTE — TELEPHONE ENCOUNTER
Submitted PA for Morphine Sulfate Via [facility] Key: [id number] STATUS: APPROVED 11/8/2024-5/7/2025.    Auth Expiration Date: 5/6/2025.    [facility] has been notified.   Thank you!

## 2024-12-04 ENCOUNTER — HOSPITAL ENCOUNTER (OUTPATIENT)
Age: 59
Discharge: HOME | End: 2024-12-04
Payer: COMMERCIAL

## 2024-12-04 ENCOUNTER — HOSPITAL ENCOUNTER (OUTPATIENT)
Dept: GENERAL RADIOLOGY | Age: 59
Discharge: HOME | End: 2024-12-04
Payer: COMMERCIAL

## 2024-12-04 DIAGNOSIS — M51.27 PROLAPSED LUMBOSACRAL INTERVERTEBRAL DISC: ICD-10-CM

## 2024-12-04 DIAGNOSIS — S33.5XXA SPRAIN OF LOW BACK, INITIAL ENCOUNTER: ICD-10-CM

## 2024-12-04 DIAGNOSIS — S33.5XXA SPRAIN OF LUMBAR REGION, INITIAL ENCOUNTER: ICD-10-CM

## 2024-12-04 DIAGNOSIS — S33.5XXA LUMBAR SPRAIN, INITIAL ENCOUNTER: ICD-10-CM

## 2024-12-04 DIAGNOSIS — S33.5XXA LOW BACK SPRAIN, INITIAL ENCOUNTER: ICD-10-CM

## 2024-12-04 PROCEDURE — 72100 X-RAY EXAM L-S SPINE 2/3 VWS: CPT

## 2024-12-04 PROCEDURE — 72040 X-RAY EXAM NECK SPINE 2-3 VW: CPT

## 2024-12-05 ENCOUNTER — OFFICE VISIT (OUTPATIENT)
Dept: PAIN MANAGEMENT | Age: 59
End: 2024-12-05

## 2024-12-05 VITALS
OXYGEN SATURATION: 98 % | BODY MASS INDEX: 30.71 KG/M2 | DIASTOLIC BLOOD PRESSURE: 65 MMHG | HEART RATE: 69 BPM | SYSTOLIC BLOOD PRESSURE: 119 MMHG | WEIGHT: 214 LBS

## 2024-12-05 DIAGNOSIS — S33.5XXA LUMBAR SPRAIN, INITIAL ENCOUNTER: ICD-10-CM

## 2024-12-05 DIAGNOSIS — S33.5XXA SPRAIN OF LUMBAR REGION, INITIAL ENCOUNTER: ICD-10-CM

## 2024-12-05 DIAGNOSIS — G89.4 CHRONIC PAIN SYNDROME: ICD-10-CM

## 2024-12-05 DIAGNOSIS — M51.27 PROLAPSED LUMBOSACRAL INTERVERTEBRAL DISC: ICD-10-CM

## 2024-12-05 DIAGNOSIS — S33.5XXA LOW BACK SPRAIN, INITIAL ENCOUNTER: ICD-10-CM

## 2024-12-05 DIAGNOSIS — S33.5XXA SPRAIN OF LOW BACK, INITIAL ENCOUNTER: ICD-10-CM

## 2024-12-05 RX ORDER — MELOXICAM 15 MG/1
15 TABLET ORAL DAILY PRN
Qty: 30 TABLET | Refills: 0 | Status: SHIPPED | OUTPATIENT
Start: 2024-12-05

## 2024-12-05 RX ORDER — OXYCODONE AND ACETAMINOPHEN 7.5; 325 MG/1; MG/1
1 TABLET ORAL EVERY 6 HOURS PRN
Qty: 112 TABLET | Refills: 0 | Status: SHIPPED | OUTPATIENT
Start: 2024-12-05 | End: 2025-01-02

## 2024-12-05 NOTE — PROGRESS NOTES
sprain, initial encounter    4. BWC-Sprain of low back, initial encounter    5. BWC-Sprain of lumbar region, initial encounter    6. Chronic pain syndrome        PLAN:  Informed verbal consent was obtained.  Risks and benefits of the medications and other alternative treatments  including no treatment have been discussed with the patient. Any questions related to these were addressed. The common side effects of these medications were also explained to the patient.    -ROM/Stretching exercises as advised   -Cervical spine xray reviewed; shows multi level DDD with spondylosis and spurring   -Lumbar spine xray reviewed; shows DDD L4-5, L5-S1 with spondylosis and spurring   -Will discontinue Ms Contin and Norco  -Start Percocet 7.5 mg 4 per day  -Monitor blood sugar regularly, diabetic control- adv diabetic diet. Goal for fasting blood sugars around 120. Follow up with Endocrinologist/PCP also for on going management    -Chronic pain of multifactorial etiology present for 35 years. Above diagnosis  arrived after complete work up. Management of the chronic pain over the years has included 15-20 injections and block   Multiple interventional and non interventional procedures and has participated in numerous  PT visits and HEP and non pharmacological treatment modalities.  Pharmacological agents have included opioid and non opioid medications which have helped Mr. Mota manage the chronic  disabling pain, improve His quality of life, improve physical and psychosocial functioning  and help relieve suffering.  Current MED 50   Last UDS 07/24 consistent   -Above reviewed today, no changes     Current Outpatient Medications   Medication Sig Dispense Refill    meloxicam (MOBIC) 15 MG tablet Take 1 tablet by mouth daily as needed for Pain 30 tablet 0    naloxone (NARCAN) 4 MG/0.1ML LIQD nasal spray 1 spray by Nasal route as needed for Opioid Reversal 1 each 0    QUEtiapine (SEROQUEL) 25 MG tablet Take 1-2 tablets by mouth

## 2024-12-30 DIAGNOSIS — M51.27 PROLAPSED LUMBOSACRAL INTERVERTEBRAL DISC: ICD-10-CM

## 2024-12-30 DIAGNOSIS — S33.5XXA SPRAIN OF LUMBAR REGION, INITIAL ENCOUNTER: ICD-10-CM

## 2024-12-30 DIAGNOSIS — S33.5XXA SPRAIN OF LOW BACK, INITIAL ENCOUNTER: ICD-10-CM

## 2024-12-30 DIAGNOSIS — S33.5XXA LUMBAR SPRAIN, INITIAL ENCOUNTER: ICD-10-CM

## 2024-12-30 DIAGNOSIS — S33.5XXA LOW BACK SPRAIN, INITIAL ENCOUNTER: ICD-10-CM

## 2024-12-31 RX ORDER — MELOXICAM 15 MG/1
15 TABLET ORAL DAILY PRN
Qty: 30 TABLET | Refills: 0 | OUTPATIENT
Start: 2024-12-31

## 2025-01-02 ENCOUNTER — OFFICE VISIT (OUTPATIENT)
Dept: PAIN MANAGEMENT | Age: 60
End: 2025-01-02

## 2025-01-02 VITALS
OXYGEN SATURATION: 97 % | HEART RATE: 73 BPM | DIASTOLIC BLOOD PRESSURE: 71 MMHG | WEIGHT: 213 LBS | BODY MASS INDEX: 30.56 KG/M2 | SYSTOLIC BLOOD PRESSURE: 124 MMHG

## 2025-01-02 DIAGNOSIS — S33.5XXA LUMBAR SPRAIN, INITIAL ENCOUNTER: ICD-10-CM

## 2025-01-02 DIAGNOSIS — M51.27 PROLAPSED LUMBOSACRAL INTERVERTEBRAL DISC: ICD-10-CM

## 2025-01-02 DIAGNOSIS — S33.5XXA LOW BACK SPRAIN, INITIAL ENCOUNTER: ICD-10-CM

## 2025-01-02 DIAGNOSIS — G89.4 CHRONIC PAIN SYNDROME: ICD-10-CM

## 2025-01-02 DIAGNOSIS — S33.5XXA SPRAIN OF LUMBAR REGION, INITIAL ENCOUNTER: ICD-10-CM

## 2025-01-02 DIAGNOSIS — S33.5XXA SPRAIN OF LOW BACK, INITIAL ENCOUNTER: ICD-10-CM

## 2025-01-02 RX ORDER — PRAMIPEXOLE DIHYDROCHLORIDE 0.25 MG/1
.25-.5 TABLET ORAL NIGHTLY
Qty: 60 TABLET | Refills: 3 | Status: SHIPPED | OUTPATIENT
Start: 2025-01-02

## 2025-01-02 RX ORDER — QUETIAPINE FUMARATE 25 MG/1
25-50 TABLET, FILM COATED ORAL NIGHTLY
Qty: 60 TABLET | Refills: 0 | Status: SHIPPED | OUTPATIENT
Start: 2025-01-02

## 2025-01-02 RX ORDER — OXYCODONE AND ACETAMINOPHEN 7.5; 325 MG/1; MG/1
1 TABLET ORAL EVERY 6 HOURS PRN
Qty: 112 TABLET | Refills: 0 | Status: SHIPPED | OUTPATIENT
Start: 2025-01-02 | End: 2025-01-30

## 2025-01-02 RX ORDER — MELOXICAM 15 MG/1
15 TABLET ORAL DAILY PRN
Qty: 30 TABLET | Refills: 0 | Status: SHIPPED | OUTPATIENT
Start: 2025-01-02

## 2025-01-02 NOTE — PROGRESS NOTES
am aware of the patient receiving these medications. [place] record will be rechecked as part of office protocol.    -he was advised proper sleep hygiene-told to avoid:use of caffeine or other stimulants after noon, alcohol use near bedtime, long or frequent naps during the day, erratic sleep schedule, heavy meals near bedtime, vigorous exercise near bedtime and use of electronic devices near bedtime   -Continue with Seroquel 25 mg 1-2 nightly   -Start Mirapex for restless leg syndrome like symptoms 0.25 mg 1-2 nightly   -CBT techniques for chronic pain to help with:  -Reducing the negative impact of pain on daily life  -Improving physical and emotional functioning  -Increasing effective coping skills for managing pain  -Reducing pain intensity  Employing techniques of  - Exercise, pacing- relaxation therapies such as biofeedback, mindfulness based stress reduction, imagery, cognitive restructuring, behavioral activation and problem solving   -Continue with Mobic along with Percocet 4 per day  -Chronic pain of multifactorial etiology present for 35 years. Above diagnosis  arrived after complete work up. Management of the chronic pain over the years has included 15-20 injections and block   Multiple interventional and non interventional procedures and has participated in numerous  PT visits and HEP and non pharmacological treatment modalities.  Pharmacological agents have included opioid and non opioid medications which have helped Mr. Mota manage the chronic  disabling pain, improve His quality of life, improve physical and psychosocial functioning  and help relieve suffering.  Current MED 50   Last UDS 07/24 consistent   -Above medical history reviewed, Any changes were updated 01/02/25   Medications/orders associated with this visit:  Current Outpatient Medications   Medication Sig Dispense Refill    meloxicam (MOBIC) 15 MG tablet Take 1 tablet by mouth daily as needed for Pain 30 tablet 0

## 2025-01-07 RX ORDER — PRAMIPEXOLE DIHYDROCHLORIDE 0.25 MG/1
TABLET ORAL
Qty: 180 TABLET | OUTPATIENT
Start: 2025-01-07

## 2025-01-30 ENCOUNTER — OFFICE VISIT (OUTPATIENT)
Dept: PAIN MANAGEMENT | Age: 60
End: 2025-01-30

## 2025-01-30 VITALS
DIASTOLIC BLOOD PRESSURE: 66 MMHG | HEART RATE: 64 BPM | SYSTOLIC BLOOD PRESSURE: 162 MMHG | WEIGHT: 216 LBS | BODY MASS INDEX: 30.99 KG/M2 | OXYGEN SATURATION: 98 %

## 2025-01-30 DIAGNOSIS — G89.4 CHRONIC PAIN SYNDROME: ICD-10-CM

## 2025-01-30 DIAGNOSIS — S33.5XXA SPRAIN OF LOW BACK, INITIAL ENCOUNTER: ICD-10-CM

## 2025-01-30 DIAGNOSIS — S33.5XXA SPRAIN OF LUMBAR REGION, INITIAL ENCOUNTER: ICD-10-CM

## 2025-01-30 DIAGNOSIS — S33.5XXA LOW BACK SPRAIN, INITIAL ENCOUNTER: ICD-10-CM

## 2025-01-30 DIAGNOSIS — M51.27 PROLAPSED LUMBOSACRAL INTERVERTEBRAL DISC: ICD-10-CM

## 2025-01-30 DIAGNOSIS — S33.5XXA LUMBAR SPRAIN, INITIAL ENCOUNTER: ICD-10-CM

## 2025-01-30 RX ORDER — OXYCODONE AND ACETAMINOPHEN 7.5; 325 MG/1; MG/1
1 TABLET ORAL EVERY 6 HOURS PRN
Qty: 112 TABLET | Refills: 0 | Status: SHIPPED | OUTPATIENT
Start: 2025-01-30 | End: 2025-02-27

## 2025-01-30 NOTE — PROGRESS NOTES
Juan M Mota  1965  [id number]      HISTORY OF PRESENT ILLNESS:  Mr. Mota is a 59 y.o. male returns for a follow up visit for pain management  He has a diagnosis of   1. BWC-Sprain of lumbar region, initial encounter    2. Chronic pain syndrome    3. BWC-Prolapsed lumbosacral intervertebral disc    4. BWC-Sprain of low back, initial encounter    5. BWC Lumbar sprain, initial encounter    6. BWC-Low back sprain, initial encounter    .      As per information/history obtained from the PADT(patient assessment and documentation tool) -  He complains of pain in the lower back with radiation to the buttocks, hips Left, upper leg Left, and knees Left He rates the pain 7/10 and describes it as sharp, aching, burning.  Pain is made worse by: movement, walking, standing, bending, lifting. He denies any side effects from the current pain regimen. Patient reports that since last follow up visit the physical functioning is worse, family/social relationships are unchanged, mood is unchanged sleep patterns are unchanged. Mr. Mota states that since starting the treatment with the current regimen the  overall functioning  in the above aspects is  better, Patient denies misusing/abusing his narcotic pain medications or using any illegal drugs.  There are No indicators for possible drug abuse, addiction or diversion problems.   Upon obtaining the medical history from Mr. Mota regarding today's office visit for his presenting problems, patient states he has been doing fair, he states he has been sick for last few days, he had to miss work. Mr. Mota mentions he is using Mobic along with Percocet 4 per day. He states he is using Mirapex for restless leg syndrome, it helps some.       ALLERGIES: Patients list of allergies were reviewed     MEDICATIONS: Mr. Mota list of medications were reviewed.His current medications are   Outpatient Medications Prior to Visit   Medication Sig Dispense Refill    meloxicam (MOBIC) 15 MG tablet

## 2025-02-27 ENCOUNTER — OFFICE VISIT (OUTPATIENT)
Dept: PAIN MANAGEMENT | Age: 60
End: 2025-02-27

## 2025-02-27 ENCOUNTER — PATIENT MESSAGE (OUTPATIENT)
Dept: PAIN MANAGEMENT | Age: 60
End: 2025-02-27

## 2025-02-27 VITALS
OXYGEN SATURATION: 98 % | WEIGHT: 221 LBS | HEART RATE: 67 BPM | DIASTOLIC BLOOD PRESSURE: 84 MMHG | BODY MASS INDEX: 31.71 KG/M2 | SYSTOLIC BLOOD PRESSURE: 141 MMHG

## 2025-02-27 DIAGNOSIS — S33.5XXA SPRAIN OF LOW BACK, INITIAL ENCOUNTER: ICD-10-CM

## 2025-02-27 DIAGNOSIS — S33.5XXA LOW BACK SPRAIN, INITIAL ENCOUNTER: ICD-10-CM

## 2025-02-27 DIAGNOSIS — M51.27 PROLAPSED LUMBOSACRAL INTERVERTEBRAL DISC: ICD-10-CM

## 2025-02-27 DIAGNOSIS — G89.4 CHRONIC PAIN SYNDROME: ICD-10-CM

## 2025-02-27 DIAGNOSIS — S33.5XXA SPRAIN OF LUMBAR REGION, INITIAL ENCOUNTER: ICD-10-CM

## 2025-02-27 DIAGNOSIS — S33.5XXA LUMBAR SPRAIN, INITIAL ENCOUNTER: ICD-10-CM

## 2025-02-27 RX ORDER — OXYCODONE AND ACETAMINOPHEN 7.5; 325 MG/1; MG/1
1 TABLET ORAL EVERY 6 HOURS PRN
Qty: 112 TABLET | Refills: 0 | Status: SHIPPED | OUTPATIENT
Start: 2025-02-27 | End: 2025-03-27

## 2025-02-27 NOTE — PROGRESS NOTES
Juan M Mota  1965  [id number]      HISTORY OF PRESENT ILLNESS:  Mr. Mota is a 59 y.o. male returns for a follow up visit for pain management  He has a diagnosis of   1. Chronic pain syndrome    2. BWC-Sprain of lumbar region, initial encounter    3. BWC-Sprain of low back, initial encounter    4. BWC Lumbar sprain, initial encounter    5. BWC-Prolapsed lumbosacral intervertebral disc    6. BWC-Low back sprain, initial encounter    .      As per information/history obtained from the PADT(patient assessment and documentation tool) -  He complains of pain in the lower back with radiation to the buttocks, hips Left, upper leg Left, and knees Left He rates the pain 7/10 and describes it as sharp, aching, pins and needles.  Pain is made worse by: movement, walking, standing, sitting, bending, lifting. He denies any side effects from the current pain regimen. Patient reports that since last follow up visit the physical functioning is worse, family/social relationships are unchanged, mood is unchanged sleep patterns are unchanged. Mr. Mota states that since starting the treatment with the current regimen the  overall functioning  in the above aspects is  better, Patient denies misusing/abusing his narcotic pain medications or using any illegal drugs.  There are No indicators for possible drug abuse, addiction or diversion problems.   Upon obtaining the medical history from Mr. Mota regarding today's office visit for his presenting problems, patient states he has been doing fair. Mr. Mota reports he is working full time. Patient denies any side effects. He mentions he is using Percocet 4 per day along with other adjuvants. Patient says he is using Mobic along with Seroquel and Mirapex as needed only.       ALLERGIES: Patients list of allergies were reviewed     MEDICATIONS: Mr. Mota list of medications were reviewed.His current medications are   Outpatient Medications Prior to Visit   Medication Sig Dispense

## 2025-02-28 ENCOUNTER — TELEPHONE (OUTPATIENT)
Dept: PAIN MANAGEMENT | Age: 60
End: 2025-02-28

## 2025-02-28 NOTE — TELEPHONE ENCOUNTER
Per [facility] document uploaded in media, \"Medication is not covered. The therapeutic class of medication has been DENIED.\"

## 2025-02-28 NOTE — TELEPHONE ENCOUNTER
Pt states he usus his personal insurance for his prescriptions. Could we please submit the PA to [facility] for his medications

## 2025-02-28 NOTE — TELEPHONE ENCOUNTER
Submitted [facility] PA for Oxycodone-Acetaminophen Via [facility] Key: [id number] STATUS: PENDING.    Follow up done daily; if no decision with in three days we will refax.  If another three days goes by with no decision will call the insurance for status.

## 2025-02-28 NOTE — TELEPHONE ENCOUNTER
Submitted PA for Oxycodone-Acetaminophen Via [facility] Key: [id number] STATUS: APPROVED 2/28/2025-8/27/2025.    Authorization Expiration Date: 8/26/2025.    Pharmacy has been notified. Thank you!

## 2025-03-27 ENCOUNTER — OFFICE VISIT (OUTPATIENT)
Dept: PAIN MANAGEMENT | Age: 60
End: 2025-03-27

## 2025-03-27 VITALS
HEART RATE: 71 BPM | SYSTOLIC BLOOD PRESSURE: 112 MMHG | WEIGHT: 216 LBS | BODY MASS INDEX: 30.99 KG/M2 | OXYGEN SATURATION: 98 % | DIASTOLIC BLOOD PRESSURE: 72 MMHG

## 2025-03-27 DIAGNOSIS — S33.5XXA SPRAIN OF LUMBAR REGION, INITIAL ENCOUNTER: ICD-10-CM

## 2025-03-27 DIAGNOSIS — S33.5XXA LUMBAR SPRAIN, INITIAL ENCOUNTER: ICD-10-CM

## 2025-03-27 DIAGNOSIS — S33.5XXA SPRAIN OF LOW BACK, INITIAL ENCOUNTER: ICD-10-CM

## 2025-03-27 DIAGNOSIS — S33.5XXA LOW BACK SPRAIN, INITIAL ENCOUNTER: ICD-10-CM

## 2025-03-27 DIAGNOSIS — M51.27 PROLAPSED LUMBOSACRAL INTERVERTEBRAL DISC: ICD-10-CM

## 2025-03-27 RX ORDER — OXYCODONE AND ACETAMINOPHEN 7.5; 325 MG/1; MG/1
1 TABLET ORAL EVERY 6 HOURS PRN
Qty: 112 TABLET | Refills: 0 | Status: SHIPPED | OUTPATIENT
Start: 2025-03-27 | End: 2025-04-24

## 2025-03-27 RX ORDER — MELOXICAM 15 MG/1
15 TABLET ORAL DAILY PRN
Qty: 30 TABLET | Refills: 0 | Status: SHIPPED | OUTPATIENT
Start: 2025-03-27

## 2025-03-27 RX ORDER — QUETIAPINE FUMARATE 25 MG/1
25-50 TABLET, FILM COATED ORAL NIGHTLY
Qty: 60 TABLET | Refills: 1 | Status: SHIPPED | OUTPATIENT
Start: 2025-03-27

## 2025-03-27 NOTE — PROGRESS NOTES
Juan M Mota  1965  [id number]    HISTORY OF PRESENT ILLNESS:  Mr. Mota is a 59 y.o. male returns for a follow up visit for multiple medical problems.  His  presenting problems are   1. BWC Lumbar sprain, initial encounter    2. BWC-Sprain of lumbar region, initial encounter    3. BWC-Prolapsed lumbosacral intervertebral disc    4. BWC-Low back sprain, initial encounter    5. BWC-Sprain of low back, initial encounter    .    As per information/history obtained from the PADT(patient assessment and documentation tool) -  He complains of pain in the lower back with radiation to the buttocks, hips Left, upper leg Left, and knees Left He rates the pain 7/10 and describes it as aching, numbness.  Pain is made worse by: movement, walking, standing, sitting, bending, lifting.  Current treatment regimen has helped relieve about 60% of the pain.  He denies side effects from the current pain regimen.   Patient reports that since last follow up visit the physical functioning is unchanged, family/social relationships are unchanged, mood is unchanged sleep patterns are unchanged.  Mr. Mota states that since starting the treatment with the current regimen the  overall functioning  in the above aspects is  better,Patient denies neurological bowel or bladder. Patient denies misusing/abusing his narcotic pain medications or using any illegal drugs.  There are No indicators for possible drug abuse, addiction or diversion problems.     Upon obtaining the medical history from Mr. Mota regarding today's office visit for his presenting problems, patient states he has been doing fair. Mr. Mota reports he is working full time. He states he has been compliant with his regimen. Patient states he is having problems with getting his opioid prescription filed. Patient states his sleep is fair. Has fairly normal sleep latency. Averages about 4-6 hours of sleep a night. Denies any signs of sleep apnea. Feels somewhat rested in the

## 2025-04-24 ENCOUNTER — OFFICE VISIT (OUTPATIENT)
Dept: PAIN MANAGEMENT | Age: 60
End: 2025-04-24

## 2025-04-24 VITALS — HEART RATE: 64 BPM | BODY MASS INDEX: 31.28 KG/M2 | WEIGHT: 218 LBS | OXYGEN SATURATION: 97 %

## 2025-04-24 DIAGNOSIS — S33.5XXA SPRAIN OF LUMBAR REGION, INITIAL ENCOUNTER: ICD-10-CM

## 2025-04-24 DIAGNOSIS — S33.5XXA LUMBAR SPRAIN, INITIAL ENCOUNTER: ICD-10-CM

## 2025-04-24 DIAGNOSIS — S33.5XXA LOW BACK SPRAIN, INITIAL ENCOUNTER: ICD-10-CM

## 2025-04-24 DIAGNOSIS — S33.5XXA SPRAIN OF LOW BACK, INITIAL ENCOUNTER: ICD-10-CM

## 2025-04-24 DIAGNOSIS — M51.27 PROLAPSED LUMBOSACRAL INTERVERTEBRAL DISC: ICD-10-CM

## 2025-04-24 DIAGNOSIS — G89.4 CHRONIC PAIN SYNDROME: ICD-10-CM

## 2025-04-24 RX ORDER — OXYCODONE AND ACETAMINOPHEN 7.5; 325 MG/1; MG/1
1 TABLET ORAL EVERY 6 HOURS PRN
Qty: 112 TABLET | Refills: 0 | Status: SHIPPED | OUTPATIENT
Start: 2025-04-24 | End: 2025-05-22

## 2025-04-24 NOTE — PROGRESS NOTES
Juan M Mota  1965  [id number]      HISTORY OF PRESENT ILLNESS:  Mr. Mota is a 60 y.o. male returns for a follow up visit for pain management  He has a diagnosis of   1. BWC Lumbar sprain, initial encounter    2. Chronic pain syndrome    3. BWC-Sprain of lumbar region, initial encounter    4. BWC-Prolapsed lumbosacral intervertebral disc    5. BWC-Low back sprain, initial encounter    6. BWC-Sprain of low back, initial encounter    .      As per information/history obtained from the PADT(patient assessment and documentation tool) -  He complains of pain in the lower back with radiation to the buttocks, hips Left, and upper leg Left He rates the pain 6/10 and describes it as sharp, aching, burning.  Pain is made worse by: movement, walking, standing, sitting, bending, lifting. He denies any side effects from the current pain regimen. Patient reports that since last follow up visit the physical functioning is worse, family/social relationships are unchanged, mood is unchanged sleep patterns are worse. Mr. Mota states that since starting the treatment with the current regimen the  overall functioning  in the above aspects is  better, Patient denies misusing/abusing his narcotic pain medications or using any illegal drugs.  There are No indicators for possible drug abuse, addiction or diversion problems.   Upon obtaining the medical history from Mr. Mota regarding today's office visit for his presenting problems, patient states he has been doing fair, pain has been baseline and tolerable somewhat with the medication. He denies any side effects. He mentions he's not using much Mirapex. He denies any constipation symptoms.       ALLERGIES: Patients list of allergies were reviewed     MEDICATIONS: Mr. Mota list of medications were reviewed.His current medications are   Outpatient Medications Prior to Visit   Medication Sig Dispense Refill    meloxicam (MOBIC) 15 MG tablet Take 1 tablet by mouth daily as needed

## 2025-05-07 DIAGNOSIS — M51.27 PROLAPSED LUMBOSACRAL INTERVERTEBRAL DISC: ICD-10-CM

## 2025-05-07 DIAGNOSIS — S33.5XXA LUMBAR SPRAIN, INITIAL ENCOUNTER: ICD-10-CM

## 2025-05-07 DIAGNOSIS — S33.5XXA LOW BACK SPRAIN, INITIAL ENCOUNTER: ICD-10-CM

## 2025-05-07 DIAGNOSIS — S33.5XXA SPRAIN OF LUMBAR REGION, INITIAL ENCOUNTER: ICD-10-CM

## 2025-05-07 DIAGNOSIS — S33.5XXA SPRAIN OF LOW BACK, INITIAL ENCOUNTER: ICD-10-CM

## 2025-05-07 RX ORDER — MELOXICAM 15 MG/1
15 TABLET ORAL DAILY PRN
Qty: 30 TABLET | Refills: 0 | OUTPATIENT
Start: 2025-05-07

## 2025-05-22 ENCOUNTER — OFFICE VISIT (OUTPATIENT)
Dept: PAIN MANAGEMENT | Age: 60
End: 2025-05-22
Payer: COMMERCIAL

## 2025-05-22 VITALS
WEIGHT: 219 LBS | HEART RATE: 66 BPM | OXYGEN SATURATION: 98 % | SYSTOLIC BLOOD PRESSURE: 135 MMHG | DIASTOLIC BLOOD PRESSURE: 72 MMHG | BODY MASS INDEX: 31.42 KG/M2

## 2025-05-22 DIAGNOSIS — M51.27 PROLAPSED LUMBOSACRAL INTERVERTEBRAL DISC: ICD-10-CM

## 2025-05-22 DIAGNOSIS — S33.5XXA SPRAIN OF LOW BACK, INITIAL ENCOUNTER: ICD-10-CM

## 2025-05-22 DIAGNOSIS — S33.5XXA LUMBAR SPRAIN, INITIAL ENCOUNTER: ICD-10-CM

## 2025-05-22 DIAGNOSIS — S33.5XXA SPRAIN OF LUMBAR REGION, INITIAL ENCOUNTER: ICD-10-CM

## 2025-05-22 DIAGNOSIS — S33.5XXA LOW BACK SPRAIN, INITIAL ENCOUNTER: ICD-10-CM

## 2025-05-22 PROCEDURE — 3078F DIAST BP <80 MM HG: CPT | Performed by: INTERNAL MEDICINE

## 2025-05-22 PROCEDURE — 3075F SYST BP GE 130 - 139MM HG: CPT | Performed by: INTERNAL MEDICINE

## 2025-05-22 PROCEDURE — 99213 OFFICE O/P EST LOW 20 MIN: CPT | Performed by: INTERNAL MEDICINE

## 2025-05-22 RX ORDER — QUETIAPINE FUMARATE 25 MG/1
25-50 TABLET, FILM COATED ORAL NIGHTLY
Qty: 60 TABLET | Refills: 1 | Status: SHIPPED | OUTPATIENT
Start: 2025-05-22

## 2025-05-22 RX ORDER — OXYCODONE AND ACETAMINOPHEN 7.5; 325 MG/1; MG/1
1 TABLET ORAL EVERY 6 HOURS PRN
Qty: 112 TABLET | Refills: 0 | Status: SHIPPED | OUTPATIENT
Start: 2025-05-22 | End: 2025-06-19

## 2025-05-22 RX ORDER — PRAMIPEXOLE DIHYDROCHLORIDE 0.25 MG/1
.25-.5 TABLET ORAL NIGHTLY
Qty: 60 TABLET | Refills: 1 | Status: SHIPPED | OUTPATIENT
Start: 2025-05-22

## 2025-05-22 RX ORDER — MELOXICAM 15 MG/1
15 TABLET ORAL DAILY PRN
Qty: 30 TABLET | Refills: 0 | Status: SHIPPED | OUTPATIENT
Start: 2025-05-22

## 2025-05-22 NOTE — PROGRESS NOTES
Juan M Mota  1965  [id number]    HISTORY OF PRESENT ILLNESS:  Mr. Mota is a 60 y.o. male returns for a follow up visit for multiple medical problems.  His  presenting problems are   1. BWC Lumbar sprain, initial encounter    2. BWC-Sprain of lumbar region, initial encounter    3. BWC-Prolapsed lumbosacral intervertebral disc    4. BWC-Sprain of low back, initial encounter    5. BWC-Low back sprain, initial encounter    .    As per information/history obtained from the PADT(patient assessment and documentation tool) -  He complains of pain in the lower back with radiation to the buttocks, hips Left, upper leg Left, and knees Left He rates the pain 7/10 and describes it as sharp, aching, burning.  Pain is made worse by: movement, walking, standing, bending.  Current treatment regimen has helped relieve about 80% of the pain.  He denies side effects from the current pain regimen.   Patient reports that since last follow up visit the physical functioning is worse, family/social relationships are unchanged, mood is unchanged sleep patterns are unchanged.  Mr. Mota states that since starting the treatment with the current regimen the  overall functioning  in the above aspects is  better,Patient denies neurological bowel or bladder. Patient denies misusing/abusing his narcotic pain medications or using any illegal drugs.  There are No indicators for possible drug abuse, addiction or diversion problems.     Upon obtaining the medical history from Mr. Mota regarding today's office visit for his presenting problems, patient states he has been doing fair. Mr. Mota states his back locked up last week, he states he was having pain in the left side of back, left leg. He mentions he is using Percocet along with Mobic . Patient denies any constipation symptoms, he is using Miralax. Patient states his sleep is fair. Has fairly normal sleep latency. Averages about 4-6 hours of sleep a night. Denies any signs of sleep apnea.

## 2025-05-23 RX ORDER — PRAMIPEXOLE DIHYDROCHLORIDE 0.25 MG/1
TABLET ORAL
Qty: 180 TABLET | OUTPATIENT
Start: 2025-05-23

## 2025-06-19 ENCOUNTER — OFFICE VISIT (OUTPATIENT)
Dept: PAIN MANAGEMENT | Age: 60
End: 2025-06-19

## 2025-06-19 VITALS
SYSTOLIC BLOOD PRESSURE: 149 MMHG | HEART RATE: 78 BPM | WEIGHT: 215 LBS | OXYGEN SATURATION: 97 % | BODY MASS INDEX: 30.85 KG/M2 | DIASTOLIC BLOOD PRESSURE: 86 MMHG

## 2025-06-19 DIAGNOSIS — S33.5XXA SPRAIN OF LOW BACK, INITIAL ENCOUNTER: ICD-10-CM

## 2025-06-19 DIAGNOSIS — S33.5XXA LUMBAR SPRAIN, INITIAL ENCOUNTER: ICD-10-CM

## 2025-06-19 DIAGNOSIS — M51.27 PROLAPSED LUMBOSACRAL INTERVERTEBRAL DISC: ICD-10-CM

## 2025-06-19 DIAGNOSIS — S33.5XXA SPRAIN OF LUMBAR REGION, INITIAL ENCOUNTER: ICD-10-CM

## 2025-06-19 DIAGNOSIS — S33.5XXA LOW BACK SPRAIN, INITIAL ENCOUNTER: ICD-10-CM

## 2025-06-19 RX ORDER — OXYCODONE AND ACETAMINOPHEN 7.5; 325 MG/1; MG/1
1 TABLET ORAL EVERY 6 HOURS PRN
Qty: 112 TABLET | Refills: 0 | Status: SHIPPED | OUTPATIENT
Start: 2025-06-19 | End: 2025-07-17

## 2025-06-19 NOTE — PROGRESS NOTES
Juan M Mota  1965  [id number]      HISTORY OF PRESENT ILLNESS:  Mr. Mota is a 60 y.o. male returns for a follow up visit for pain management  He has a diagnosis of   1. BWC Lumbar sprain, initial encounter    2. BWC-Sprain of lumbar region, initial encounter    3. BWC-Prolapsed lumbosacral intervertebral disc    4. BWC-Sprain of low back, initial encounter    5. BWC-Low back sprain, initial encounter    .      As per information/history obtained from the PADT(patient assessment and documentation tool) -  He complains of pain in the lower back with radiation to the buttocks, hips Left, upper leg Left, and knees Left He rates the pain 5/10 and describes it as sharp, aching.  Pain is made worse by: movement, bending, lifting. He denies any side effects from the current pain regimen. Patient reports that since last follow up visit the physical functioning is unchanged, family/social relationships are unchanged, mood is unchanged sleep patterns are unchanged. Mr. Mota states that since starting the treatment with the current regimen the  overall functioning  in the above aspects is  better, Patient denies misusing/abusing his narcotic pain medications or using any illegal drugs.  There are No indicators for possible drug abuse, addiction or diversion problems.   Upon obtaining the medical history from Mr. Mota regarding today's office visit for his presenting problems, patient states he has been doing fair, he is managing with the medications. Mr. Mota mentions he is using Percocet 4 per day along with Mobic and other adjuvants. Patient states he is getting treatment for bladder cancer.       ALLERGIES: Patients list of allergies were reviewed     MEDICATIONS: Mr. Mota list of medications were reviewed.His current medications are   Outpatient Medications Prior to Visit   Medication Sig Dispense Refill    meloxicam (MOBIC) 15 MG tablet Take 1 tablet by mouth daily as needed for Pain 30 tablet 0    pramipexole

## 2025-07-11 NOTE — PROGRESS NOTES
Pham Albright  1965  [id number]    HISTORY OF PRESENT ILLNESS:  Mr. Brandt Andrew is a 60 y.o. male returns for a follow up visit for multiple medical problems. His  presenting problems are   1. BWC Lumbar sprain, initial encounter    2. bwc-Prolapsed lumbosacral intervertebral disc    3. BWC Sprain of lumbar region, initial encounter    4. BWC Low back sprain, initial encounter    5. BWC Sprain of low back, initial encounter    . As per information/history obtained from the PADT(patient assessment and documentation tool) -  He complains of pain in the lower back with radiation to the buttocks, hips Left, upper leg Left and knees Left He rates the pain 7/10 and describes it as aching, burning, numbness. Pain is made worse by: movement, standing, bending, lifting. Current treatment regimen has helped relieve about 60% of the pain. He denies side effects from the current pain regimen. Patient reports that since the last follow up visit the physical functioning is worse, family/social relationships are unchanged, mood is unchanged and sleep patterns are worse, and that the overall functioning is worse. Patient denies neurological bowel or bladder. Patient denies misusing/abusing his narcotic pain medications or using any illegal drugs. There are No indicators for possible drug abuse, addiction or diversion problems. Upon obtaining the medical history from Mr. Brandt Andrew regarding today's office visit for his presenting problems, patient states he has been having soreness in the mid back and goes across the flank. He mentions he went to see his PCP and had labs done. He reports he is going for a CT of \"torso\". He says its \" aggravating me\". He states the pain medications has been helping a little. He reports he has been having pain in the ribcage. He says his breathing has been okay. Patient states his sleep is fair. Has fairly normal sleep latency. Averages about 4-6 hours of sleep a night.  Denies any signs of sleep apnea. Feels somewhat rested in the morning. Patient's  subjective report of his mood is fair. he describes occasional symptoms of depression, occasional  irritability and some mood swings. Describes his mood as being neutral and reports some pleasure in his daily activities. Reports  fair  appetite, energy and concentration. Able to function well in different aspects of his daily activities. Denies suicidal or homicidal ideation. Denies any complaints of increased tension, does   Worry sometimes and occasional  irritability  he denies any c/o increased anxiety, No c/o panic attacks or symptoms of PTSD. He reports he is working full time       ALLERGIES/PAST MED/FAM/SOC HISTORY: Mr. Annabella Vega allergies, past medical, family and social history were reviewed in the chart. Mr. Annabella Vega current medications are   Outpatient Medications Prior to Visit   Medication Sig Dispense Refill    morphine (LAURA) 30 MG extended release capsule Take 1 capsule by mouth daily for 30 days. 30 capsule 0    HYDROcodone-acetaminophen (NORCO) 7.5-325 MG per tablet Take 1 tablet by mouth every 6 hours as needed for Pain (max 3 per day) for up to 30 days. 90 tablet 0    QUEtiapine (SEROQUEL) 25 MG tablet Take 1-2 tablets by mouth nightly 60 tablet 1    meloxicam (MOBIC) 15 MG tablet Take 1 tablet by mouth daily as needed for Pain 30 tablet 1    DULoxetine (CYMBALTA) 30 MG extended release capsule Take 1 capsule by mouth daily 30 capsule 1    magnesium oxide (MAG-OX) 400 MG tablet Take one tablet Po BID 60 tablet 1    NOVOLOG 100 UNIT/ML injection continuous. Insulin pump averages 50-80 units daily      aspirin 325 MG tablet Take 325 mg by mouth daily.  carvedilol (COREG) 6.25 MG tablet Take 6.25 mg by mouth 2 times daily (with meals).  atorvastatin (LIPITOR) 80 MG tablet Take 80 mg by mouth nightly.  clopidogrel (PLAVIX) 75 MG tablet Take 75 mg by mouth daily.       lisinopril (PRINIVIL;ZESTRIL) 10 MG tablet Take 10 mg by mouth daily. No facility-administered medications prior to visit. REVIEW OF SYSTEMS: .   Respiratory: Negative for shortness of breath. Cardiovascular: Negative for chest pain, palpitations  Gastrointestinal: Negative for blood in stool, abdominal distention, nausea, vomiting, abdominal pain, diarrhea,constipation. Neurological: Negative for speech difficulty, weakness and light-headedness, dizziness, tremors, sleepiness  Psychiatric/Behavioral: Negative for suicidal ideas, hallucinations, behavioral problems, self-injury, decreased concentration/cognition, agitation, confusion. PHYSICAL EXAM:   Nursing note and vitals reviewed. /76   Pulse 78   Temp 98.4 °F (36.9 °C) (Temporal)   Wt 198 lb 6.4 oz (90 kg)   SpO2 98%   BMI 28.47 kg/m²   General Appearance: Patient is well nourished, well developed, well groomed and in no acute distress. Skin: Skin is warm and dry, good turgor . No rash or lesions noted. He is not diaphoretic. Pulmonary/Chest: Effort normal. No respiratory distress or use of accessory muscles. Auscultation revealing normal air entry. He does not have wheezes in the lung fields. He has no rales. Cardiovascular: Normal rate, regular rhythm, normal heart sounds, and does not have murmur. Exam reveals no gallop and no friction rub. Musculoskeletal / Extremities: Range of motion is normal. Gait is normal, assistive devices use: none. He exhibits edema: none, and no tenderness. Neurological/Psychiatric:He is alert and oriented to person, place, and time. Coordination is  normal.   Judgement and Insight is normal  His mood is Appropriate and affect is Neutral/Euthymic(normal) . His behavior is normal.   thought content normal.        IMPRESSION:     1. BWC Lumbar sprain, initial encounter    2. bwc-Prolapsed lumbosacral intervertebral disc    3. BWC Sprain of lumbar region, initial encounter    4. BWC Low back sprain, initial encounter    5.  BWC Sprain of low back, initial encounter    6. Chronic pain syndrome    7. Lumbar sprain, initial encounter    8. Sprain of low back, initial encounter        PLAN:  Informed verbal consent was obtained.  -Interim history reviewed   -Labs/Imgaing studies   -PCP notes reviewed   -Advised to get Xray  T spine 2 view  -Continue with Laura with [facility] for btp   -He was advised to increase fluids ( 5-7  glasses of fluid daily), limit caffeine, avoid cheese products, increase dietary fiber, increase activity and exercise as tolerated and relax regularly and enjoy meals    -he was advised proper sleep hygiene-told to avoid:use of caffeine or other stimulants after noon, alcohol use near bedtime, long or frequent naps during the day, erratic sleep schedule, heavy meals near bedtime, vigorous exercise near bedtime and use of electronic devices near bedtime   -Continue with Seroquel   -Continue with Cymbalta 60 mg   -Discussed use, benefit, and side effects of prescribed medications. Barriers to medication compliance addressed. All patient questions answered. Pt voiced understanding.    -Continue with Mobic 15 mg daily   -Postural exercises given   Mr. Little Glynn will be prescribed  the medications  listed below which are for treatment of his presenting  medical problems which for this visit include:   Diagnoses of BWC Lumbar sprain, initial encounter, bwc-Prolapsed lumbosacral intervertebral disc, BWC Sprain of lumbar region, initial encounter, BWC Low back sprain, initial encounter, and BWC Sprain of low back, initial encounter were pertinent to this visit. Medications/orders associated with this visit:    Current Outpatient Medications   Medication Sig Dispense Refill    morphine (LAURA) 30 MG extended release capsule Take 1 capsule by mouth daily for 30 days. 30 capsule 0    HYDROcodone-acetaminophen (NORCO) 7.5-325 MG per tablet Take 1 tablet by mouth every 6 hours as needed for Pain (max 3 per day) for up to 30 days.  90 tablet 0    Reduction of reliance on opioid analgesia/more appropriate opioid use. - he is showing progression towards this treatment goal with the current regimen. Risks and benefits of the medications and other alternative treatments have been/were  discussed with the patient. Any questions on the  common side effects of these medications were also answered. He was advised against drinking alcohol with the narcotic pain medicines, advised against driving or handling machinery when  starting or adjusting the dose of medicines, feeling groggy or drowsy, or if having any cognitive issues related to the current medications. Heis fully aware of the risk of overdose and death, if medicines are misused and not taken as prescribed. If he develops new symptoms or if the symptoms worsen, he was told to call the office. .  Thank you for allowing me to participate in the care of this patient.     Suellen Donahue MD    Cc: Vishal Batista MD [As Noted in HPI] : as noted in HPI [Negative] : Constitutional

## 2025-07-17 ENCOUNTER — OFFICE VISIT (OUTPATIENT)
Dept: PAIN MANAGEMENT | Age: 60
End: 2025-07-17

## 2025-07-17 VITALS
HEART RATE: 73 BPM | SYSTOLIC BLOOD PRESSURE: 118 MMHG | DIASTOLIC BLOOD PRESSURE: 70 MMHG | WEIGHT: 215 LBS | OXYGEN SATURATION: 97 % | BODY MASS INDEX: 30.85 KG/M2

## 2025-07-17 DIAGNOSIS — S33.5XXA SPRAIN OF LUMBAR REGION, INITIAL ENCOUNTER: ICD-10-CM

## 2025-07-17 DIAGNOSIS — S33.5XXA SPRAIN OF LOW BACK, INITIAL ENCOUNTER: ICD-10-CM

## 2025-07-17 DIAGNOSIS — S33.5XXA LUMBAR SPRAIN, INITIAL ENCOUNTER: ICD-10-CM

## 2025-07-17 DIAGNOSIS — M51.27 PROLAPSED LUMBOSACRAL INTERVERTEBRAL DISC: ICD-10-CM

## 2025-07-17 DIAGNOSIS — S33.5XXA LOW BACK SPRAIN, INITIAL ENCOUNTER: ICD-10-CM

## 2025-07-17 RX ORDER — OXYCODONE AND ACETAMINOPHEN 7.5; 325 MG/1; MG/1
1 TABLET ORAL EVERY 6 HOURS PRN
Qty: 120 TABLET | Refills: 0 | Status: SHIPPED | OUTPATIENT
Start: 2025-07-17 | End: 2025-08-16

## 2025-07-17 NOTE — PROGRESS NOTES
Juan M Mota  1965  [id number]    HISTORY OF PRESENT ILLNESS:  Mr. Mota is a 60 y.o. male returns for a follow up visit for multiple medical problems.  His  presenting problems are   1. BWC-Prolapsed lumbosacral intervertebral disc    2. BWC-Sprain of lumbar region, initial encounter    3. BWC-Sprain of low back, initial encounter    4. BWC Lumbar sprain, initial encounter    5. BWC-Low back sprain, initial encounter    .    As per information/history obtained from the PADT(patient assessment and documentation tool) -  He complains of pain in the lower back with radiation to the buttocks, hips Left, upper leg Left, and knees Left He rates the pain 6/10 and describes it as sharp, aching, burning.  Pain is made worse by: movement, walking, standing, bending, lifting.  Current treatment regimen has helped relieve about 60% of the pain.  He denies side effects from the current pain regimen.   Patient reports that since last follow up visit the physical functioning is unchanged, family/social relationships are unchanged, mood is unchanged sleep patterns are unchanged.  Mr. Mota states that since starting the treatment with the current regimen the  overall functioning  in the above aspects is  better,Patient denies neurological bowel or bladder. Patient denies misusing/abusing his narcotic pain medications or using any illegal drugs.  There are No indicators for possible drug abuse, addiction or diversion problems.     Upon obtaining the medical history from Mr. Mota regarding today's office visit for his presenting problems, patient states he has been doing fair, he is managing with the medications. Mr. Mota mentions he is using Percocet 4 per day along with the other adjuvants. He reports he is working full time, he states he is having a lot of work stressors, he states he is busy at work. Patient denies any constipation symptoms. Patient states his sleep is fair. Has fairly normal sleep latency. Averages about

## 2025-08-21 ENCOUNTER — OFFICE VISIT (OUTPATIENT)
Dept: PAIN MANAGEMENT | Age: 60
End: 2025-08-21

## 2025-08-21 VITALS
BODY MASS INDEX: 30.56 KG/M2 | SYSTOLIC BLOOD PRESSURE: 151 MMHG | HEART RATE: 70 BPM | WEIGHT: 213 LBS | DIASTOLIC BLOOD PRESSURE: 80 MMHG | OXYGEN SATURATION: 98 %

## 2025-08-21 DIAGNOSIS — S33.5XXA LUMBAR SPRAIN, INITIAL ENCOUNTER: ICD-10-CM

## 2025-08-21 DIAGNOSIS — M51.27 PROLAPSED LUMBOSACRAL INTERVERTEBRAL DISC: ICD-10-CM

## 2025-08-21 DIAGNOSIS — S33.5XXA SPRAIN OF LUMBAR REGION, INITIAL ENCOUNTER: ICD-10-CM

## 2025-08-21 DIAGNOSIS — S33.5XXA SPRAIN OF LOW BACK, INITIAL ENCOUNTER: ICD-10-CM

## 2025-08-21 DIAGNOSIS — S33.5XXA LOW BACK SPRAIN, INITIAL ENCOUNTER: ICD-10-CM

## 2025-08-21 RX ORDER — QUETIAPINE FUMARATE 25 MG/1
25-50 TABLET, FILM COATED ORAL NIGHTLY
Qty: 60 TABLET | Refills: 1 | Status: SHIPPED | OUTPATIENT
Start: 2025-08-21

## 2025-08-21 RX ORDER — OXYCODONE AND ACETAMINOPHEN 7.5; 325 MG/1; MG/1
1 TABLET ORAL EVERY 6 HOURS PRN
Qty: 112 TABLET | Refills: 0 | Status: SHIPPED | OUTPATIENT
Start: 2025-08-21 | End: 2025-09-18

## 2025-08-21 RX ORDER — MELOXICAM 15 MG/1
15 TABLET ORAL DAILY PRN
Qty: 30 TABLET | Refills: 0 | Status: SHIPPED | OUTPATIENT
Start: 2025-08-21

## 2025-08-21 RX ORDER — PRAMIPEXOLE DIHYDROCHLORIDE 0.25 MG/1
.25-.5 TABLET ORAL NIGHTLY
Qty: 60 TABLET | Refills: 1 | Status: SHIPPED | OUTPATIENT
Start: 2025-08-21